# Patient Record
Sex: FEMALE | Race: WHITE | NOT HISPANIC OR LATINO | Employment: FULL TIME | URBAN - METROPOLITAN AREA
[De-identification: names, ages, dates, MRNs, and addresses within clinical notes are randomized per-mention and may not be internally consistent; named-entity substitution may affect disease eponyms.]

---

## 2017-01-08 DIAGNOSIS — F41.9 ANXIETY: ICD-10-CM

## 2017-01-08 RX ORDER — ALPRAZOLAM 1 MG/1
TABLET ORAL
Qty: 30 TABLET | Refills: 3 | Status: SHIPPED | OUTPATIENT
Start: 2017-01-08 | End: 2020-09-16

## 2017-01-31 ENCOUNTER — PATIENT MESSAGE (OUTPATIENT)
Dept: GYNECOLOGIC ONCOLOGY | Facility: CLINIC | Age: 32
End: 2017-01-31

## 2017-02-01 ENCOUNTER — TELEPHONE (OUTPATIENT)
Dept: GYNECOLOGIC ONCOLOGY | Facility: CLINIC | Age: 32
End: 2017-02-01

## 2017-02-01 NOTE — TELEPHONE ENCOUNTER
Spoke with pt. She states she is experiencing constant pain in right outer thigh, numbness, warm to touch and sharp pain in knees. Pt states she took gabapentin and ibuprofen but nothing is working. Pt was advised I will speak with Dr. Toscano. She voiced understanding.    Per Dr. Toscano, pt is aware he do not think pain is related to cancer, but if she is in that much pain she needs to go to the ED further evaluation possibly a ultrasound of her legs. She voiced understanding

## 2018-08-22 ENCOUNTER — TELEPHONE (OUTPATIENT)
Dept: GASTROENTEROLOGY | Facility: CLINIC | Age: 33
End: 2018-08-22

## 2018-08-22 DIAGNOSIS — K74.69 OTHER CIRRHOSIS OF LIVER: Primary | ICD-10-CM

## 2018-08-22 NOTE — TELEPHONE ENCOUNTER
----- Message from Beatris Lozoya sent at 8/22/2018 10:21 AM CDT -----  Contact: Pt  Name of Who is Calling: Marguerite      What is the request in detail: patient is callig requesting to speak with a nurse in regards to finding out if she has to come in for a visit or not.      Can the clinic reply by [facility]: no      What Number to Call Back if not in [facility]: [phone]

## 2019-10-16 ENCOUNTER — OFFICE VISIT (OUTPATIENT)
Dept: FAMILY MEDICINE | Facility: CLINIC | Age: 34
End: 2019-10-16
Payer: COMMERCIAL

## 2019-10-16 VITALS
OXYGEN SATURATION: 96 % | TEMPERATURE: 98 F | HEIGHT: 67 IN | HEART RATE: 81 BPM | SYSTOLIC BLOOD PRESSURE: 120 MMHG | BODY MASS INDEX: 45.99 KG/M2 | WEIGHT: 293 LBS | DIASTOLIC BLOOD PRESSURE: 80 MMHG

## 2019-10-16 DIAGNOSIS — M54.41 CHRONIC RIGHT-SIDED LOW BACK PAIN WITH RIGHT-SIDED SCIATICA: Primary | ICD-10-CM

## 2019-10-16 DIAGNOSIS — G89.29 CHRONIC RIGHT-SIDED LOW BACK PAIN WITH RIGHT-SIDED SCIATICA: Primary | ICD-10-CM

## 2019-10-16 DIAGNOSIS — R20.2 PARESTHESIA OF RIGHT LEG: ICD-10-CM

## 2019-10-16 PROCEDURE — 99203 OFFICE O/P NEW LOW 30 MIN: CPT | Mod: 25,S$GLB,, | Performed by: NURSE PRACTITIONER

## 2019-10-16 PROCEDURE — 96372 THER/PROPH/DIAG INJ SC/IM: CPT | Mod: S$GLB,,, | Performed by: NURSE PRACTITIONER

## 2019-10-16 PROCEDURE — 3008F PR BODY MASS INDEX (BMI) DOCUMENTED: ICD-10-PCS | Mod: S$GLB,,, | Performed by: NURSE PRACTITIONER

## 2019-10-16 PROCEDURE — 96372 PR INJECTION,THERAP/PROPH/DIAG2ST, IM OR SUBCUT: ICD-10-PCS | Mod: S$GLB,,, | Performed by: NURSE PRACTITIONER

## 2019-10-16 PROCEDURE — 3008F BODY MASS INDEX DOCD: CPT | Mod: S$GLB,,, | Performed by: NURSE PRACTITIONER

## 2019-10-16 PROCEDURE — 99203 PR OFFICE/OUTPT VISIT, NEW, LEVL III, 30-44 MIN: ICD-10-PCS | Mod: 25,S$GLB,, | Performed by: NURSE PRACTITIONER

## 2019-10-16 RX ORDER — ZINC GLUCONATE 50 MG
TABLET ORAL
COMMUNITY
End: 2020-02-17

## 2019-10-16 RX ORDER — TRIAMCINOLONE ACETONIDE 1 MG/G
CREAM TOPICAL 2 TIMES DAILY
COMMUNITY
End: 2021-10-13

## 2019-10-16 RX ORDER — SEMAGLUTIDE 1.34 MG/ML
INJECTION, SOLUTION SUBCUTANEOUS
COMMUNITY
Start: 2019-10-03 | End: 2022-11-09

## 2019-10-16 RX ORDER — METHOCARBAMOL 750 MG/1
750 TABLET, FILM COATED ORAL 4 TIMES DAILY PRN
Qty: 28 TABLET | Refills: 0 | Status: SHIPPED | OUTPATIENT
Start: 2019-10-16 | End: 2019-10-23

## 2019-10-16 RX ORDER — KETOROLAC TROMETHAMINE 30 MG/ML
30 INJECTION, SOLUTION INTRAMUSCULAR; INTRAVENOUS ONCE
Status: COMPLETED | OUTPATIENT
Start: 2019-10-16 | End: 2019-10-16

## 2019-10-16 RX ADMIN — KETOROLAC TROMETHAMINE 30 MG: 30 INJECTION, SOLUTION INTRAMUSCULAR; INTRAVENOUS at 03:10

## 2019-10-16 NOTE — PATIENT INSTRUCTIONS
Back Pain (Acute or Chronic)    Back pain is one of the most common problems. The good news is that most people feel better in 1 to 2 weeks, and most of the rest in 1 to 2 months. Most people can remain active.  People experience and describe pain differently; not everyone is the same.  · The pain can be sharp, stabbing, shooting, aching, cramping or burning.  · Movement, standing, bending, lifting, sitting, or walking may worsen pain.  · It can be localized to one spot or area, or it can be more generalized.  · It can spread or radiate upwards, to the front, or go down your arms or legs (sciatica).  · It can cause muscle spasm.  Most of the time, mechanical problems with the muscles or spine cause the pain. Mechanical problems are usually caused by an injury to the muscles or ligaments. While illness can cause back pain, it is usually not caused by a serious illness. Mechanical problems include:   · Physical activity such as sports, exercise, work, or normal activity  · Overexertion, lifting, pushing, pulling incorrectly or too aggressively  · Sudden twisting, bending, or stretching from an accident, or accidental movement  · Poor posture  · Stretching or moving wrong, without noticing pain at the time  · Poor coordination, lack of regular exercise (check with your doctor about this)  · Spinal disc disease or arthritis  · Stress  Pain can also be related to pregnancy, or illness like appendicitis, bladder or kidney infections, pelvic infections, and many other things.  Acute back pain usually gets better in 1 to 2 weeks. Back pain related to disk disease, arthritis in the spinal joints or spinal stenosis (narrowing of the spinal canal) can become chronic and last for months or years.  Unless you had a physical injury (for example, a car accident or fall) X-rays are usually not needed for the initial evaluation of back pain. If pain continues and does not respond to medical treatment, X-rays and other tests may be  needed.  Home care  Try these home care recommendations:  · When in bed, try to find a position of comfort. A firm mattress is best. Try lying flat on your back with pillows under your knees. You can also try lying on your side with your knees bent up towards your chest and a pillow between your knees.  · At first, do not try to stretch out the sore spots. If there is a strain, it is not like the good soreness you get after exercising without an injury. In this case, stretching may make it worse.  · Avoid prolong sitting, long car rides, or travel. This puts more stress on the lower back than standing or walking.  · During the first 24 to 72 hours after an acute injury or flare up of chronic back pain, apply an ice pack to the painful area for 20 minutes and then remove it for 20 minutes. Do this over a period of 60 to 90 minutes or several times a day. This will reduce swelling and pain. Wrap the ice pack in a thin towel or plastic to protect your skin.  · You can start with ice, then switch to heat. Heat (hot shower, hot bath, or heating pad) reduces pain and works well for muscle spasms. Heat can be applied to the painful area for 20 minutes then remove it for 20 minutes. Do this over a period of 60 to 90 minutes or several times a day. Do not sleep on a heating pad. It can lead to skin burns or tissue damage.  · You can alternate ice and heat therapy. Talk with your doctor about the best treatment for your back pain.  · Therapeutic massage can help relax the back muscles without stretching them.  · Be aware of safe lifting methods and do not lift anything without stretching first.  Medicines  Talk to your doctor before using medicine, especially if you have other medical problems or are taking other medicines.  · You may use over-the-counter medicine as directed on the bottle to control pain, unless another pain medicine was prescribed. If you have chronic conditions like diabetes, liver or kidney disease,  "stomach ulcers, or gastrointestinal bleeding, or are taking blood thinners, talk to your doctor before taking any medicine.  · Be careful if you are given a prescription medicines, narcotics, or medicine for muscle spasms. They can cause drowsiness, affect your coordination, reflexes, and judgement. Do not drive or operate heavy machinery.  Follow-up care  Follow up with your healthcare provider, or as advised.   A radiologist will review any X-rays that were taken. Your provide will notify you of any new findings that may affect your care.  Call 911  Call emergency services if any of the following occur:  · Trouble breathing  · Confusion  · Very drowsy or trouble awakening  · Fainting or loss of consciousness  · Rapid or very slow heart rate  · Loss of bowel or bladder control  When to seek medical advice  Call your healthcare provider right away if any of these occur:   · Pain becomes worse or spreads to your legs  · Weakness or numbness in one or both legs  · Numbness in the groin or genital area  Date Last Reviewed: 7/1/2016  © 2252-0333 [employer]. [address] [place] [address]. All rights reserved. This information is not intended as a substitute for professional medical care. Always follow your healthcare professional's instructions.        Paraesthesias  Paraesthesia is a burning or prickling sensation that is sometimes felt in the hands, arms, legs or feet. It can also occur in other parts of the body. It can also feel like tingling or numbness, skin crawling, or itching. The feeling is not comfortable, but it is not painful. (The "pins and needles" feeling that happens when a foot or hand "falls asleep" is a temporary paraesthesia.)  Paraesthesias that last or come and go may be caused by medical issues that need to be treated. These include stroke, a bulging disk pressing on a nerve, a trapped nerve, vitamin deficiencies, or even certain medicines.  Tests are often done. These " tests may include blood tests, X-ray, CT (computerized tomography) scan, or a muscle test (electromyography). Depending on the cause, treatment may include physical therapy.  Home care  · Tell the healthcare provider about all medicines you take. This includes prescription and over-the-counter medicines, vitamins, and herbs. Ask if any of the medicines may be causing your problems. Do not make any changes to prescription medicines without talking to your healthcare provider first.  · You may be prescribed medicines to help relieve the tingling feeling or for pain. Take all medicines as directed.  · A numb hand or foot may be more prone to injury. To help protect it:  ¨ Always use oven mitts.  ¨ Test water with an unaffected hand or foot.  ¨ Use caution when trimming nails. File sharp areas.  ¨ Wear shoes that fit well to avoid pressure points, blisters, and ulcers.  ¨ Inspect your hands and feet carefully (including the soles of your feet and between your toes) at least once a week. If you see red areas, sores, or other problems, tell your healthcare provider.  Follow-up care  Follow up with your doctor or as advised by our staff. You may need further testing or evaluation.  When to seek medical advice  Call your healthcare provider right away if any of the following occur:  · Numbness or weakness of the face, one arm, or one leg  · Slurred speech, confusion, trouble speaking, walking, or seeing  · Severe headache, fainting spell, dizziness, or seizure  · Chest, arm, neck, or upper back pain  · Loss of bladder or bowel control  · Open wound with redness, swelling, or pus  Date Last Reviewed: 9/25/2015 © 2000-2017 [employer]. [address] [place] [address], [place] [address]. All rights reserved. This information is not intended as a substitute for professional medical care. Always follow your healthcare professional's instructions.

## 2019-10-16 NOTE — PROGRESS NOTES
"    SUBJECTIVE:      Patient ID: Marguerite Colon is a 34 y.o. female.    Chief Complaint: Establish Care and Leg Pain    New patient here to establish care- she is transferring from Dr. Huynh. She is under the care of Dr. Ruiz currently for DM Type 2 and thyroid issues. She has been c/o right lower back pain and right leg pain for approximately 6 years. She has recently been experiencing increased pain in her right leg and lower back with new onset numbess in her outer thigh. She has been evaluated previously for "circulation issues" with an US of her LE which was normal. She recently stopped her statin drug per the advise of her endocrine specialist as a possible cause- she has stopped the medication for 3 weeks and no improvement in her symptoms. She denies any recent trauma, injury or falls. She is taking Gabapentin  mg currently but denies any history of peripheral neuropathy. She thinks she was on this medication initially for the pain which she thought was being caused by the chemo she had years ago for ovarian cancer. She does not feel the Gabapentin is helping at all. She is unable to take medication with Tylenol d/t a hx of cirrhosis of her liver caused by fatty liver. She has not tried any NSAID medications.     Leg Pain    Incident onset: over the past 6 years- worsening in the last week  There was no injury mechanism. The pain is present in the left leg and left thigh. The quality of the pain is described as stabbing (sharp ). Associated symptoms include numbness and tingling. Pertinent negatives include no inability to bear weight, loss of motion, loss of sensation or muscle weakness. She reports no foreign bodies present. The symptoms are aggravated by movement and weight bearing. She has tried rest and non-weight bearing for the symptoms. The treatment provided mild relief.   Back Pain   This is a new (right-sided ) problem. The current episode started in the past 7 days. The problem occurs " constantly. The problem has been rapidly worsening since onset. The pain is present in the gluteal and lumbar spine. The quality of the pain is described as aching, burning, shooting and stabbing. The pain radiates to the right knee and right thigh. The pain is at a severity of 9/10. The pain is severe. The pain is the same all the time. The symptoms are aggravated by bending, sitting and standing. Stiffness is present all day. Associated symptoms include leg pain, numbness, paresthesias, tingling and weakness. Pertinent negatives include no abdominal pain, bladder incontinence, bowel incontinence, chest pain, dysuria, fever, headaches, paresis, pelvic pain, perianal numbness or weight loss. Risk factors include menopause, obesity and history of cancer. She has tried bed rest for the symptoms. The treatment provided no relief.       Family History   Problem Relation Age of Onset    Diabetes Mother     Heart disease Mother     Stroke Mother     Hypertension Mother     Diabetes Father     Ovarian cancer Neg Hx     Uterine cancer Neg Hx     Breast cancer Neg Hx     Colon cancer Neg Hx       Social History     Socioeconomic History    Marital status:      Spouse name: Not on file    Number of children: Not on file    Years of education: Not on file    Highest education level: Not on file   Occupational History    Not on file   Social Needs    Financial resource strain: Not on file    Food insecurity:     Worry: Not on file     Inability: Not on file    Transportation needs:     Medical: Not on file     Non-medical: Not on file   Tobacco Use    Smoking status: Former Smoker     Packs/day: 0.25     Years: 10.00     Pack years: 2.50     Types: Cigarettes     Last attempt to quit: 2015     Years since quittin.7    Smokeless tobacco: Never Used   Substance and Sexual Activity    Alcohol use: No    Drug use: No    Sexual activity: Not Currently     Partners: Male   Lifestyle    Physical  "activity:     Days per week: Not on file     Minutes per session: Not on file    Stress: Not at all   Relationships    Social connections:     Talks on phone: Not on file     Gets together: Not on file     Attends Sikh service: Not on file     Active member of club or organization: Not on file     Attends meetings of clubs or organizations: Not on file     Relationship status: Not on file   Other Topics Concern    Not on file   Social History Narrative    Not on file     Current Outpatient Medications   Medication Sig Dispense Refill    alprazolam (XANAX) 1 MG tablet take 1 tablet by mouth at bedtime if needed for insomnia 30 tablet 3    gabapentin (NEURONTIN) 300 MG capsule Take 300 mg by mouth 2 (two) times daily.  0    HUMULIN R U-500 "CONCENTRATED" 500 unit/mL Soln Inject into the skin 3 (three) times daily.   0    insulin needles, disposable, 30 x 1/3 " Ndle Uses 4 daily, on multiple daily insulin injections 150 each 12    magnesium oxide 500 mg Cap Take by mouth.      metformin (GLUCOPHAGE) 500 MG tablet Take 500 mg by mouth 2 (two) times daily.       OZEMPIC 1 mg/dose (2 mg/1.5 mL) PnIj       sertraline (ZOLOFT) 50 MG tablet Take 50 mg by mouth once daily.      triamcinolone acetonide 0.1% (KENALOG) 0.1 % cream Apply topically 2 (two) times daily.      methocarbamol (ROBAXIN) 750 MG Tab Take 1 tablet (750 mg total) by mouth 4 (four) times daily as needed. 28 tablet 0     No current facility-administered medications for this visit.      Review of patient's allergies indicates:   Allergen Reactions    Carboplatin      Had to go to ER.     Tuberculin ppd Rash     localized      Past Medical History:   Diagnosis Date    Anxiety     Bilateral headaches 4/1/2016    Cirrhosis     Diabetes mellitus     type 2    Hepatomegaly Aug 2014    Morbidly obese     Obesity, Class III, BMI 40-49.9 (morbid obesity) 4/1/2016    Ovarian cancer 12/2012    grade 1 endometrioid adenoca of the ovary    " "Pancreatitis     Small bowel obstruction due to adhesions Dec 2015     Thyroid disease     hypothyroid    Thyroid nodule      Past Surgical History:   Procedure Laterality Date    ABDOMINAL ADHESION SURGERY      done at same time with ovarian cystectomy    CHOLECYSTECTOMY  dec 2015    HERNIA REPAIR  12/28/2015    25 x 20 cm Venrtralex ST mesh for repair done at time of [facility] for SBO.     HYSTERECTOMY  2 /25/2013    adeline rso. prior lso. ovarian ca    leep      positive margins    left salpingo oophrectomy  7/10    hemorrhagic cyst    OMENTECTOMY      partial right oophrectomy  12/12    LMP of ovary    [place] REMOVAL OF OVARY/TUBE(S)      x lap  12/28/2015    post open cholecystectomy 2/21/2015.        Review of Systems   Constitutional: Negative for appetite change, chills, diaphoresis, fever, unexpected weight change and weight loss.   HENT: Negative for congestion and sore throat.    Respiratory: Negative for shortness of breath.    Cardiovascular: Negative for chest pain and leg swelling.   Gastrointestinal: Negative for abdominal pain, blood in stool, bowel incontinence, constipation, diarrhea, nausea and vomiting.   Endocrine: Negative for polydipsia and polyuria.   Genitourinary: Negative for bladder incontinence, dysuria, flank pain, frequency, hematuria and pelvic pain.   Musculoskeletal: Positive for back pain and myalgias. Negative for arthralgias, gait problem and neck pain.   Skin: Negative for color change, rash and wound.   Neurological: Positive for tingling, weakness, numbness and paresthesias. Negative for dizziness, syncope and headaches.      OBJECTIVE:      Vitals:    10/16/19 1451   BP: 120/80   Pulse: 81   Temp: 98.3 °F (36.8 °C)   TempSrc: Oral   SpO2: 96%   Weight: (!) 146.3 kg (322 lb 9.6 oz)   Height: 5' 7" (1.702 m)     Physical Exam   Constitutional: She is oriented to person, place, and time. She appears well-developed and well-nourished. No distress.   Morbidly Obese    HENT: "   Head: Normocephalic.   Mouth/Throat: Oropharynx is clear and moist.   Eyes: Pupils are equal, round, and reactive to light.   Neck: Neck supple.   Cardiovascular: Normal rate, regular rhythm, normal heart sounds and intact distal pulses.   Pulmonary/Chest: Effort normal and breath sounds normal. She has no wheezes. She has no rales.   Abdominal: Soft. Bowel sounds are normal.   Musculoskeletal: She exhibits no edema.        Lumbar back: She exhibits normal range of motion, no tenderness, no swelling, no edema, no deformity, no laceration, no pain and no spasm.   Lymphadenopathy:     She has no cervical adenopathy.   Neurological: She is alert and oriented to person, place, and time. She displays normal reflexes. No cranial nerve deficit or sensory deficit. She exhibits normal muscle tone. Coordination normal.   Neg SLR bilaterally    Skin: Skin is warm and dry. No rash noted. No erythema.   Psychiatric: She has a normal mood and affect. Her behavior is normal.   Nursing note and vitals reviewed.     Assessment:       1. Chronic right-sided low back pain with right-sided sciatica    2. Paresthesia of right leg        Plan:       Chronic right-sided low back pain with right-sided sciatica  -     X-Ray Lumbar Spine Ap Lateral w/Flex Ext; Future; Expected date: 10/16/2019  -     Ambulatory Referral to Physical Medicine Rehab  -     ketorolac injection 30 mg  -     methocarbamol (ROBAXIN) 750 MG Tab; Take 1 tablet (750 mg total) by mouth 4 (four) times daily as needed.  Dispense: 28 tablet; Refill: 0    *advised NSAIDS prn with food otc (none today d/t injection); stretching, ice/heat, see PMR for further eval/EMG as discussed     Paresthesia of right leg  -     Ambulatory Referral to Physical Medicine Rehab      *will get labs from endocrine (recently done for DM)     Follow up in about 3 months (around 1/16/2020) for DM .      10/16/2019 ELIZABETH Santos, [place]

## 2019-10-17 ENCOUNTER — PATIENT MESSAGE (OUTPATIENT)
Dept: FAMILY MEDICINE | Facility: CLINIC | Age: 34
End: 2019-10-17

## 2019-10-17 ENCOUNTER — TELEPHONE (OUTPATIENT)
Dept: ORTHOPEDICS | Facility: CLINIC | Age: 34
End: 2019-10-17

## 2019-10-21 ENCOUNTER — INITIAL CONSULT (OUTPATIENT)
Dept: PHYSICAL MEDICINE AND REHAB | Facility: CLINIC | Age: 34
End: 2019-10-21
Payer: COMMERCIAL

## 2019-10-21 VITALS
HEART RATE: 80 BPM | BODY MASS INDEX: 45.99 KG/M2 | HEIGHT: 67 IN | RESPIRATION RATE: 13 BRPM | DIASTOLIC BLOOD PRESSURE: 89 MMHG | SYSTOLIC BLOOD PRESSURE: 139 MMHG | WEIGHT: 293 LBS

## 2019-10-21 DIAGNOSIS — M54.41 ACUTE RIGHT-SIDED LOW BACK PAIN WITH RIGHT-SIDED SCIATICA: Primary | ICD-10-CM

## 2019-10-21 DIAGNOSIS — R20.2 PARESTHESIA OF SKIN: ICD-10-CM

## 2019-10-21 DIAGNOSIS — M25.50 ARTHRALGIA, UNSPECIFIED JOINT: ICD-10-CM

## 2019-10-21 DIAGNOSIS — M79.10 MYALGIA: ICD-10-CM

## 2019-10-21 DIAGNOSIS — E66.01 CLASS 3 SEVERE OBESITY WITH BODY MASS INDEX (BMI) OF 50.0 TO 59.9 IN ADULT, UNSPECIFIED OBESITY TYPE, UNSPECIFIED WHETHER SERIOUS COMORBIDITY PRESENT: ICD-10-CM

## 2019-10-21 PROCEDURE — 3008F BODY MASS INDEX DOCD: CPT | Mod: CPTII,S$GLB,, | Performed by: PHYSICAL MEDICINE & REHABILITATION

## 2019-10-21 PROCEDURE — 99999 PR PBB SHADOW E&M-EST. PATIENT-LVL V: ICD-10-PCS | Mod: PBBFAC,,, | Performed by: PHYSICAL MEDICINE & REHABILITATION

## 2019-10-21 PROCEDURE — 3008F PR BODY MASS INDEX (BMI) DOCUMENTED: ICD-10-PCS | Mod: CPTII,S$GLB,, | Performed by: PHYSICAL MEDICINE & REHABILITATION

## 2019-10-21 PROCEDURE — 99999 PR PBB SHADOW E&M-EST. PATIENT-LVL V: CPT | Mod: PBBFAC,,, | Performed by: PHYSICAL MEDICINE & REHABILITATION

## 2019-10-21 PROCEDURE — 99203 PR OFFICE/OUTPT VISIT, NEW, LEVL III, 30-44 MIN: ICD-10-PCS | Mod: S$GLB,,, | Performed by: PHYSICAL MEDICINE & REHABILITATION

## 2019-10-21 PROCEDURE — 99203 OFFICE O/P NEW LOW 30 MIN: CPT | Mod: S$GLB,,, | Performed by: PHYSICAL MEDICINE & REHABILITATION

## 2019-10-21 NOTE — LETTER
October 21, 2019      Eleni Gooden, FNP  [address] [facility] [address]  [address]  [place] [address]           [place] [facility]  [address] [place] [address] [address]  [place] [address]  Phone: [phone]  Fax: [phone]          Patient: Marguerite Colon   MR Number: [id number]   YOB: 1985   Date of Visit: 10/21/2019       Dear Eleni Gooden:    Thank you for referring Marguerite Colon to me for evaluation. Attached you will find relevant portions of my assessment and plan of care.    If you have questions, please do not hesitate to call me. I look forward to following Marguerite Colon along with you.    Sincerely,    Peter Kauffman MD    Enclosure  CC:  No Recipients    If you would like to receive this communication electronically, please contact [email or web address] or [phone] to request more information on [employer] access.    For providers and/or their staff who would like to refer a patient to [facility], please contact us through our one-stop-shop provider referral line, [facility], at [phone].    If you feel you have received this communication in error or would no longer like to receive these types of communications, please e-mail [email or web address]

## 2019-10-21 NOTE — PROGRESS NOTES
Today's Date: 10/21/2019     Referring Provider: Eleni Gooden     Chief Complaint:   Chief Complaint   Patient presents with    Back Pain     low back pain    Leg Pain     bilateral leg pain, but right hurts more        HPI: Marguerite Colon is a 34 y.o. female with past medical history of ovarian cancer status post chemotherapy who presents today for evaluation and treatment of low back pain and right lower extremity radicular symptoms.  She states the pain started approximately 2 weeks ago without inciting event.  She states that she woke up with the pain.  The pain starts around the lumbosacral junction radiating down the right lateral hip to the right lateral thigh stopping at about the knee.  She describes her back pain as a throbbing/spasming pain in the low back and the leg pain is a numbness/shooting pain into the knee.  Pain is worse with prolonged standing and at the end of the day after work.  She denies any lower extremity weakness, bowel or bladder dysfunction, or saddle anesthesia.  She states her low back pain is on average a 7/10.    She also states that neck pain and right arm pain started around the same time.  Pain radiates from the right lower neck and into the elbow.  She complains of numbness and tingling in the bilateral hands worse with prolonged use and as well as sleeping at night.  She denies any overt weakness.  She denies any weakness of the upper extremities.  She denies any muscle spasticity.    She does have a history of diabetes.  Her sugars generally run at the highest in the 180s.    She has numbness and tingling in the bilateral feet which has been present ever since Platinum based chemotherapy for cancer.    Review of Systems   Constitutional: Negative for fever and malaise/fatigue.   HENT: Negative for congestion and sore throat.    Eyes: Negative for blurred vision and double vision.   Respiratory: Negative for cough and shortness of breath.    Cardiovascular: Negative for  chest pain and palpitations.   Gastrointestinal: Negative for blood in stool, constipation and diarrhea.        No loss of bowel     Genitourinary: Negative for dysuria, frequency, hematuria and urgency.        No loss of bladder     Musculoskeletal: Positive for back pain and neck pain. Negative for falls, joint pain and myalgias.   Neurological: Positive for tingling and sensory change. Negative for focal weakness and weakness.   Endo/Heme/Allergies: Negative for environmental allergies. Does not bruise/bleed easily.   Psychiatric/Behavioral: Negative for depression. The patient is not nervous/anxious and does not have insomnia.         Social History     Socioeconomic History    Marital status:      Spouse name: Not on file    Number of children: Not on file    Years of education: Not on file    Highest education level: Not on file   Occupational History    Not on file   Social Needs    Financial resource strain: Not on file    Food insecurity:     Worry: Not on file     Inability: Not on file    Transportation needs:     Medical: Not on file     Non-medical: Not on file   Tobacco Use    Smoking status: Former Smoker     Packs/day: 0.25     Years: 10.00     Pack years: 2.50     Types: Cigarettes     Last attempt to quit: 2015     Years since quittin.7    Smokeless tobacco: Never Used   Substance and Sexual Activity    Alcohol use: No    Drug use: No    Sexual activity: Not Currently     Partners: Male   Lifestyle    Physical activity:     Days per week: Not on file     Minutes per session: Not on file    Stress: Not at all   Relationships    Social connections:     Talks on phone: Not on file     Gets together: Not on file     Attends Rastafarian service: Not on file     Active member of club or organization: Not on file     Attends meetings of clubs or organizations: Not on file     Relationship status: Not on file   Other Topics Concern    Not on file   Social History Narrative     Not on file       Family History   Problem Relation Age of Onset    Diabetes Mother     Heart disease Mother     Stroke Mother     Hypertension Mother     Diabetes Father     Ovarian cancer Neg Hx     Uterine cancer Neg Hx     Breast cancer Neg Hx     Colon cancer Neg Hx        Review of patient's allergies indicates:   Allergen Reactions    Carboplatin      Had to go to ER.     Tuberculin ppd Rash     localized       Past Surgical History:   Procedure Laterality Date    ABDOMINAL ADHESION SURGERY      done at same time with ovarian cystectomy    CHOLECYSTECTOMY  dec 2015    HERNIA REPAIR  12/28/2015    25 x 20 cm Venrtralex ST mesh for repair done at time of [facility] for SBO.     HYSTERECTOMY  2 /25/2013    adeline rso. prior lso. ovarian ca    leep      positive margins    left salpingo oophrectomy  7/10    hemorrhagic cyst    OMENTECTOMY      partial right oophrectomy  12/12    LMP of ovary    [place] REMOVAL OF OVARY/TUBE(S)      x lap  12/28/2015    post open cholecystectomy 2/21/2015.        Past Medical History:   Diagnosis Date    Anxiety     Bilateral headaches 4/1/2016    Cirrhosis     Diabetes mellitus     type 2    Hepatomegaly Aug 2014    Morbidly obese     Obesity, Class III, BMI 40-49.9 (morbid obesity) 4/1/2016    Ovarian cancer 12/2012    grade 1 endometrioid adenoca of the ovary    Pancreatitis     Small bowel obstruction due to adhesions Dec 2015     Thyroid disease     hypothyroid    Thyroid nodule        Physical Exam   Constitutional: She is oriented to person, place, and time. She appears well-developed and well-nourished. No distress.   HENT:   Head: Normocephalic and atraumatic.   Eyes: Pupils are equal, round, and reactive to light. EOM are normal.   Pulmonary/Chest: Effort normal. No respiratory distress.   Abdominal: Normal appearance. She exhibits no distension.   Musculoskeletal:        Lumbar back: She exhibits decreased range of motion (Flexion of the lumbar spine  preserved.  Decreased with extension.), tenderness (Lumbosacral paraspinals), bony tenderness (Lower lumbosacral facets) and pain. She exhibits no swelling, no edema, no deformity, no laceration, no spasm and normal pulse.        Back:    Neurological: She is alert and oriented to person, place, and time. She has normal strength. A sensory deficit (Altered sensation in the right proximal L5 dermatomal distribution) is present. No cranial nerve deficit. Coordination normal. She displays no Babinski's sign on the right side. She displays no Babinski's sign on the left side.   Reflex Scores:       Tricep reflexes are 2+ on the right side and 2+ on the left side.       Bicep reflexes are 2+ on the right side and 2+ on the left side.       Brachioradialis reflexes are 2+ on the right side and 2+ on the left side.       Patellar reflexes are 2+ on the right side and 2+ on the left side.       Achilles reflexes are 2+ on the right side and 2+ on the left side.  Negative clonus bilaterally.  Negative Leticia's bilaterally   Skin: Skin is warm, dry and intact.   Psychiatric: She has a normal mood and affect. Her speech is normal and behavior is normal. Thought content normal.      Ortho Exam     Marguerite AYALA was seen today for back pain and leg pain.    Diagnoses and all orders for this visit:    Myalgia    Acute right-sided low back pain with right-sided sciatica    Paresthesia of skin    Arthralgia, unspecified joint    Class 3 severe obesity with body mass index (BMI) of 50.0 to 59.9 in adult, unspecified obesity type, unspecified whether serious comorbidity present          PLAN:   Marguerite Colon is a 34-year-old female with a 2 week history of low back pain with right-sided lower extremity radicular symptoms and what sounds like an L5 dermatomal distribution.  She has no red flag symptoms.  As her symptoms are relatively acute, the tenants of the treatment of acute low back pain were discussed with Mrs. Colon today.  She was  advised to take ibuprofen 800 mg q.8h p.r.n. for severe pain to take with food and water.  She was advised to avoid prolonged periods of rest and immobility. She will be referred for physical therapy for core strengthening, extension based Jose exercises as her pain is likely secondary to a disc herniation, muscle stretching, range of motion exercises and modalities as indicated.  She is scheduled for an x-ray of the lumbar spine.  She will return to clinic in 3-4 weeks to discuss the results as well as progress with physical therapy.  If her symptoms continue, she will likely require an EMG/NCS of the upper extremities to assess for carpal tunnel syndrome versus peripheral neuropathy versus cervical radiculopathy of the right upper extremity and of the bilateral lower extremities to assess for peripheral neuropathy and lumbar radiculopathy in the right lower extremity.  EMG at this time would likely be nondiagnostic for radiculopathy in the lower extremity as Wallerian degeneration can take weeks to be identified on electrodiagnostic studies.  She will be reassessed in 3-4 weeks.    Peter Kauffman D.O.  Board-Certified by the American Board of Physical Medicine and Rehabilitation  Board-Certified by the American Board of Electrodiagnostic Medicine         CC: Patients PCP: ABDIRASHID Santos  CC: Referring Provider: Eleni Gooden

## 2019-10-22 ENCOUNTER — HOSPITAL ENCOUNTER (OUTPATIENT)
Dept: RADIOLOGY | Facility: HOSPITAL | Age: 34
Discharge: HOME | End: 2019-10-22
Attending: NURSE PRACTITIONER
Payer: COMMERCIAL

## 2019-10-22 DIAGNOSIS — G89.29 CHRONIC RIGHT-SIDED LOW BACK PAIN WITH RIGHT-SIDED SCIATICA: ICD-10-CM

## 2019-10-22 DIAGNOSIS — M54.41 CHRONIC RIGHT-SIDED LOW BACK PAIN WITH RIGHT-SIDED SCIATICA: ICD-10-CM

## 2019-10-22 PROCEDURE — 72120 X-RAY BEND ONLY L-S SPINE: CPT | Mod: TC,PO

## 2019-10-25 ENCOUNTER — PATIENT MESSAGE (OUTPATIENT)
Dept: PHYSICAL MEDICINE AND REHAB | Facility: CLINIC | Age: 34
End: 2019-10-25

## 2019-10-25 RX ORDER — CYCLOBENZAPRINE HCL 10 MG
10 TABLET ORAL 3 TIMES DAILY PRN
Qty: 45 TABLET | Refills: 0 | Status: SHIPPED | OUTPATIENT
Start: 2019-10-25 | End: 2020-02-17

## 2019-10-25 RX ORDER — METHOCARBAMOL 750 MG/1
750 TABLET, FILM COATED ORAL 4 TIMES DAILY
Qty: 84 TABLET | Refills: 0 | Status: SHIPPED | OUTPATIENT
Start: 2019-10-25 | End: 2019-10-25 | Stop reason: ALTCHOICE

## 2019-10-28 ENCOUNTER — CLINICAL SUPPORT (OUTPATIENT)
Dept: REHABILITATION | Facility: HOSPITAL | Age: 34
End: 2019-10-28
Attending: PHYSICAL MEDICINE & REHABILITATION
Payer: COMMERCIAL

## 2019-10-28 DIAGNOSIS — R29.3 POSTURE ABNORMALITY: ICD-10-CM

## 2019-10-28 DIAGNOSIS — R26.9 GAIT ABNORMALITY: ICD-10-CM

## 2019-10-28 PROCEDURE — 97161 PT EVAL LOW COMPLEX 20 MIN: CPT | Mod: PN

## 2019-10-31 PROBLEM — R29.3 POSTURE ABNORMALITY: Status: ACTIVE | Noted: 2019-10-31

## 2019-10-31 PROBLEM — R26.9 GAIT ABNORMALITY: Status: ACTIVE | Noted: 2019-10-31

## 2019-11-11 ENCOUNTER — OFFICE VISIT (OUTPATIENT)
Dept: FAMILY MEDICINE | Facility: CLINIC | Age: 34
End: 2019-11-11
Payer: COMMERCIAL

## 2019-11-11 VITALS
WEIGHT: 293 LBS | BODY MASS INDEX: 45.99 KG/M2 | TEMPERATURE: 98 F | OXYGEN SATURATION: 97 % | HEART RATE: 84 BPM | HEIGHT: 67 IN | SYSTOLIC BLOOD PRESSURE: 124 MMHG | DIASTOLIC BLOOD PRESSURE: 68 MMHG

## 2019-11-11 DIAGNOSIS — H66.001 NON-RECURRENT ACUTE SUPPURATIVE OTITIS MEDIA OF RIGHT EAR WITHOUT SPONTANEOUS RUPTURE OF TYMPANIC MEMBRANE: Primary | ICD-10-CM

## 2019-11-11 DIAGNOSIS — J30.1 SEASONAL ALLERGIC RHINITIS DUE TO POLLEN: ICD-10-CM

## 2019-11-11 PROBLEM — J45.909 REACTIVE AIRWAY DISEASE: Status: ACTIVE | Noted: 2019-11-11

## 2019-11-11 PROCEDURE — 3008F BODY MASS INDEX DOCD: CPT | Mod: S$GLB,,, | Performed by: NURSE PRACTITIONER

## 2019-11-11 PROCEDURE — 99213 PR OFFICE/OUTPT VISIT, EST, LEVL III, 20-29 MIN: ICD-10-PCS | Mod: S$GLB,,, | Performed by: NURSE PRACTITIONER

## 2019-11-11 PROCEDURE — 99213 OFFICE O/P EST LOW 20 MIN: CPT | Mod: S$GLB,,, | Performed by: NURSE PRACTITIONER

## 2019-11-11 PROCEDURE — 3008F PR BODY MASS INDEX (BMI) DOCUMENTED: ICD-10-PCS | Mod: S$GLB,,, | Performed by: NURSE PRACTITIONER

## 2019-11-11 RX ORDER — FLUTICASONE PROPIONATE 50 MCG
1 SPRAY, SUSPENSION (ML) NASAL 2 TIMES DAILY
Qty: 1 BOTTLE | Refills: 3 | Status: SHIPPED | OUTPATIENT
Start: 2019-11-11 | End: 2020-09-16

## 2019-11-11 RX ORDER — AMOXICILLIN AND CLAVULANATE POTASSIUM 875; 125 MG/1; MG/1
1 TABLET, FILM COATED ORAL 2 TIMES DAILY
Qty: 20 TABLET | Refills: 0 | Status: SHIPPED | OUTPATIENT
Start: 2019-11-11 | End: 2020-02-17

## 2019-11-11 RX ORDER — AZELASTINE 1 MG/ML
1 SPRAY, METERED NASAL 2 TIMES DAILY
Qty: 30 ML | Refills: 3 | Status: SHIPPED | OUTPATIENT
Start: 2019-11-11 | End: 2020-09-16

## 2019-11-11 NOTE — PATIENT INSTRUCTIONS
Middle Ear Infection (Adult)  You have an infection of the middle ear, the space behind the eardrum. This is also called acute otitis media (AOM). Sometimes it is caused by the common cold. This is because congestion can block the internal passage (eustachian tube) that drains fluid from the middle ear. When the middle ear fills with fluid, bacteria can grow there and cause an infection. Oral antibiotics are used to treat this illness, not ear drops. Symptoms usually start to improve within 1 to 2 days of treatment.    Home care  The following are general care guidelines:  · Finish all of the antibiotic medicine given, even though you may feel better after the first few days.  · You may use over-the-counter medicine, such as acetaminophen or ibuprofen, to control pain and fever, unless something else was prescribed. If you have chronic liver or kidney disease or have ever had a stomach ulcer or gastrointestinal bleeding, talk with your healthcare provider before using these medicines. Do not give aspirin to anyone under 18 years of age who has a fever. It may cause severe illness or death.  Follow-up care  Follow up with your healthcare provider, or as advised, in 2 weeks if all symptoms have not gotten better, or if hearing doesn't go back to normal within 1 month.  When to seek medical advice  Call your healthcare provider right away if any of these occur:  · Ear pain gets worse or does not improve after 3 days of treatment  · Unusual drowsiness or confusion  · Neck pain, stiff neck, or headache  · Fluid or blood draining from the ear canal  · Fever of 100.4°F (38°C) or as advised   · Seizure  Date Last Reviewed: 6/1/2016  © 1346-4956 [employer]. [address] [place] [address], [place] [address]. All rights reserved. This information is not intended as a substitute for professional medical care. Always follow your healthcare professional's instructions.

## 2019-11-11 NOTE — PROGRESS NOTES
SUBJECTIVE:      Patient ID: Marguerite Colon is a 34 y.o. female.    Chief Complaint: URI (x 5 days)    Marguerite is here today with c/o cough and congestion since last Wednesday.    URI    This is a new problem. The current episode started in the past 7 days. The problem has been unchanged. Associated symptoms include congestion, coughing, ear pain, a plugged ear sensation, rhinorrhea and sinus pain. Pertinent negatives include no abdominal pain, chest pain, diarrhea, dysuria, headaches, nausea, rash, sore throat, swollen glands, vomiting or wheezing. She has tried antihistamine, decongestant and acetaminophen for the symptoms. The treatment provided no relief.       Past Surgical History:   Procedure Laterality Date    ABDOMINAL ADHESION SURGERY      done at same time with ovarian cystectomy    CHOLECYSTECTOMY  dec 2015    HERNIA REPAIR  12/28/2015    25 x 20 cm Venrtralex ST mesh for repair done at time of [facility] for SBO.     HYSTERECTOMY  2 /25/2013    adeline rso. prior lso. ovarian ca    leep      positive margins    left salpingo oophrectomy  7/10    hemorrhagic cyst    OMENTECTOMY      partial right oophrectomy  12/12    LMP of ovary    [place] REMOVAL OF OVARY/TUBE(S)      x lap  12/28/2015    post open cholecystectomy 2/21/2015.      Family History   Problem Relation Age of Onset    Diabetes Mother     Heart disease Mother     Stroke Mother     Hypertension Mother     Diabetes Father     Ovarian cancer Neg Hx     Uterine cancer Neg Hx     Breast cancer Neg Hx     Colon cancer Neg Hx       Social History     Socioeconomic History    Marital status:      Spouse name: Not on file    Number of children: Not on file    Years of education: Not on file    Highest education level: Not on file   Occupational History    Not on file   Social Needs    Financial resource strain: Not on file    Food insecurity:     Worry: Not on file     Inability: Not on file    Transportation needs:     Medical: Not  "on file     Non-medical: Not on file   Tobacco Use    Smoking status: Former Smoker     Packs/day: 0.25     Years: 10.00     Pack years: 2.50     Types: Cigarettes     Last attempt to quit: 2015     Years since quittin.7    Smokeless tobacco: Never Used   Substance and Sexual Activity    Alcohol use: No    Drug use: No    Sexual activity: Not Currently     Partners: Male   Lifestyle    Physical activity:     Days per week: Not on file     Minutes per session: Not on file    Stress: Not at all   Relationships    Social connections:     Talks on phone: Not on file     Gets together: Not on file     Attends Orthodox service: Not on file     Active member of club or organization: Not on file     Attends meetings of clubs or organizations: Not on file     Relationship status: Not on file   Other Topics Concern    Not on file   Social History Narrative    Not on file     Current Outpatient Medications   Medication Sig Dispense Refill    alprazolam (XANAX) 1 MG tablet take 1 tablet by mouth at bedtime if needed for insomnia 30 tablet 3    cyclobenzaprine (FLEXERIL) 10 MG tablet Take 1 tablet (10 mg total) by mouth 3 (three) times daily as needed for Muscle spasms (pain). 45 tablet 0    gabapentin (NEURONTIN) 300 MG capsule Take 300 mg by mouth 2 (two) times daily.  0    HUMULIN R U-500 "CONCENTRATED" 500 unit/mL Soln Inject into the skin 3 (three) times daily.   0    insulin needles, disposable, 30 x 1/3 " Ndle Uses 4 daily, on multiple daily insulin injections 150 each 12    magnesium oxide 500 mg Cap Take by mouth.      metformin (GLUCOPHAGE) 500 MG tablet Take 500 mg by mouth 2 (two) times daily.       OZEMPIC 1 mg/dose (2 mg/1.5 mL) PnIj       sertraline (ZOLOFT) 50 MG tablet Take 50 mg by mouth once daily.      triamcinolone acetonide 0.1% (KENALOG) 0.1 % cream Apply topically 2 (two) times daily.      amoxicillin-clavulanate 875-125mg (AUGMENTIN) 875-125 mg per tablet Take 1 tablet by " mouth 2 (two) times daily. 20 tablet 0    azelastine (ASTELIN) 137 mcg (0.1 %) nasal spray 1 spray (137 mcg total) by Nasal route 2 (two) times daily. 30 mL 3    fluticasone propionate (FLONASE) 50 mcg/actuation nasal spray 1 spray (50 mcg total) by Each Nostril route 2 (two) times daily. 1 Bottle 3     No current facility-administered medications for this visit.      Review of patient's allergies indicates:   Allergen Reactions    Carboplatin      Had to go to ER.     Tuberculin ppd Rash     localized      Past Medical History:   Diagnosis Date    Anxiety     Bilateral headaches 4/1/2016    Cirrhosis     Diabetes mellitus     type 2    Hepatomegaly Aug 2014    Morbidly obese     Obesity, Class III, BMI 40-49.9 (morbid obesity) 4/1/2016    Ovarian cancer 12/2012    grade 1 endometrioid adenoca of the ovary    Pancreatitis     Small bowel obstruction due to adhesions Dec 2015     Thyroid disease     hypothyroid    Thyroid nodule      Past Surgical History:   Procedure Laterality Date    ABDOMINAL ADHESION SURGERY      done at same time with ovarian cystectomy    CHOLECYSTECTOMY  dec 2015    HERNIA REPAIR  12/28/2015    25 x 20 cm Venrtralex ST mesh for repair done at time of [facility] for SBO.     HYSTERECTOMY  2 /25/2013    adeline rso. prior lso. ovarian ca    leep      positive margins    left salpingo oophrectomy  7/10    hemorrhagic cyst    OMENTECTOMY      partial right oophrectomy  12/12    LMP of ovary    [place] REMOVAL OF OVARY/TUBE(S)      x lap  12/28/2015    post open cholecystectomy 2/21/2015.        Review of Systems   Constitutional: Positive for fever (last week). Negative for chills and fatigue.   HENT: Positive for congestion, ear pain, postnasal drip, rhinorrhea, sinus pressure and sinus pain. Negative for nosebleeds, sore throat and voice change.    Eyes: Negative for photophobia, redness, itching and visual disturbance.   Respiratory: Positive for cough. Negative for choking,  "shortness of breath and wheezing.    Cardiovascular: Negative for chest pain, palpitations and leg swelling.   Gastrointestinal: Negative for abdominal distention, abdominal pain, diarrhea, nausea and vomiting.   Genitourinary: Negative for dysuria.   Skin: Negative for rash.   Neurological: Negative for headaches.      OBJECTIVE:      Vitals:    11/11/19 1605   BP: 124/68   Pulse: 84   Temp: 98.3 °F (36.8 °C)   SpO2: 97%   Weight: (!) 143.3 kg (316 lb)   Height: 5' 7" (1.702 m)     Physical Exam   Constitutional: She is oriented to person, place, and time. She appears well-developed and well-nourished. No distress.   HENT:   Head: Normocephalic and atraumatic.   Right Ear: External ear and ear canal normal. Tympanic membrane is erythematous and retracted. A middle ear effusion is present.   Left Ear: Tympanic membrane, external ear and ear canal normal.   Nose: Mucosal edema and rhinorrhea present. Right sinus exhibits maxillary sinus tenderness. Right sinus exhibits no frontal sinus tenderness. Left sinus exhibits maxillary sinus tenderness. Left sinus exhibits no frontal sinus tenderness.   Mouth/Throat: Posterior oropharyngeal edema and posterior oropharyngeal erythema present. No oropharyngeal exudate.   Eyes: Pupils are equal, round, and reactive to light. Conjunctivae, EOM and lids are normal. Right eye exhibits no discharge. Left eye exhibits no discharge. No scleral icterus.   Neck: Normal range of motion. Neck supple. Carotid bruit is not present. No thyromegaly present.   Cardiovascular: Normal rate, regular rhythm, normal heart sounds and intact distal pulses. Exam reveals no gallop and no friction rub.   No murmur heard.  Pulmonary/Chest: Effort normal and breath sounds normal. No stridor. No respiratory distress. She has no wheezes. She has no rales.   Abdominal: Soft. Bowel sounds are normal. There is no tenderness.   Musculoskeletal: Normal range of motion.   Lymphadenopathy:     She has no cervical " adenopathy.   Neurological: She is alert and oriented to person, place, and time.   Skin: Skin is warm, dry and intact. She is not diaphoretic.   Psychiatric: She has a normal mood and affect. She expresses no suicidal plans.      Assessment:       1. Non-recurrent acute suppurative otitis media of right ear without spontaneous rupture of tympanic membrane    2. Seasonal allergic rhinitis due to pollen        Plan:       Non-recurrent acute suppurative otitis media of right ear without spontaneous rupture of tympanic membrane  -     amoxicillin-clavulanate 875-125mg (AUGMENTIN) 875-125 mg per tablet; Take 1 tablet by mouth 2 (two) times daily.  Dispense: 20 tablet; Refill: 0   OTC Claritin D 12 hour and mucinex DM in am; Nyquil or similar at bedtime; use nasal sprays bid; understanding voiced.    Seasonal allergic rhinitis due to pollen  -     azelastine (ASTELIN) 137 mcg (0.1 %) nasal spray; 1 spray (137 mcg total) by Nasal route 2 (two) times daily.  Dispense: 30 mL; Refill: 3  -     fluticasone propionate (FLONASE) 50 mcg/actuation nasal spray; 1 spray (50 mcg total) by Each Nostril route 2 (two) times daily.  Dispense: 1 Bottle; Refill: 3        Follow up if symptoms worsen or fail to improve.      11/11/2019 ELIZABETH Finn, FNP

## 2019-11-17 ENCOUNTER — PATIENT MESSAGE (OUTPATIENT)
Dept: FAMILY MEDICINE | Facility: CLINIC | Age: 34
End: 2019-11-17

## 2019-11-18 RX ORDER — PROMETHAZINE HYDROCHLORIDE AND DEXTROMETHORPHAN HYDROBROMIDE 6.25; 15 MG/5ML; MG/5ML
10 SYRUP ORAL EVERY 8 HOURS PRN
Qty: 240 ML | Refills: 0 | Status: SHIPPED | OUTPATIENT
Start: 2019-11-18 | End: 2019-11-23

## 2019-12-20 ENCOUNTER — PATIENT MESSAGE (OUTPATIENT)
Dept: FAMILY MEDICINE | Facility: CLINIC | Age: 34
End: 2019-12-20

## 2019-12-20 DIAGNOSIS — F41.8 ANXIETY WITH DEPRESSION: Primary | ICD-10-CM

## 2019-12-23 RX ORDER — SERTRALINE HYDROCHLORIDE 50 MG/1
50 TABLET, FILM COATED ORAL DAILY
Qty: 30 TABLET | Refills: 2 | Status: SHIPPED | OUTPATIENT
Start: 2019-12-23 | End: 2020-04-06

## 2020-02-13 DIAGNOSIS — E05.10 TOXIC THYROID NODULE: Primary | ICD-10-CM

## 2020-02-17 ENCOUNTER — OFFICE VISIT (OUTPATIENT)
Dept: FAMILY MEDICINE | Facility: CLINIC | Age: 35
End: 2020-02-17
Payer: COMMERCIAL

## 2020-02-17 VITALS
HEIGHT: 67 IN | OXYGEN SATURATION: 95 % | BODY MASS INDEX: 45.99 KG/M2 | SYSTOLIC BLOOD PRESSURE: 122 MMHG | WEIGHT: 293 LBS | HEART RATE: 72 BPM | TEMPERATURE: 98 F | DIASTOLIC BLOOD PRESSURE: 76 MMHG

## 2020-02-17 DIAGNOSIS — J20.9 ACUTE BRONCHITIS, UNSPECIFIED ORGANISM: Primary | ICD-10-CM

## 2020-02-17 DIAGNOSIS — R05.9 COUGH: ICD-10-CM

## 2020-02-17 PROCEDURE — 3008F PR BODY MASS INDEX (BMI) DOCUMENTED: ICD-10-PCS | Mod: S$GLB,,, | Performed by: NURSE PRACTITIONER

## 2020-02-17 PROCEDURE — 99213 OFFICE O/P EST LOW 20 MIN: CPT | Mod: S$GLB,,, | Performed by: NURSE PRACTITIONER

## 2020-02-17 PROCEDURE — 99213 PR OFFICE/OUTPT VISIT, EST, LEVL III, 20-29 MIN: ICD-10-PCS | Mod: S$GLB,,, | Performed by: NURSE PRACTITIONER

## 2020-02-17 PROCEDURE — 3008F BODY MASS INDEX DOCD: CPT | Mod: S$GLB,,, | Performed by: NURSE PRACTITIONER

## 2020-02-17 RX ORDER — PROMETHAZINE HYDROCHLORIDE AND DEXTROMETHORPHAN HYDROBROMIDE 6.25; 15 MG/5ML; MG/5ML
5 SYRUP ORAL NIGHTLY PRN
Qty: 118 ML | Refills: 0 | Status: SHIPPED | OUTPATIENT
Start: 2020-02-17 | End: 2020-09-16

## 2020-02-17 RX ORDER — SIMVASTATIN 20 MG/1
TABLET, FILM COATED ORAL
COMMUNITY
Start: 2019-12-11 | End: 2021-09-22 | Stop reason: ALTCHOICE

## 2020-02-17 RX ORDER — AZITHROMYCIN 250 MG/1
TABLET, FILM COATED ORAL
Qty: 6 TABLET | Refills: 0 | Status: SHIPPED | OUTPATIENT
Start: 2020-02-17 | End: 2020-09-16

## 2020-02-17 NOTE — PROGRESS NOTES
SUBJECTIVE:      Patient ID: Marguerite Colon is a 34 y.o. female.    Chief Complaint: Cough (x over 1 week) and Nasal Congestion    Established patient here for a sick visit today.     Cough   This is a recurrent problem. The current episode started 1 to 4 weeks ago. The problem has been unchanged. The problem occurs every few minutes. The cough is non-productive. Associated symptoms include chest pain, nasal congestion, rhinorrhea and wheezing. Pertinent negatives include no chills, ear congestion, ear pain, fever, headaches, heartburn, hemoptysis, myalgias, postnasal drip, rash, sore throat, shortness of breath, sweats or weight loss. The symptoms are aggravated by exercise and lying down. She has tried a beta-agonist inhaler for the symptoms. The treatment provided no relief. Her past medical history is significant for bronchitis. There is no history of asthma, bronchiectasis, COPD, emphysema, environmental allergies or pneumonia.       Family History   Problem Relation Age of Onset    Diabetes Mother     Heart disease Mother     Stroke Mother     Hypertension Mother     Diabetes Father     Ovarian cancer Neg Hx     Uterine cancer Neg Hx     Breast cancer Neg Hx     Colon cancer Neg Hx       Social History     Socioeconomic History    Marital status:      Spouse name: Not on file    Number of children: Not on file    Years of education: Not on file    Highest education level: Not on file   Occupational History    Not on file   Social Needs    Financial resource strain: Not on file    Food insecurity:     Worry: Not on file     Inability: Not on file    Transportation needs:     Medical: Not on file     Non-medical: Not on file   Tobacco Use    Smoking status: Former Smoker     Packs/day: 0.25     Years: 10.00     Pack years: 2.50     Types: Cigarettes     Last attempt to quit: 2015     Years since quittin.0    Smokeless tobacco: Never Used   Substance and Sexual Activity     "Alcohol use: No    Drug use: No    Sexual activity: Not Currently     Partners: Male   Lifestyle    Physical activity:     Days per week: Not on file     Minutes per session: Not on file    Stress: Not at all   Relationships    Social connections:     Talks on phone: Not on file     Gets together: Not on file     Attends Christianity service: Not on file     Active member of club or organization: Not on file     Attends meetings of clubs or organizations: Not on file     Relationship status: Not on file   Other Topics Concern    Not on file   Social History Narrative    Not on file     Current Outpatient Medications   Medication Sig Dispense Refill    alprazolam (XANAX) 1 MG tablet take 1 tablet by mouth at bedtime if needed for insomnia 30 tablet 3    azelastine (ASTELIN) 137 mcg (0.1 %) nasal spray 1 spray (137 mcg total) by Nasal route 2 (two) times daily. 30 mL 3    fluticasone propionate (FLONASE) 50 mcg/actuation nasal spray 1 spray (50 mcg total) by Each Nostril route 2 (two) times daily. 1 Bottle 3    HUMULIN R U-500 "CONCENTRATED" 500 unit/mL Soln Inject into the skin 3 (three) times daily.   0    insulin needles, disposable, 30 x 1/3 " Ndle Uses 4 daily, on multiple daily insulin injections 150 each 12    metformin (GLUCOPHAGE) 500 MG tablet Take 500 mg by mouth 2 (two) times daily.       OZEMPIC 1 mg/dose (2 mg/1.5 mL) PnIj       sertraline (ZOLOFT) 50 MG tablet Take 1 tablet (50 mg total) by mouth once daily. 30 tablet 2    triamcinolone acetonide 0.1% (KENALOG) 0.1 % cream Apply topically 2 (two) times daily.      azithromycin (Z-MARY) 250 MG tablet Take 2 po on day 1 then 1 tab po on days 2-5 6 tablet 0    promethazine-dextromethorphan (PROMETHAZINE-DM) 6.25-15 mg/5 mL Syrp Take 5 mLs by mouth nightly as needed. 118 mL 0    simvastatin (ZOCOR) 20 MG tablet        No current facility-administered medications for this visit.      Review of patient's allergies indicates:   Allergen " Reactions    Carboplatin      Had to go to ER.     Tuberculin ppd Rash     localized      Past Medical History:   Diagnosis Date    Anxiety     Bilateral headaches 4/1/2016    Cirrhosis     Diabetes mellitus     type 2    Hepatomegaly Aug 2014    Morbidly obese     Obesity, Class III, BMI 40-49.9 (morbid obesity) 4/1/2016    Ovarian cancer 12/2012    grade 1 endometrioid adenoca of the ovary    Pancreatitis     Small bowel obstruction due to adhesions Dec 2015     Thyroid disease     hypothyroid    Thyroid nodule      Past Surgical History:   Procedure Laterality Date    ABDOMINAL ADHESION SURGERY      done at same time with ovarian cystectomy    CHOLECYSTECTOMY  dec 2015    HERNIA REPAIR  12/28/2015    25 x 20 cm Venrtralex ST mesh for repair done at time of [facility] for SBO.     HYSTERECTOMY  2 /25/2013    adeline rso. prior lso. ovarian ca    leep      positive margins    left salpingo oophrectomy  7/10    hemorrhagic cyst    OMENTECTOMY      partial right oophrectomy  12/12    LMP of ovary    ND REMOVAL OF OVARY/TUBE(S)      x lap  12/28/2015    post open cholecystectomy 2/21/2015.        Review of Systems   Constitutional: Positive for fatigue. Negative for appetite change, chills, diaphoresis, fever and weight loss.   HENT: Positive for congestion and rhinorrhea. Negative for ear discharge, ear pain, postnasal drip, sinus pressure, sinus pain, sneezing, sore throat and trouble swallowing.    Eyes: Negative for discharge and itching.   Respiratory: Positive for cough and wheezing. Negative for hemoptysis, chest tightness and shortness of breath.    Cardiovascular: Positive for chest pain.   Gastrointestinal: Negative for abdominal pain, diarrhea, heartburn, nausea and vomiting.   Genitourinary: Negative for dysuria and frequency.   Musculoskeletal: Negative for myalgias.   Skin: Negative for rash.   Allergic/Immunologic: Negative for environmental allergies.   Neurological: Negative for dizziness  "and headaches.   Hematological: Negative for adenopathy.      OBJECTIVE:      Vitals:    02/17/20 1515   BP: 122/76   BP Location: Left arm   Patient Position: Sitting   BP Method: X-Large (Manual)   Pulse: 72   Temp: 98 °F (36.7 °C)   TempSrc: Oral   SpO2: 95%   Weight: (!) 144.7 kg (319 lb)   Height: 5' 7" (1.702 m)     Physical Exam   Constitutional: She is oriented to person, place, and time. She appears well-developed and well-nourished. No distress.   HENT:   Head: Normocephalic and atraumatic.   Right Ear: Tympanic membrane, external ear and ear canal normal.   Left Ear: Tympanic membrane, external ear and ear canal normal.   Nose: Rhinorrhea present. No mucosal edema. No epistaxis. Right sinus exhibits no maxillary sinus tenderness and no frontal sinus tenderness. Left sinus exhibits no maxillary sinus tenderness and no frontal sinus tenderness.   Mouth/Throat: Uvula is midline and mucous membranes are normal. Oropharyngeal exudate (clear rhinorrhea ) present. No posterior oropharyngeal edema or posterior oropharyngeal erythema. Tonsils are 1+ on the right. Tonsils are 1+ on the left. No tonsillar exudate.   Eyes: Pupils are equal, round, and reactive to light. Conjunctivae are normal.   Neck: Normal range of motion. Neck supple.   Cardiovascular: Normal rate, regular rhythm and normal heart sounds.   Pulmonary/Chest: Effort normal and breath sounds normal. No respiratory distress. She has no wheezes. She has no rales.   Harsh cough   Abdominal: Soft. Bowel sounds are normal. There is no tenderness.   Lymphadenopathy:     She has no cervical adenopathy.   Neurological: She is alert and oriented to person, place, and time.   Skin: Skin is warm and dry. No rash noted.   Psychiatric: She has a normal mood and affect. Her behavior is normal. Judgment and thought content normal.   Nursing note and vitals reviewed.     Assessment:       1. Acute bronchitis, unspecified organism    2. Cough        Plan:       Acute " bronchitis, unspecified organism  -     azithromycin (Z-MARY) 250 MG tablet; Take 2 po on day 1 then 1 tab po on days 2-5  Dispense: 6 tablet; Refill: 0    Cough  -     promethazine-dextromethorphan (PROMETHAZINE-DM) 6.25-15 mg/5 mL Syrp; Take 5 mLs by mouth nightly as needed.  Dispense: 118 mL; Refill: 0    *discussed Mucinex daily, antibiotics as prescribed, continue ALBA inhaler prn and cough syrup at bedtime     Follow up in about 1 month (around 3/17/2020) for f/u DM .      2/18/2020 ELIZABETH Santos, FNP

## 2020-02-17 NOTE — PATIENT INSTRUCTIONS
Acute Bronchitis  Your healthcare provider has told you that you have acute bronchitis. Bronchitis is infection or inflammation of the bronchial tubes (airways in the lungs). Normally, air moves easily in and out of the airways. Bronchitis narrows the airways, making it harder for air to flow in and out of the lungs. This causes symptoms such as shortness of breath, coughing up yellow or green mucus, and wheezing. Bronchitis can be acute or chronic. Acute means the condition comes on quickly and goes away in a short time, usually within 3 to 10 days. Chronic means a condition lasts a long time and often comes back.    What causes acute bronchitis?  Acute bronchitis almost always starts as a viral respiratory infection, such as a cold or the flu. Certain factors make it more likely for a cold or flu to turn into bronchitis. These include being very young, being elderly, having a heart or lung problem, or having a weak immune system. Cigarette smoking also makes bronchitis more likely.  When bronchitis develops, the airways become swollen. The airways may also become infected with bacteria. This is known as a secondary infection.  Diagnosing acute bronchitis  Your healthcare provider will examine you and ask about your symptoms and health history. You may also have a sputum culture to test the fluid in your lungs. Chest X-rays may be done to look for infection in the lungs.  Treating acute bronchitis  Bronchitis usually clears up as the cold or flu goes away. You can help feel better faster by doing the following:  · Take medicine as directed. You may be told to take ibuprofen or other over-the-counter medicines. These help relieve inflammation in your bronchial tubes. Your healthcare provider may prescribe an inhaler to help open up the bronchial tubes. Most of the time, acute bronchitis is caused by a viral infection. Antibiotics are usually not prescribed for viral infections.  · Drink plenty of fluids, such as  water, juice, or warm soup. Fluids loosen mucus so that you can cough it up. This helps you breathe more easily. Fluids also prevent dehydration.  · Make sure you get plenty of rest.  · Do not smoke. Do not allow anyone else to smoke in your home.  Recovery and follow-up  Follow up with your doctor as you are told. You will likely feel better in a week or two. But a dry cough can linger beyond that time. Let your doctor know if you still have symptoms (other than a dry cough) after 2 weeks, or if youre prone to getting bronchial infections. Take steps to protect yourself from future infections. These steps include stopping smoking and avoiding tobacco smoke, washing your hands often, and getting a yearly flu shot.  When to call your healthcare provider  Call the healthcare provider if you have any of the following:  · Fever of 100.4°F (38.0°C) or higher, or as advised  · Symptoms that get worse, or new symptoms  · Trouble breathing  · Symptoms that dont start to improve within a week, or within 3 days of taking antibiotics   Date Last Reviewed: 12/1/2016  © 1460-7638 [employer]. [address] [place] [address]. All rights reserved. This information is not intended as a substitute for professional medical care. Always follow your healthcare professional's instructions.        Bronchitis, Antibiotic Treatment (Adult)    Bronchitis is an infection of the air passages (bronchial tubes) in your lungs. It often occurs when you have a cold. This illness is contagious during the first few days and is spread through the air by coughing and sneezing, or by direct contact (touching the sick person and then touching your own eyes, nose, or mouth).  Symptoms of bronchitis include cough with mucus (phlegm) and low-grade fever. Bronchitis usually lasts 7 to 14 days. Mild cases can be treated with simple home remedies. More severe infection is treated with an antibiotic.  Home care  Follow these  guidelines when caring for yourself at home:  · If your symptoms are severe, rest at home for the first 2 to 3 days. When you go back to your usual activities, don't let yourself get too tired.  · Do not smoke. Also avoid being exposed to secondhand smoke.  · You may use over-the-counter medicines to control fever or pain, unless another medicine was prescribed. (Note: If you have chronic liver or kidney disease or have ever had a stomach ulcer or gastrointestinal bleeding, talk with your healthcare provider before using these medicines. Also talk to your provider if you are taking medicine to prevent blood clots.) Aspirin should never be given to anyone younger than 18 years of age who is ill with a viral infection or fever. It may cause severe liver or brain damage.  · Your appetite may be poor, so a light diet is fine. Avoid dehydration by drinking 6 to 8 glasses of fluids per day (such as water, soft drinks, sports drinks, juices, tea, or soup). Extra fluids will help loosen secretions in the nose and lungs.  · Over-the-counter cough, cold, and sore-throat medicines will not shorten the length of the illness, but they may be helpful to reduce symptoms. (Note: Do not use decongestants if you have high blood pressure.)  · Finish all antibiotic medicine. Do this even if you are feeling better after only a few days.  Follow-up care  Follow up with your healthcare provider, or as advised. If you had an X-ray or ECG (electrocardiogram), a specialist will review it. You will be notified of any new findings that may affect your care.  Note: If you are age 65 or older, or if you have a chronic lung disease or condition that affects your immune system, or you smoke, talk to your healthcare provider about having pneumococcal vaccinations and a yearly influenza vaccination (flu shot).  When to seek medical advice  Call your healthcare provider right away if any of these occur:  · Fever of 100.4°F (38°C) or  higher  · Coughing up increased amounts of colored sputum  · Weakness, drowsiness, headache, facial pain, ear pain, or a stiff neck  Call 911, or get immediate medical care  Contact emergency services right away if any of these occur.  · Coughing up blood  · Worsening weakness, drowsiness, headache, or stiff neck  · Trouble breathing, wheezing, or pain with breathing  Date Last Reviewed: 9/13/2015 © 2000-2017 [employer]. [address] [place] [address], [place] [address]. All rights reserved. This information is not intended as a substitute for professional medical care. Always follow your healthcare professional's instructions.        What Is Acute Bronchitis?  Acute bronchitis is when the airways in your lungs (bronchial tubes) become red and swollen (inflamed). It is usually caused by a viral infection. But it can also occur because of a bacteria or allergen. Symptoms include a cough that produces yellow or greenish mucus and can last for days or sometimes weeks.  Inside healthy lungs    Air travels in and out of the lungs through the airways. The linings of these airways produce sticky mucus. This mucus traps particles that enter the lungs. Tiny structures called cilia then sweep the particles out of the airways.     Healthy airway: Airways are normally open. Air moves in and out easily.      Healthy cilia: Tiny, hairlike cilia sweep mucus and particles up and out of the airways.   Lungs with bronchitis  Bronchitis often occurs with a cold or the flu virus. The airways become inflamed (red and swollen). There is a deep hacking cough from the extra mucus. Other symptoms may include:  · Wheezing  · Chest discomfort  · Shortness of breath  · Mild fever  A second infection, this time due to bacteria, may then occur. And airways irritated by allergens or smoke are more likely to get infected.        Inflamed airway: Inflammation and extra mucus narrow the airway, causing shortness of breath.      Impaired  cilia: Extra mucus impairs cilia, causing congestion and wheezing. Smoking makes the problem worse.   Making a diagnosis  A physical exam, health history, and certain tests help your healthcare provider make the diagnosis.  Health history  Your healthcare provider will ask you about your symptoms.  The exam  Your provider listens to your chest for signs of congestion. He or she may also check your ears, nose, and throat.  Possible tests  · A sputum test for bacteria. This requires a sample of mucus from your lungs.  · A nasal or throat swab. This tests to see if you have a bacterial infection.  · A chest X-ray. This is done if your healthcare provider thinks you have pneumonia.  · Tests to check for an underlying condition. Other tests may be done to check for things such as allergies, asthma, or COPD (chronic obstructive pulmonary disease). You may need to see a specialist for more lung function testing.  Treating a cough  The main treatment for bronchitis is easing symptoms. Avoiding smoke, allergens, and other things that trigger coughing can often help. If the infection is bacterial, you may be given antibiotics. During the illness, it's important to get plenty of sleep. To ease symptoms:  · Dont smoke. Also avoid secondhand smoke.  · Use a humidifier. Or try breathing in steam from a hot shower. This may help loosen mucus.  · Drink a lot of water and juice. They can soothe the throat and may help thin mucus.  · Sit up or use extra pillows when in bed. This helps to lessen coughing and congestion.  · Ask your provider about using medicine. Ask about using cough medicine, pain and fever medicine, or a decongestant.  Antibiotics  Most cases of bronchitis are caused by cold or flu viruses. They dont need antibiotics to treat them, even if your mucus is thick and green or yellow. Antibiotics dont treat viral illness and antibiotics have not been shown to have any benefit in cases of acute bronchitis. Taking  antibiotics when they are not needed increases your risk of getting an infection later that is antibiotic-resistant. Antibiotics can also cause severe cases of diarrhea that require other antibiotics to treat.  It is important that you accept your healthcare provider's opinion to not use antibiotics. Your provider will prescribe antibiotics if the infection is caused by bacteria. If they are prescribed:  · Take all of the medicine. Take the medicine until it is used up, even if symptoms have improved. If you dont, the bronchitis may come back.  · Take the medicines as directed. For instance, some medicines should be taken with food.  · Ask about side effects. Ask your provider or pharmacist what side effects are common, and what to do about them.  Follow-up care  You should see your provider again in 2 to 3 weeks. By this time, symptoms should have improved. An infection that lasts longer may mean you have a more serious problem.  Prevention  · Avoid tobacco smoke. If you smoke, quit. Stay away from smoky places. Ask friends and family not to smoke around you, or in your home or car.  · Get checked for allergies.  · Ask your provider about getting a yearly flu shot. Also ask about pneumococcal or pneumonia shots.  · Wash your hands often. This helps reduce the chance of picking up viruses that cause colds and flu.  Call your healthcare provider if:  · Symptoms worsen, or you have new symptoms  · Breathing problems worsen or  become severe  · Symptoms dont get better within a week, or within 3 days of taking antibiotics   Date Last Reviewed: 2/1/2017  © 0009-6070 [employer]. [address] [place] [address], [place] [address]. All rights reserved. This information is not intended as a substitute for professional medical care. Always follow your healthcare professional's instructions.

## 2020-02-19 ENCOUNTER — PATIENT MESSAGE (OUTPATIENT)
Dept: FAMILY MEDICINE | Facility: CLINIC | Age: 35
End: 2020-02-19

## 2020-02-19 DIAGNOSIS — J20.9 ACUTE BRONCHITIS, UNSPECIFIED ORGANISM: Primary | ICD-10-CM

## 2020-02-19 DIAGNOSIS — R05.9 COUGH: ICD-10-CM

## 2020-02-19 NOTE — TELEPHONE ENCOUNTER
Patient is requesting a refill on her Albuterol Inhaler. I do not see documentation of you prescribing her this medication   Should she return for a visit?

## 2020-04-04 DIAGNOSIS — F41.8 ANXIETY WITH DEPRESSION: ICD-10-CM

## 2020-04-06 RX ORDER — SERTRALINE HYDROCHLORIDE 50 MG/1
TABLET, FILM COATED ORAL
Qty: 30 TABLET | Refills: 2 | Status: SHIPPED | OUTPATIENT
Start: 2020-04-06 | End: 2020-07-20

## 2020-04-24 ENCOUNTER — PATIENT MESSAGE (OUTPATIENT)
Dept: FAMILY MEDICINE | Facility: CLINIC | Age: 35
End: 2020-04-24

## 2020-05-05 ENCOUNTER — HOSPITAL ENCOUNTER (OUTPATIENT)
Dept: RADIOLOGY | Facility: HOSPITAL | Age: 35
Discharge: HOME | End: 2020-05-05
Attending: INTERNAL MEDICINE
Payer: COMMERCIAL

## 2020-05-05 DIAGNOSIS — E05.10 TOXIC THYROID NODULE: ICD-10-CM

## 2020-05-05 PROCEDURE — 76536 US EXAM OF HEAD AND NECK: CPT | Mod: TC,PO

## 2020-09-16 ENCOUNTER — OFFICE VISIT (OUTPATIENT)
Dept: FAMILY MEDICINE | Facility: CLINIC | Age: 35
End: 2020-09-16
Payer: COMMERCIAL

## 2020-09-16 VITALS
WEIGHT: 293 LBS | DIASTOLIC BLOOD PRESSURE: 60 MMHG | HEART RATE: 73 BPM | SYSTOLIC BLOOD PRESSURE: 100 MMHG | BODY MASS INDEX: 45.99 KG/M2 | OXYGEN SATURATION: 95 % | HEIGHT: 67 IN | TEMPERATURE: 98 F

## 2020-09-16 DIAGNOSIS — G47.33 OBSTRUCTIVE SLEEP APNEA SYNDROME IN ADULT: ICD-10-CM

## 2020-09-16 DIAGNOSIS — F41.8 ANXIETY WITH DEPRESSION: Primary | ICD-10-CM

## 2020-09-16 DIAGNOSIS — Z79.4 TYPE 2 DIABETES MELLITUS WITHOUT COMPLICATION, WITH LONG-TERM CURRENT USE OF INSULIN: ICD-10-CM

## 2020-09-16 DIAGNOSIS — Z28.21 IMMUNIZATION REFUSED: ICD-10-CM

## 2020-09-16 DIAGNOSIS — E11.9 TYPE 2 DIABETES MELLITUS WITHOUT COMPLICATION, WITH LONG-TERM CURRENT USE OF INSULIN: ICD-10-CM

## 2020-09-16 PROCEDURE — 3008F BODY MASS INDEX DOCD: CPT | Mod: S$GLB,,, | Performed by: NURSE PRACTITIONER

## 2020-09-16 PROCEDURE — 1126F AMNT PAIN NOTED NONE PRSNT: CPT | Mod: S$GLB,,, | Performed by: NURSE PRACTITIONER

## 2020-09-16 PROCEDURE — 99213 OFFICE O/P EST LOW 20 MIN: CPT | Mod: S$GLB,,, | Performed by: NURSE PRACTITIONER

## 2020-09-16 PROCEDURE — 99213 PR OFFICE/OUTPT VISIT, EST, LEVL III, 20-29 MIN: ICD-10-PCS | Mod: S$GLB,,, | Performed by: NURSE PRACTITIONER

## 2020-09-16 PROCEDURE — 3008F PR BODY MASS INDEX (BMI) DOCUMENTED: ICD-10-PCS | Mod: S$GLB,,, | Performed by: NURSE PRACTITIONER

## 2020-09-16 PROCEDURE — 1126F PR PAIN SEVERITY QUANTIFIED, NO PAIN PRESENT: ICD-10-PCS | Mod: S$GLB,,, | Performed by: NURSE PRACTITIONER

## 2020-09-16 RX ORDER — SERTRALINE HYDROCHLORIDE 50 MG/1
50 TABLET, FILM COATED ORAL DAILY
Qty: 90 TABLET | Refills: 1 | Status: SHIPPED | OUTPATIENT
Start: 2020-09-16 | End: 2021-04-14

## 2020-09-16 NOTE — PROGRESS NOTES
SUBJECTIVE:      Patient ID: Marguerite Colon is a 35 y.o. female.    Chief Complaint: Follow-up    Established patient here for follow up and medication refills.  She has been taking the Zoloft as prescribed without side effects or complaints.  She feels her anxiety and depression are well controlled on this regimen.  She is continuing her follow-up care with endocrine Dr. Ruiz for diabetes and thyroid nodule. Her labs are UTD but she cannot recall what her last A1C level. I have requested copies today of her labs for review. Her eye exam is also UTD- need copies to review.  She also is using her CPAP nightly for obstructive sleep apnea without any complaints.  She declines the flu vaccine recommended to her today and will contemplate the pneumonia vaccine recommended her today.      Family History   Problem Relation Age of Onset    Diabetes Mother     Heart disease Mother     Stroke Mother     Hypertension Mother     Diabetes Father     Ovarian cancer Neg Hx     Uterine cancer Neg Hx     Breast cancer Neg Hx     Colon cancer Neg Hx       Social History     Socioeconomic History    Marital status:      Spouse name: Not on file    Number of children: Not on file    Years of education: Not on file    Highest education level: Not on file   Occupational History    Not on file   Social Needs    Financial resource strain: Not on file    Food insecurity     Worry: Not on file     Inability: Not on file    Transportation needs     Medical: Not on file     Non-medical: Not on file   Tobacco Use    Smoking status: Former Smoker     Packs/day: 0.25     Years: 10.00     Pack years: 2.50     Types: Cigarettes     Quit date: 2015     Years since quittin.6    Smokeless tobacco: Never Used   Substance and Sexual Activity    Alcohol use: No    Drug use: No    Sexual activity: Not Currently     Partners: Male   Lifestyle    Physical activity     Days per week: Not on file     Minutes per  "session: Not on file    Stress: Not at all   Relationships    Social connections     Talks on phone: Not on file     Gets together: Not on file     Attends Latter day service: Not on file     Active member of club or organization: Not on file     Attends meetings of clubs or organizations: Not on file     Relationship status: Not on file   Other Topics Concern    Not on file   Social History Narrative    Not on file     Current Outpatient Medications   Medication Sig Dispense Refill    HUMULIN R U-500 "CONCENTRATED" 500 unit/mL Soln Inject into the skin 3 (three) times daily.   0    insulin needles, disposable, 30 x 1/3 " Ndle Uses 4 daily, on multiple daily insulin injections 150 each 12    metformin (GLUCOPHAGE) 500 MG tablet Take 500 mg by mouth 2 (two) times daily.       OZEMPIC 1 mg/dose (2 mg/1.5 mL) PnIj       sertraline (ZOLOFT) 50 MG tablet Take 1 tablet (50 mg total) by mouth once daily. 90 tablet 1    simvastatin (ZOCOR) 20 MG tablet       triamcinolone acetonide 0.1% (KENALOG) 0.1 % cream Apply topically 2 (two) times daily.       No current facility-administered medications for this visit.      Review of patient's allergies indicates:   Allergen Reactions    Carboplatin      Had to go to ER.     Tuberculin ppd Rash     localized      Past Medical History:   Diagnosis Date    Anxiety     Bilateral headaches 4/1/2016    Cirrhosis     Diabetes mellitus     type 2    Hepatomegaly Aug 2014    Morbidly obese     Obesity, Class III, BMI 40-49.9 (morbid obesity) 4/1/2016    Ovarian cancer 12/2012    grade 1 endometrioid adenoca of the ovary    Pancreatitis     Small bowel obstruction due to adhesions Dec 2015     Thyroid disease     hypothyroid    Thyroid nodule      Past Surgical History:   Procedure Laterality Date    ABDOMINAL ADHESION SURGERY      done at same time with ovarian cystectomy    CHOLECYSTECTOMY  dec 2015    HERNIA REPAIR  12/28/2015    25 x 20 cm Venrtralex ST mesh " "for repair done at time of [facility] for SBO.     HYSTERECTOMY  2 /25/2013    adeline rso. prior lso. ovarian ca    leep      positive margins    left salpingo oophrectomy  7/10    hemorrhagic cyst    OMENTECTOMY      partial right oophrectomy  12/12    LMP of ovary    OK REMOVAL OF OVARY/TUBE(S)      x lap  12/28/2015    post open cholecystectomy 2/21/2015.        Review of Systems   Constitutional: Negative for activity change, chills, fatigue, fever and unexpected weight change.   HENT: Negative for congestion, hearing loss, rhinorrhea and trouble swallowing.    Eyes: Negative for pain, discharge and visual disturbance.   Respiratory: Positive for apnea (uses CPAP ). Negative for chest tightness, shortness of breath and wheezing.    Cardiovascular: Negative for chest pain and palpitations.   Gastrointestinal: Negative for abdominal pain, blood in stool, constipation, diarrhea, nausea and vomiting.   Endocrine: Negative for cold intolerance, heat intolerance, polydipsia and polyuria.   Genitourinary: Negative for difficulty urinating, dysuria, frequency, hematuria and menstrual problem.   Musculoskeletal: Negative for arthralgias, joint swelling and neck pain.   Skin: Negative for color change, rash and wound.   Neurological: Negative for weakness and headaches.   Psychiatric/Behavioral: Negative for confusion, dysphoric mood, sleep disturbance and suicidal ideas. The patient is nervous/anxious.       OBJECTIVE:      Vitals:    09/16/20 1533   BP: 100/60   BP Location: Left arm   Patient Position: Sitting   BP Method: X-Large (Manual)   Pulse: 73   Temp: 97.6 °F (36.4 °C)   TempSrc: Temporal   SpO2: 95%   Weight: (!) 148.6 kg (327 lb 9.6 oz)   Height: 5' 7" (1.702 m)     Physical Exam  Vitals signs and nursing note reviewed.   Constitutional:       General: She is not in acute distress.     Appearance: Normal appearance. She is well-developed. She is obese. She is not ill-appearing or diaphoretic.      Comments: " Morbidly Obese    HENT:      Head: Normocephalic.      Right Ear: Tympanic membrane, ear canal and external ear normal.      Left Ear: Tympanic membrane, ear canal and external ear normal.   Eyes:      Extraocular Movements: Extraocular movements intact.      Conjunctiva/sclera: Conjunctivae normal.      Pupils: Pupils are equal, round, and reactive to light.   Neck:      Musculoskeletal: Normal range of motion and neck supple.   Cardiovascular:      Rate and Rhythm: Normal rate and regular rhythm.      Pulses:           Dorsalis pedis pulses are 2+ on the right side and 2+ on the left side.        Posterior tibial pulses are 2+ on the right side and 2+ on the left side.      Heart sounds: Normal heart sounds. No murmur.   Pulmonary:      Effort: Pulmonary effort is normal. No respiratory distress.      Breath sounds: Normal breath sounds. No wheezing or rales.   Abdominal:      General: Bowel sounds are normal.      Palpations: Abdomen is soft.      Tenderness: There is no abdominal tenderness.   Musculoskeletal: Normal range of motion.      Right lower leg: No edema.      Left lower leg: No edema.      Right foot: Normal range of motion. No deformity.      Left foot: Normal range of motion. No deformity.   Feet:      Right foot:      Protective Sensation: 6 sites tested. 6 sites sensed.      Skin integrity: No ulcer, blister, skin breakdown, erythema, warmth, callus or dry skin.      Toenail Condition: Right toenails are normal.      Left foot:      Protective Sensation: 6 sites tested. 6 sites sensed.      Skin integrity: No ulcer, blister, skin breakdown, erythema, warmth, callus or dry skin.      Toenail Condition: Left toenails are normal.   Lymphadenopathy:      Cervical: No cervical adenopathy.   Skin:     General: Skin is warm and dry.      Coloration: Skin is not pale.   Neurological:      Mental Status: She is alert and oriented to person, place, and time.      Motor: No abnormal muscle tone.    Psychiatric:         Mood and Affect: Mood normal.         Behavior: Behavior normal.         Thought Content: Thought content normal.         Judgment: Judgment normal.        Assessment:       1. Anxiety with depression    2. Type 2 diabetes mellitus without complication, with long-term current use of insulin    3. Obstructive sleep apnea syndrome in adult    4. Immunization refused        Plan:       Anxiety with depression  -     sertraline (ZOLOFT) 50 MG tablet; Take 1 tablet (50 mg total) by mouth once daily.  Dispense: 90 tablet; Refill: 1    *controlled on regimen     Type 2 diabetes mellitus without complication, with long-term current use of insulin   *will request records from Dr. Ruiz- also request eye exam records    Obstructive sleep apnea syndrome in adult    Immunization refused        Follow up in about 6 months (around 3/16/2021) for f/u DM.      9/16/2020 ELIZABETH Santos, [place]

## 2020-09-23 ENCOUNTER — TELEPHONE (OUTPATIENT)
Dept: FAMILY MEDICINE | Facility: CLINIC | Age: 35
End: 2020-09-23

## 2020-09-23 NOTE — TELEPHONE ENCOUNTER
----- Message from ABDIRASHID Waldron sent at 9/22/2020 10:18 AM CDT -----  Please tell her I reviewed the labs from 2/20- she is overdue and needs new labs including a CBC. She can have the results faxed to me from endocrine but I will need these soon or will been to order my own.   ----- Message -----  From: Lisa Dent LPN  Sent: 9/17/2020   3:31 PM CDT  To: ABDIRASHID Waldron      ----- Message -----  From: Radha Henderson  Sent: 9/17/2020   3:18 PM CDT  To: [facility] Know[facility] Staff    LAB, [facility] MXR, 2/8/20

## 2020-09-23 NOTE — TELEPHONE ENCOUNTER
Spoke with pt about lab work pt going to see endocrine doctor next month, pt will get labs sent over.

## 2020-11-30 ENCOUNTER — PATIENT MESSAGE (OUTPATIENT)
Dept: FAMILY MEDICINE | Facility: CLINIC | Age: 35
End: 2020-11-30

## 2021-02-11 ENCOUNTER — OFFICE VISIT (OUTPATIENT)
Dept: FAMILY MEDICINE | Facility: CLINIC | Age: 36
End: 2021-02-11
Payer: COMMERCIAL

## 2021-02-11 ENCOUNTER — PATIENT MESSAGE (OUTPATIENT)
Dept: FAMILY MEDICINE | Facility: CLINIC | Age: 36
End: 2021-02-11

## 2021-02-11 VITALS
WEIGHT: 293 LBS | TEMPERATURE: 98 F | SYSTOLIC BLOOD PRESSURE: 130 MMHG | DIASTOLIC BLOOD PRESSURE: 80 MMHG | BODY MASS INDEX: 45.99 KG/M2 | HEIGHT: 67 IN | HEART RATE: 77 BPM | OXYGEN SATURATION: 97 %

## 2021-02-11 DIAGNOSIS — R21 ERYTHEMATOUS RASH: Primary | ICD-10-CM

## 2021-02-11 PROCEDURE — 3008F BODY MASS INDEX DOCD: CPT | Mod: S$GLB,,, | Performed by: NURSE PRACTITIONER

## 2021-02-11 PROCEDURE — 99213 PR OFFICE/OUTPT VISIT, EST, LEVL III, 20-29 MIN: ICD-10-PCS | Mod: S$GLB,,, | Performed by: NURSE PRACTITIONER

## 2021-02-11 PROCEDURE — 3008F PR BODY MASS INDEX (BMI) DOCUMENTED: ICD-10-PCS | Mod: S$GLB,,, | Performed by: NURSE PRACTITIONER

## 2021-02-11 PROCEDURE — 99213 OFFICE O/P EST LOW 20 MIN: CPT | Mod: S$GLB,,, | Performed by: NURSE PRACTITIONER

## 2021-02-11 PROCEDURE — 1126F AMNT PAIN NOTED NONE PRSNT: CPT | Mod: S$GLB,,, | Performed by: NURSE PRACTITIONER

## 2021-02-11 PROCEDURE — 1126F PR PAIN SEVERITY QUANTIFIED, NO PAIN PRESENT: ICD-10-PCS | Mod: S$GLB,,, | Performed by: NURSE PRACTITIONER

## 2021-02-11 RX ORDER — NYSTATIN 100000 U/G
OINTMENT TOPICAL 2 TIMES DAILY
Qty: 15 G | Refills: 0 | Status: SHIPPED | OUTPATIENT
Start: 2021-02-11 | End: 2021-09-22

## 2021-02-18 ENCOUNTER — OFFICE VISIT (OUTPATIENT)
Dept: FAMILY MEDICINE | Facility: CLINIC | Age: 36
End: 2021-02-18
Payer: COMMERCIAL

## 2021-02-18 ENCOUNTER — TELEPHONE (OUTPATIENT)
Dept: FAMILY MEDICINE | Facility: CLINIC | Age: 36
End: 2021-02-18

## 2021-02-18 VITALS
OXYGEN SATURATION: 98 % | BODY MASS INDEX: 45.99 KG/M2 | HEIGHT: 67 IN | HEART RATE: 72 BPM | DIASTOLIC BLOOD PRESSURE: 70 MMHG | WEIGHT: 293 LBS | SYSTOLIC BLOOD PRESSURE: 120 MMHG | TEMPERATURE: 98 F

## 2021-02-18 DIAGNOSIS — M62.830 MUSCLE SPASM OF BACK: ICD-10-CM

## 2021-02-18 DIAGNOSIS — M54.50 ACUTE BILATERAL LOW BACK PAIN WITHOUT SCIATICA: Primary | ICD-10-CM

## 2021-02-18 PROCEDURE — 3008F PR BODY MASS INDEX (BMI) DOCUMENTED: ICD-10-PCS | Mod: S$GLB,,, | Performed by: NURSE PRACTITIONER

## 2021-02-18 PROCEDURE — 96372 THER/PROPH/DIAG INJ SC/IM: CPT | Mod: S$GLB,,, | Performed by: NURSE PRACTITIONER

## 2021-02-18 PROCEDURE — 99213 OFFICE O/P EST LOW 20 MIN: CPT | Mod: 25,S$GLB,, | Performed by: NURSE PRACTITIONER

## 2021-02-18 PROCEDURE — 96372 PR INJECTION,THERAP/PROPH/DIAG2ST, IM OR SUBCUT: ICD-10-PCS | Mod: S$GLB,,, | Performed by: NURSE PRACTITIONER

## 2021-02-18 PROCEDURE — 1125F PR PAIN SEVERITY QUANTIFIED, PAIN PRESENT: ICD-10-PCS | Mod: S$GLB,,, | Performed by: NURSE PRACTITIONER

## 2021-02-18 PROCEDURE — 1125F AMNT PAIN NOTED PAIN PRSNT: CPT | Mod: S$GLB,,, | Performed by: NURSE PRACTITIONER

## 2021-02-18 PROCEDURE — 99213 PR OFFICE/OUTPT VISIT, EST, LEVL III, 20-29 MIN: ICD-10-PCS | Mod: 25,S$GLB,, | Performed by: NURSE PRACTITIONER

## 2021-02-18 PROCEDURE — 3008F BODY MASS INDEX DOCD: CPT | Mod: S$GLB,,, | Performed by: NURSE PRACTITIONER

## 2021-02-18 RX ORDER — KETOROLAC TROMETHAMINE 30 MG/ML
30 INJECTION, SOLUTION INTRAMUSCULAR; INTRAVENOUS ONCE
Status: COMPLETED | OUTPATIENT
Start: 2021-02-18 | End: 2021-02-18

## 2021-02-18 RX ORDER — CYCLOBENZAPRINE HCL 10 MG
10 TABLET ORAL NIGHTLY PRN
Qty: 30 TABLET | Refills: 0 | Status: SHIPPED | OUTPATIENT
Start: 2021-02-18 | End: 2021-02-28

## 2021-02-18 RX ADMIN — KETOROLAC TROMETHAMINE 30 MG: 30 INJECTION, SOLUTION INTRAMUSCULAR; INTRAVENOUS at 02:02

## 2021-02-22 ENCOUNTER — PATIENT MESSAGE (OUTPATIENT)
Dept: FAMILY MEDICINE | Facility: CLINIC | Age: 36
End: 2021-02-22

## 2021-02-25 ENCOUNTER — PATIENT MESSAGE (OUTPATIENT)
Dept: FAMILY MEDICINE | Facility: CLINIC | Age: 36
End: 2021-02-25

## 2021-03-01 ENCOUNTER — PATIENT MESSAGE (OUTPATIENT)
Dept: FAMILY MEDICINE | Facility: CLINIC | Age: 36
End: 2021-03-01

## 2021-03-13 LAB
BASOPHILS # BLD AUTO: 87 CELLS/UL (ref 0–200)
BASOPHILS NFR BLD AUTO: 1 %
EOSINOPHIL # BLD AUTO: 296 CELLS/UL (ref 15–500)
EOSINOPHIL NFR BLD AUTO: 3.4 %
ERYTHROCYTE [DISTWIDTH] IN BLOOD BY AUTOMATED COUNT: 13.7 % (ref 11–15)
HCT VFR BLD AUTO: 40.7 % (ref 35–45)
HGB BLD-MCNC: 13.2 G/DL (ref 11.7–15.5)
LYMPHOCYTES # BLD AUTO: 3454 CELLS/UL (ref 850–3900)
LYMPHOCYTES NFR BLD AUTO: 39.7 %
MCH RBC QN AUTO: 28.4 PG (ref 27–33)
MCHC RBC AUTO-ENTMCNC: 32.4 G/DL (ref 32–36)
MCV RBC AUTO: 87.5 FL (ref 80–100)
MONOCYTES # BLD AUTO: 548 CELLS/UL (ref 200–950)
MONOCYTES NFR BLD AUTO: 6.3 %
NEUTROPHILS # BLD AUTO: 4315 CELLS/UL (ref 1500–7800)
NEUTROPHILS NFR BLD AUTO: 49.6 %
PLATELET # BLD AUTO: 201 THOUSAND/UL (ref 140–400)
PMV BLD REES-ECKER: 11.1 FL (ref 7.5–12.5)
RBC # BLD AUTO: 4.65 MILLION/UL (ref 3.8–5.1)
WBC # BLD AUTO: 8.7 THOUSAND/UL (ref 3.8–10.8)

## 2021-03-17 ENCOUNTER — OFFICE VISIT (OUTPATIENT)
Dept: FAMILY MEDICINE | Facility: CLINIC | Age: 36
End: 2021-03-17
Payer: COMMERCIAL

## 2021-03-17 VITALS
BODY MASS INDEX: 45.99 KG/M2 | SYSTOLIC BLOOD PRESSURE: 132 MMHG | TEMPERATURE: 98 F | OXYGEN SATURATION: 97 % | WEIGHT: 293 LBS | DIASTOLIC BLOOD PRESSURE: 82 MMHG | HEIGHT: 67 IN | HEART RATE: 78 BPM

## 2021-03-17 DIAGNOSIS — E04.1 THYROID NODULE: ICD-10-CM

## 2021-03-17 DIAGNOSIS — Z79.4 TYPE 2 DIABETES MELLITUS WITHOUT COMPLICATION, WITH LONG-TERM CURRENT USE OF INSULIN: Primary | ICD-10-CM

## 2021-03-17 DIAGNOSIS — Z23 IMMUNIZATION DUE: ICD-10-CM

## 2021-03-17 DIAGNOSIS — E11.9 TYPE 2 DIABETES MELLITUS WITHOUT COMPLICATION, WITH LONG-TERM CURRENT USE OF INSULIN: Primary | ICD-10-CM

## 2021-03-17 PROCEDURE — 99213 PR OFFICE/OUTPT VISIT, EST, LEVL III, 20-29 MIN: ICD-10-PCS | Mod: S$GLB,,, | Performed by: NURSE PRACTITIONER

## 2021-03-17 PROCEDURE — 3008F PR BODY MASS INDEX (BMI) DOCUMENTED: ICD-10-PCS | Mod: S$GLB,,, | Performed by: NURSE PRACTITIONER

## 2021-03-17 PROCEDURE — 1126F AMNT PAIN NOTED NONE PRSNT: CPT | Mod: S$GLB,,, | Performed by: NURSE PRACTITIONER

## 2021-03-17 PROCEDURE — 1126F PR PAIN SEVERITY QUANTIFIED, NO PAIN PRESENT: ICD-10-PCS | Mod: S$GLB,,, | Performed by: NURSE PRACTITIONER

## 2021-03-17 PROCEDURE — 3008F BODY MASS INDEX DOCD: CPT | Mod: S$GLB,,, | Performed by: NURSE PRACTITIONER

## 2021-03-17 PROCEDURE — 99213 OFFICE O/P EST LOW 20 MIN: CPT | Mod: S$GLB,,, | Performed by: NURSE PRACTITIONER

## 2021-03-29 ENCOUNTER — PATIENT MESSAGE (OUTPATIENT)
Dept: FAMILY MEDICINE | Facility: CLINIC | Age: 36
End: 2021-03-29

## 2021-03-30 ENCOUNTER — TELEPHONE (OUTPATIENT)
Dept: FAMILY MEDICINE | Facility: CLINIC | Age: 36
End: 2021-03-30

## 2021-03-31 ENCOUNTER — OFFICE VISIT (OUTPATIENT)
Dept: FAMILY MEDICINE | Facility: CLINIC | Age: 36
End: 2021-03-31
Payer: COMMERCIAL

## 2021-03-31 VITALS
HEART RATE: 85 BPM | HEIGHT: 67 IN | OXYGEN SATURATION: 96 % | SYSTOLIC BLOOD PRESSURE: 120 MMHG | BODY MASS INDEX: 45.99 KG/M2 | TEMPERATURE: 98 F | WEIGHT: 293 LBS | DIASTOLIC BLOOD PRESSURE: 90 MMHG

## 2021-03-31 DIAGNOSIS — J00 NASOPHARYNGITIS: Primary | ICD-10-CM

## 2021-03-31 DIAGNOSIS — J02.0 STREP PHARYNGITIS: ICD-10-CM

## 2021-03-31 LAB
CTP QC/QA: YES
S PYO RRNA THROAT QL PROBE: POSITIVE

## 2021-03-31 PROCEDURE — 99213 PR OFFICE/OUTPT VISIT, EST, LEVL III, 20-29 MIN: ICD-10-PCS | Mod: 25,S$GLB,, | Performed by: NURSE PRACTITIONER

## 2021-03-31 PROCEDURE — 87880 STREP A ASSAY W/OPTIC: CPT | Mod: QW,S$GLB,, | Performed by: NURSE PRACTITIONER

## 2021-03-31 PROCEDURE — 3008F BODY MASS INDEX DOCD: CPT | Mod: S$GLB,,, | Performed by: NURSE PRACTITIONER

## 2021-03-31 PROCEDURE — 1126F AMNT PAIN NOTED NONE PRSNT: CPT | Mod: S$GLB,,, | Performed by: NURSE PRACTITIONER

## 2021-03-31 PROCEDURE — 99213 OFFICE O/P EST LOW 20 MIN: CPT | Mod: 25,S$GLB,, | Performed by: NURSE PRACTITIONER

## 2021-03-31 PROCEDURE — 1126F PR PAIN SEVERITY QUANTIFIED, NO PAIN PRESENT: ICD-10-PCS | Mod: S$GLB,,, | Performed by: NURSE PRACTITIONER

## 2021-03-31 PROCEDURE — 96372 PR INJECTION,THERAP/PROPH/DIAG2ST, IM OR SUBCUT: ICD-10-PCS | Mod: JG,S$GLB,, | Performed by: NURSE PRACTITIONER

## 2021-03-31 PROCEDURE — 87880 POCT RAPID STREP A: ICD-10-PCS | Mod: QW,S$GLB,, | Performed by: NURSE PRACTITIONER

## 2021-03-31 PROCEDURE — 3008F PR BODY MASS INDEX (BMI) DOCUMENTED: ICD-10-PCS | Mod: S$GLB,,, | Performed by: NURSE PRACTITIONER

## 2021-03-31 PROCEDURE — 96372 THER/PROPH/DIAG INJ SC/IM: CPT | Mod: JG,S$GLB,, | Performed by: NURSE PRACTITIONER

## 2021-03-31 RX ORDER — INSULIN HUMAN 500 [IU]/ML
INJECTION, SOLUTION SUBCUTANEOUS
COMMUNITY
Start: 2021-03-17 | End: 2023-10-25 | Stop reason: SDUPTHER

## 2021-04-23 ENCOUNTER — TELEPHONE (OUTPATIENT)
Dept: FAMILY MEDICINE | Facility: CLINIC | Age: 36
End: 2021-04-23

## 2021-04-29 ENCOUNTER — PATIENT MESSAGE (OUTPATIENT)
Dept: RESEARCH | Facility: HOSPITAL | Age: 36
End: 2021-04-29

## 2021-09-21 ENCOUNTER — PATIENT MESSAGE (OUTPATIENT)
Dept: FAMILY MEDICINE | Facility: CLINIC | Age: 36
End: 2021-09-21

## 2021-09-22 ENCOUNTER — OFFICE VISIT (OUTPATIENT)
Dept: FAMILY MEDICINE | Facility: CLINIC | Age: 36
End: 2021-09-22
Payer: COMMERCIAL

## 2021-09-22 VITALS
HEART RATE: 65 BPM | WEIGHT: 293 LBS | DIASTOLIC BLOOD PRESSURE: 78 MMHG | BODY MASS INDEX: 45.99 KG/M2 | HEIGHT: 67 IN | SYSTOLIC BLOOD PRESSURE: 110 MMHG | OXYGEN SATURATION: 97 % | TEMPERATURE: 98 F

## 2021-09-22 DIAGNOSIS — R50.9 FEVER, UNSPECIFIED FEVER CAUSE: Primary | ICD-10-CM

## 2021-09-22 LAB
CTP QC/QA: YES
SARS-COV-2 RDRP RESP QL NAA+PROBE: NEGATIVE

## 2021-09-22 PROCEDURE — 3008F PR BODY MASS INDEX (BMI) DOCUMENTED: ICD-10-PCS | Mod: S$GLB,,, | Performed by: NURSE PRACTITIONER

## 2021-09-22 PROCEDURE — 99213 OFFICE O/P EST LOW 20 MIN: CPT | Mod: S$GLB,,, | Performed by: NURSE PRACTITIONER

## 2021-09-22 PROCEDURE — U0002: ICD-10-PCS | Mod: QW,S$GLB,, | Performed by: NURSE PRACTITIONER

## 2021-09-22 PROCEDURE — U0002 COVID-19 LAB TEST NON-CDC: HCPCS | Mod: QW,S$GLB,, | Performed by: NURSE PRACTITIONER

## 2021-09-22 PROCEDURE — 1160F PR REVIEW ALL MEDS BY PRESCRIBER/CLIN PHARMACIST DOCUMENTED: ICD-10-PCS | Mod: S$GLB,,, | Performed by: NURSE PRACTITIONER

## 2021-09-22 PROCEDURE — 99213 PR OFFICE/OUTPT VISIT, EST, LEVL III, 20-29 MIN: ICD-10-PCS | Mod: S$GLB,,, | Performed by: NURSE PRACTITIONER

## 2021-09-22 PROCEDURE — 1160F RVW MEDS BY RX/DR IN RCRD: CPT | Mod: S$GLB,,, | Performed by: NURSE PRACTITIONER

## 2021-09-22 PROCEDURE — 3008F BODY MASS INDEX DOCD: CPT | Mod: S$GLB,,, | Performed by: NURSE PRACTITIONER

## 2021-09-22 RX ORDER — ATORVASTATIN CALCIUM 40 MG/1
20 TABLET, FILM COATED ORAL NIGHTLY
COMMUNITY
Start: 2021-06-20 | End: 2023-10-25

## 2021-10-06 ENCOUNTER — PATIENT MESSAGE (OUTPATIENT)
Dept: FAMILY MEDICINE | Facility: CLINIC | Age: 36
End: 2021-10-06

## 2021-10-20 ENCOUNTER — OFFICE VISIT (OUTPATIENT)
Dept: FAMILY MEDICINE | Facility: CLINIC | Age: 36
End: 2021-10-20
Payer: COMMERCIAL

## 2021-10-20 VITALS
HEART RATE: 67 BPM | TEMPERATURE: 99 F | WEIGHT: 293 LBS | SYSTOLIC BLOOD PRESSURE: 130 MMHG | OXYGEN SATURATION: 97 % | DIASTOLIC BLOOD PRESSURE: 86 MMHG | BODY MASS INDEX: 45.99 KG/M2 | HEIGHT: 67 IN

## 2021-10-20 DIAGNOSIS — F41.8 ANXIETY WITH DEPRESSION: Primary | ICD-10-CM

## 2021-10-20 DIAGNOSIS — E11.9 TYPE 2 DIABETES MELLITUS WITHOUT COMPLICATION, WITH LONG-TERM CURRENT USE OF INSULIN: ICD-10-CM

## 2021-10-20 DIAGNOSIS — Z79.4 TYPE 2 DIABETES MELLITUS WITHOUT COMPLICATION, WITH LONG-TERM CURRENT USE OF INSULIN: ICD-10-CM

## 2021-10-20 DIAGNOSIS — M72.2 PLANTAR FASCIITIS OF LEFT FOOT: ICD-10-CM

## 2021-10-20 PROCEDURE — 3008F PR BODY MASS INDEX (BMI) DOCUMENTED: ICD-10-PCS | Mod: S$GLB,,, | Performed by: NURSE PRACTITIONER

## 2021-10-20 PROCEDURE — 3075F SYST BP GE 130 - 139MM HG: CPT | Mod: S$GLB,,, | Performed by: NURSE PRACTITIONER

## 2021-10-20 PROCEDURE — 1160F PR REVIEW ALL MEDS BY PRESCRIBER/CLIN PHARMACIST DOCUMENTED: ICD-10-PCS | Mod: S$GLB,,, | Performed by: NURSE PRACTITIONER

## 2021-10-20 PROCEDURE — 3075F PR MOST RECENT SYSTOLIC BLOOD PRESS GE 130-139MM HG: ICD-10-PCS | Mod: S$GLB,,, | Performed by: NURSE PRACTITIONER

## 2021-10-20 PROCEDURE — 99213 OFFICE O/P EST LOW 20 MIN: CPT | Mod: S$GLB,,, | Performed by: NURSE PRACTITIONER

## 2021-10-20 PROCEDURE — 3079F PR MOST RECENT DIASTOLIC BLOOD PRESSURE 80-89 MM HG: ICD-10-PCS | Mod: S$GLB,,, | Performed by: NURSE PRACTITIONER

## 2021-10-20 PROCEDURE — 1160F RVW MEDS BY RX/DR IN RCRD: CPT | Mod: S$GLB,,, | Performed by: NURSE PRACTITIONER

## 2021-10-20 PROCEDURE — 3008F BODY MASS INDEX DOCD: CPT | Mod: S$GLB,,, | Performed by: NURSE PRACTITIONER

## 2021-10-20 PROCEDURE — 3079F DIAST BP 80-89 MM HG: CPT | Mod: S$GLB,,, | Performed by: NURSE PRACTITIONER

## 2021-10-20 PROCEDURE — 99213 PR OFFICE/OUTPT VISIT, EST, LEVL III, 20-29 MIN: ICD-10-PCS | Mod: S$GLB,,, | Performed by: NURSE PRACTITIONER

## 2021-10-20 RX ORDER — BUPROPION HYDROCHLORIDE 150 MG/1
150 TABLET ORAL DAILY
Qty: 30 TABLET | Refills: 5 | Status: SHIPPED | OUTPATIENT
Start: 2021-10-20 | End: 2022-06-30

## 2021-10-26 DIAGNOSIS — E04.2 NONTOXIC MULTINODULAR GOITER: Primary | ICD-10-CM

## 2021-11-01 ENCOUNTER — TELEPHONE (OUTPATIENT)
Dept: INFECTIOUS DISEASES | Facility: CLINIC | Age: 36
End: 2021-11-01
Payer: COMMERCIAL

## 2021-11-02 ENCOUNTER — HOSPITAL ENCOUNTER (OUTPATIENT)
Dept: RADIOLOGY | Facility: HOSPITAL | Age: 36
Discharge: HOME | End: 2021-11-02
Attending: DERMATOLOGY
Payer: COMMERCIAL

## 2021-11-02 ENCOUNTER — HOSPITAL ENCOUNTER (OUTPATIENT)
Dept: RADIOLOGY | Facility: HOSPITAL | Age: 36
Discharge: HOME | End: 2021-11-02
Attending: INTERNAL MEDICINE
Payer: COMMERCIAL

## 2021-11-02 DIAGNOSIS — E04.2 NONTOXIC MULTINODULAR GOITER: ICD-10-CM

## 2021-11-02 PROCEDURE — 71046 X-RAY EXAM CHEST 2 VIEWS: CPT | Mod: TC,PO

## 2021-11-02 PROCEDURE — 76536 US EXAM OF HEAD AND NECK: CPT | Mod: TC,PO

## 2021-12-03 ENCOUNTER — TELEPHONE (OUTPATIENT)
Dept: FAMILY MEDICINE | Facility: CLINIC | Age: 36
End: 2021-12-03
Payer: COMMERCIAL

## 2021-12-03 ENCOUNTER — OFFICE VISIT (OUTPATIENT)
Dept: FAMILY MEDICINE | Facility: CLINIC | Age: 36
End: 2021-12-03
Payer: COMMERCIAL

## 2021-12-03 ENCOUNTER — CLINICAL SUPPORT (OUTPATIENT)
Dept: FAMILY MEDICINE | Facility: CLINIC | Age: 36
End: 2021-12-03
Payer: COMMERCIAL

## 2021-12-03 ENCOUNTER — TELEPHONE (OUTPATIENT)
Dept: FAMILY MEDICINE | Facility: CLINIC | Age: 36
End: 2021-12-03

## 2021-12-03 DIAGNOSIS — R05.9 COUGH: Primary | ICD-10-CM

## 2021-12-03 DIAGNOSIS — J40 BRONCHITIS: Primary | ICD-10-CM

## 2021-12-03 DIAGNOSIS — R05.9 COUGH: ICD-10-CM

## 2021-12-03 LAB
CTP QC/QA: YES
CTP QC/QA: YES
POC MOLECULAR INFLUENZA A AGN: NEGATIVE
POC MOLECULAR INFLUENZA B AGN: NEGATIVE
SARS-COV-2 RDRP RESP QL NAA+PROBE: NEGATIVE

## 2021-12-03 PROCEDURE — 87502 INFLUENZA DNA AMP PROBE: CPT | Mod: QW,S$GLB,, | Performed by: FAMILY MEDICINE

## 2021-12-03 PROCEDURE — 99213 PR OFFICE/OUTPT VISIT, EST, LEVL III, 20-29 MIN: ICD-10-PCS | Mod: 95,,, | Performed by: FAMILY MEDICINE

## 2021-12-03 PROCEDURE — U0005 INFEC AGEN DETEC AMPLI PROBE: HCPCS | Performed by: FAMILY MEDICINE

## 2021-12-03 PROCEDURE — U0002: ICD-10-PCS | Mod: QW,S$GLB,, | Performed by: FAMILY MEDICINE

## 2021-12-03 PROCEDURE — U0003 INFECTIOUS AGENT DETECTION BY NUCLEIC ACID (DNA OR RNA); SEVERE ACUTE RESPIRATORY SYNDROME CORONAVIRUS 2 (SARS-COV-2) (CORONAVIRUS DISEASE [COVID-19]), AMPLIFIED PROBE TECHNIQUE, MAKING USE OF HIGH THROUGHPUT TECHNOLOGIES AS DESCRIBED BY CMS-2020-01-R: HCPCS | Performed by: FAMILY MEDICINE

## 2021-12-03 PROCEDURE — U0002 COVID-19 LAB TEST NON-CDC: HCPCS | Mod: QW,S$GLB,, | Performed by: FAMILY MEDICINE

## 2021-12-03 PROCEDURE — 99213 OFFICE O/P EST LOW 20 MIN: CPT | Mod: 95,,, | Performed by: FAMILY MEDICINE

## 2021-12-03 PROCEDURE — 87502 POCT INFLUENZA A/B MOLECULAR: ICD-10-PCS | Mod: QW,S$GLB,, | Performed by: FAMILY MEDICINE

## 2021-12-03 RX ORDER — ALBUTEROL SULFATE 90 UG/1
2 AEROSOL, METERED RESPIRATORY (INHALATION) EVERY 6 HOURS PRN
Qty: 18 G | Refills: 0 | Status: SHIPPED | OUTPATIENT
Start: 2021-12-03 | End: 2022-11-09 | Stop reason: SDUPTHER

## 2021-12-03 RX ORDER — PROMETHAZINE HYDROCHLORIDE AND DEXTROMETHORPHAN HYDROBROMIDE 6.25; 15 MG/5ML; MG/5ML
5 SYRUP ORAL EVERY 4 HOURS PRN
Qty: 240 ML | Refills: 0 | Status: SHIPPED | OUTPATIENT
Start: 2021-12-03 | End: 2021-12-13

## 2021-12-03 RX ORDER — AZITHROMYCIN 250 MG/1
TABLET, FILM COATED ORAL
Qty: 6 TABLET | Refills: 0 | Status: SHIPPED | OUTPATIENT
Start: 2021-12-03 | End: 2021-12-08

## 2021-12-04 LAB
SARS-COV-2 RNA RESP QL NAA+PROBE: NOT DETECTED
SARS-COV-2- CYCLE NUMBER: NORMAL

## 2021-12-12 ENCOUNTER — PATIENT MESSAGE (OUTPATIENT)
Dept: FAMILY MEDICINE | Facility: CLINIC | Age: 36
End: 2021-12-12
Payer: COMMERCIAL

## 2021-12-12 DIAGNOSIS — R05.9 COUGH: Primary | ICD-10-CM

## 2021-12-13 RX ORDER — BENZONATATE 100 MG/1
100 CAPSULE ORAL 3 TIMES DAILY PRN
Qty: 30 CAPSULE | Refills: 0 | OUTPATIENT
Start: 2021-12-13 | End: 2021-12-23

## 2022-01-07 ENCOUNTER — PATIENT MESSAGE (OUTPATIENT)
Dept: FAMILY MEDICINE | Facility: CLINIC | Age: 37
End: 2022-01-07

## 2022-01-08 ENCOUNTER — LAB VISIT (OUTPATIENT)
Dept: FAMILY MEDICINE | Facility: CLINIC | Age: 37
End: 2022-01-08
Payer: COMMERCIAL

## 2022-01-08 DIAGNOSIS — Z11.52 ENCOUNTER FOR SCREENING FOR COVID-19: Primary | ICD-10-CM

## 2022-01-08 PROCEDURE — U0005 INFEC AGEN DETEC AMPLI PROBE: HCPCS | Performed by: FAMILY MEDICINE

## 2022-01-08 PROCEDURE — U0003 INFECTIOUS AGENT DETECTION BY NUCLEIC ACID (DNA OR RNA); SEVERE ACUTE RESPIRATORY SYNDROME CORONAVIRUS 2 (SARS-COV-2) (CORONAVIRUS DISEASE [COVID-19]), AMPLIFIED PROBE TECHNIQUE, MAKING USE OF HIGH THROUGHPUT TECHNOLOGIES AS DESCRIBED BY CMS-2020-01-R: HCPCS | Performed by: FAMILY MEDICINE

## 2022-01-08 NOTE — PROGRESS NOTES
Marguerite Colon presented to clinic for COVID-19 swab.   Marguerite Colon verified x2, name and .   Marguerite Colon instructed on what will be completed, asked if ever had COVID-19 swab.   Explained procedure to Marguerite Colon.   Specimen obtained.   No questions or concerns voiced further at this time.   Marguerite Colon left in satisfactory condition.

## 2022-01-10 ENCOUNTER — OFFICE VISIT (OUTPATIENT)
Dept: FAMILY MEDICINE | Facility: CLINIC | Age: 37
End: 2022-01-10
Payer: COMMERCIAL

## 2022-01-10 DIAGNOSIS — R05.9 COUGH: ICD-10-CM

## 2022-01-10 DIAGNOSIS — J01.10 ACUTE FRONTAL SINUSITIS, RECURRENCE NOT SPECIFIED: ICD-10-CM

## 2022-01-10 DIAGNOSIS — J34.89 RHINORRHEA: ICD-10-CM

## 2022-01-10 DIAGNOSIS — R50.9 FEVER, UNSPECIFIED FEVER CAUSE: Primary | ICD-10-CM

## 2022-01-10 LAB
SARS-COV-2 RNA RESP QL NAA+PROBE: NOT DETECTED
SARS-COV-2- CYCLE NUMBER: NORMAL

## 2022-01-10 PROCEDURE — 99213 OFFICE O/P EST LOW 20 MIN: CPT | Mod: 95,,, | Performed by: FAMILY MEDICINE

## 2022-01-10 PROCEDURE — 99213 PR OFFICE/OUTPT VISIT, EST, LEVL III, 20-29 MIN: ICD-10-PCS | Mod: 95,,, | Performed by: FAMILY MEDICINE

## 2022-01-10 RX ORDER — AZITHROMYCIN 250 MG/1
TABLET, FILM COATED ORAL
Qty: 6 TABLET | Refills: 0 | Status: SHIPPED | OUTPATIENT
Start: 2022-01-10 | End: 2022-01-15

## 2022-01-10 RX ORDER — PROMETHAZINE HYDROCHLORIDE AND DEXTROMETHORPHAN HYDROBROMIDE 6.25; 15 MG/5ML; MG/5ML
5 SYRUP ORAL EVERY 4 HOURS PRN
Qty: 180 ML | Refills: 0 | Status: SHIPPED | OUTPATIENT
Start: 2022-01-10 | End: 2022-01-20

## 2022-01-10 NOTE — LETTER
[address] [place] [address] ? [place], [address] ? Phone [phone] ? Fax             Return to Work/School    Patient: Marguerite Colon  YOB: 1985   Date: 01/10/2022      To Whom It May Concern:     Marguerite Colon was in contact with/seen in my office on 01/10/2022. COVID-19 is present in our communities across the state. Not all patients are eligible or appropriate to be tested. In this situation, your employee meets the following criteria:     Marguerite Colon has met the criteria for COVID-19 testing based upon symptoms, travel, and/or potential exposure. The test has been completed and is pending results at this time. During this time the employee is not able to work and should be quarantined per the Centers for Disease Control timelines.      If you have any questions or concerns, or if I can be of further assistance, please do not hesitate to contact me.     Sincerely,      Gisele Christianson NP

## 2022-01-10 NOTE — PROGRESS NOTES
Subjective:       Patient ID: Marguerite Colon is a 36 y.o. female.  The patient location is: [place]  The chief complaint leading to consultation is: COVID concerns, sinus congestion, cough, fever, runny nose    Visit type: audiovisual    Face to Face time with patient: 10  15 minutes of total time spent on the encounter, which includes face to face time and non-face to face time preparing to see the patient (eg, review of tests), Obtaining and/or reviewing separately obtained history, Documenting clinical information in the electronic or other health record, Independently interpreting results (not separately reported) and communicating results to the patient/family/caregiver, or Care coordination (not separately reported).         Each patient to whom he or she provides medical services by telemedicine is:  (1) informed of the relationship between the physician and patient and the respective role of any other health care provider with respect to management of the patient; and (2) notified that he or she may decline to receive medical services by telemedicine and may withdraw from such care at any time.      Chief Complaint: COVID-19 Concerns, Nasal Congestion, Fever, and Sinusitis    This patient is new to me.  Marguerite Colon presents on virtual visit today with complaints of runny nose, post nasal drip, productive cough of yellowish green sputum, and fever. Patient states she had fever on January 1 and it has not returned. Patient reports she went to [facility] and got COVID swabbed on 1/8/22 but does not have a result as of yet. Patient reports she had a sinus infection last month and is requesting another z-miranda and some more cough medication. Patient is taking over the counter therapies for other symptoms.   Patient educated on plan of care, verbalized understanding.     Cough  This is a new problem. The current episode started in the past 7 days. The problem has been gradually worsening. The problem occurs every few  minutes. The cough is productive of purulent sputum. Associated symptoms include chest pain, nasal congestion, postnasal drip, rhinorrhea and wheezing. Pertinent negatives include no chills, ear congestion, ear pain, fever, headaches, heartburn, hemoptysis, myalgias, rash, sore throat, shortness of breath, sweats or weight loss. The symptoms are aggravated by lying down. Risk factors for lung disease include animal exposure. She has tried OTC cough suppressant, a beta-agonist inhaler and prescription cough suppressant for the symptoms. The treatment provided no relief. There is no history of asthma, bronchiectasis, bronchitis, COPD, emphysema, environmental allergies or pneumonia.     Review of Systems   Constitutional: Negative for chills, fever and weight loss.   HENT: Positive for postnasal drip and rhinorrhea. Negative for ear pain and sore throat.    Respiratory: Positive for cough and wheezing. Negative for hemoptysis and shortness of breath.    Cardiovascular: Positive for chest pain.   Gastrointestinal: Negative for heartburn.   Musculoskeletal: Negative for myalgias.   Skin: Negative for rash.   Allergic/Immunologic: Negative for environmental allergies.   Neurological: Negative for headaches.       Patient Active Problem List   Diagnosis    Ovarian cancer    Type II or unspecified type diabetes mellitus without mention of complication, uncontrolled    Morbid obesity    Family history of diabetes mellitus    Myalgia    Knee pain, bilateral    Hepatomegaly    Cigarette smoker    Small bowel obstruction due to adhesions    Obesity, Class III, BMI 40-49.9 (morbid obesity)    Bilateral headaches    Gait abnormality    Posture abnormality    Hx of ovarian cancer    Hypoactive thyroid    Thyroid nodule    Reactive airway disease    Obstructive sleep apnea syndrome in adult    Mixed hyperlipidemia       Objective:      Physical Exam  Constitutional:       General: She is not in acute distress.      Appearance: Normal appearance. She is obese. She is ill-appearing.   HENT:      Head: Normocephalic.   Eyes:      Conjunctiva/sclera: Conjunctivae normal.      Pupils: Pupils are equal, round, and reactive to light.      Comments: As seen on virtual visit   Pulmonary:      Effort: Pulmonary effort is normal. No respiratory distress.   Musculoskeletal:      Cervical back: Normal range of motion and neck supple.   Skin:     General: Skin is warm and dry.   Neurological:      General: No focal deficit present.      Mental Status: She is alert and oriented to person, place, and time.   Psychiatric:         Mood and Affect: Mood normal.         Behavior: Behavior normal.         Lab Results   Component Value Date    WBC 8.7 03/12/2021    HGB 13.2 03/12/2021    HCT 40.7 03/12/2021     03/12/2021    ALT 17 10/22/2021    AST 17 10/22/2021     10/22/2021    K 4.0 10/22/2021     10/22/2021    CREATININE 0.68 10/22/2021    BUN 18 10/22/2021    CO2 27 10/22/2021    TSH 0.366 (L) 04/09/2016    INR 1.0 07/06/2016    HGBA1C 9.2 (H) 02/26/2013     The ASCVD Risk score ([place] MATTHEW Jr., et al., 2013) failed to calculate for the following reasons:    The 2013 ASCVD risk score is only valid for ages 40 to 79    Assessment:       1. Fever, unspecified fever cause    2. Cough    3. Rhinorrhea    4. Acute frontal sinusitis, recurrence not specified        Plan:       Marguerite AYALA was seen today for covid-19 concerns, nasal congestion, fever and sinusitis.    Diagnoses and all orders for this visit:    Fever, unspecified fever cause / Rhinorrhea / Acute frontal sinusitis, recurrence not specified / Cough  -     azithromycin (Z-MARY) 250 MG tablet; Take 2 tablets by mouth on day 1; Take 1 tablet by mouth on days 2-5  -     promethazine-dextromethorphan (PROMETHAZINE-DM) 6.25-15 mg/5 mL Syrp; Take 5 mLs by mouth every 4 (four) hours as needed (cough).      Follow up if symptoms worsen or fail to improve.

## 2022-01-10 NOTE — LETTER
[address] [place] [address]  [place] [address]  Phone: [phone]          Return to Work/School    Patient: Marguerite Colon  YOB: 1985   Date: 01/12/2022     To Whom It May Concern:     Marguerite Colon was in contact with/seen in my office on 01/10/2022. COVID-19 is present in our communities across the state. There is limited testing for COVID at this time, so not all patients can be tested. In this situation, your employee meets the following criteria:     Marguerite Colon has met the criteria for COVID-19 testing and has a NEGATIVE result. The employee can return to work once they are asymptomatic for 24 hours without the use of fever reducing medications (Tylenol, Motrin, etc).     If you have any questions or concerns, or if I can be of further assistance, please do not hesitate to contact me.     Sincerely,     Gisele Christianson NP

## 2022-01-11 ENCOUNTER — PATIENT MESSAGE (OUTPATIENT)
Dept: FAMILY MEDICINE | Facility: CLINIC | Age: 37
End: 2022-01-11

## 2022-01-11 NOTE — LETTER
[address] [place] [address]  [place] [address]  Phone: [phone]          Return to Work/School    Patient: Marguerite Colon  YOB: 1985   Date: 01/12/2022     To Whom It May Concern:     Marguerite Colon was in contact with/seen in my office on 01/12/2022. COVID-19 is present in our communities across the state. There is limited testing for COVID at this time, so not all patients can be tested. In this situation, your employee meets the following criteria:     Marguerite Colon has met the criteria for COVID-19 testing and has a NEGATIVE result. The employee can return to work once they are afebrile for 24 hours without the use of fever reducing medications (Tylenol, Motrin, etc).  The employee can return to work with cough, treated with cough medication.      If you have any questions or concerns, or if I can be of further assistance, please do not hesitate to contact me.     Sincerely,     Gisele Christianson NP

## 2022-01-12 ENCOUNTER — TELEPHONE (OUTPATIENT)
Dept: FAMILY MEDICINE | Facility: CLINIC | Age: 37
End: 2022-01-12

## 2022-01-12 NOTE — TELEPHONE ENCOUNTER
Spoke w/ patient to notify her that her return to work note has been updated and sent through the portal by FEDERICA Christianson, patient confirmed getting the updated return to work note.

## 2022-01-12 NOTE — TELEPHONE ENCOUNTER
"Per the patients HR department the return to work note "I have to be asymptomatic and I still have a cough the note will need to say that I may return to work with a cough and it is being treated with medication     "

## 2022-01-12 NOTE — TELEPHONE ENCOUNTER
----- Message from Dante Mendoza sent at 1/12/2022  2:26 PM CST -----  Contact: pt  Type: Needs Medical Advice    Who Called:pt  Best Call Back Number:[phone]    Additional Information: Requesting callback regarding needing a callback for a note to return to work please call back pt     Please Advise-Thank you

## 2022-04-06 DIAGNOSIS — E04.1 CYST OF THYROID: Primary | ICD-10-CM

## 2022-05-30 ENCOUNTER — OFFICE VISIT (OUTPATIENT)
Dept: DERMATOLOGY | Facility: CLINIC | Age: 37
End: 2022-05-30
Payer: COMMERCIAL

## 2022-05-30 DIAGNOSIS — L40.9 PSORIASIS: ICD-10-CM

## 2022-05-30 DIAGNOSIS — R76.12 POSITIVE QUANTIFERON-TB GOLD TEST: Primary | ICD-10-CM

## 2022-05-30 PROCEDURE — 11900 PR INJECTION INTO SKIN LESIONS, UP TO 7: ICD-10-PCS | Mod: S$GLB,,, | Performed by: STUDENT IN AN ORGANIZED HEALTH CARE EDUCATION/TRAINING PROGRAM

## 2022-05-30 PROCEDURE — 11900 INJECT SKIN LESIONS </W 7: CPT | Mod: S$GLB,,, | Performed by: STUDENT IN AN ORGANIZED HEALTH CARE EDUCATION/TRAINING PROGRAM

## 2022-05-30 PROCEDURE — 1159F MED LIST DOCD IN RCRD: CPT | Mod: CPTII,S$GLB,, | Performed by: STUDENT IN AN ORGANIZED HEALTH CARE EDUCATION/TRAINING PROGRAM

## 2022-05-30 PROCEDURE — 1160F PR REVIEW ALL MEDS BY PRESCRIBER/CLIN PHARMACIST DOCUMENTED: ICD-10-PCS | Mod: CPTII,S$GLB,, | Performed by: STUDENT IN AN ORGANIZED HEALTH CARE EDUCATION/TRAINING PROGRAM

## 2022-05-30 PROCEDURE — 99204 OFFICE O/P NEW MOD 45 MIN: CPT | Mod: 25,S$GLB,, | Performed by: STUDENT IN AN ORGANIZED HEALTH CARE EDUCATION/TRAINING PROGRAM

## 2022-05-30 PROCEDURE — 1160F RVW MEDS BY RX/DR IN RCRD: CPT | Mod: CPTII,S$GLB,, | Performed by: STUDENT IN AN ORGANIZED HEALTH CARE EDUCATION/TRAINING PROGRAM

## 2022-05-30 PROCEDURE — 99999 PR PBB SHADOW E&M-EST. PATIENT-LVL III: ICD-10-PCS | Mod: PBBFAC,,, | Performed by: STUDENT IN AN ORGANIZED HEALTH CARE EDUCATION/TRAINING PROGRAM

## 2022-05-30 PROCEDURE — 99999 PR PBB SHADOW E&M-EST. PATIENT-LVL III: CPT | Mod: PBBFAC,,, | Performed by: STUDENT IN AN ORGANIZED HEALTH CARE EDUCATION/TRAINING PROGRAM

## 2022-05-30 PROCEDURE — 99204 PR OFFICE/OUTPT VISIT, NEW, LEVL IV, 45-59 MIN: ICD-10-PCS | Mod: 25,S$GLB,, | Performed by: STUDENT IN AN ORGANIZED HEALTH CARE EDUCATION/TRAINING PROGRAM

## 2022-05-30 PROCEDURE — 1159F PR MEDICATION LIST DOCUMENTED IN MEDICAL RECORD: ICD-10-PCS | Mod: CPTII,S$GLB,, | Performed by: STUDENT IN AN ORGANIZED HEALTH CARE EDUCATION/TRAINING PROGRAM

## 2022-05-30 RX ORDER — CALCIPOTRIENE, BETAMETHASONE DIPROPIONATE 50; .643 UG/G; MG/G
OINTMENT TOPICAL DAILY
Qty: 60 G | Refills: 1 | Status: SHIPPED | OUTPATIENT
Start: 2022-05-30 | End: 2023-10-25

## 2022-05-30 NOTE — PROGRESS NOTES
Subjective:       Patient ID:  Marguerite Colon is a 37 y.o. female who presents for   Chief Complaint   Patient presents with    Rash     elbows     LOV 10/29/21 [place]    Patient presents today for second opinion on treatment of psoriasis. Present on elbows, forehead, right knee.   It has been injected with ILK in the past.  Previously tried lidex cream with minimal improvement.   Most recently has been using diprolene cream BID with good improvement.   Has hx of lower extremity pain which she attributes to a type of chemotherapy she had in the past. No lower back or hand joint pain.   States she was going to take otzela however she had a positive quantiferon gold test. Has never seen ID for this. Denies fever, night sweats, cough, weight loss.   Hx of depression/anxiety which has improved. Related to going through a divorce. She is now only on zoloft.       Hx of ovarian cancer in 2013- had chemo and complete hysterectomy         Review of Systems   Constitutional: Negative for fever, chills and fatigue.   Respiratory: Negative for cough and shortness of breath.    Gastrointestinal: Negative for nausea and vomiting.   Skin: Positive for itching and rash.        Objective:    Physical Exam   Constitutional: She appears well-developed and well-nourished.   Neurological: She is alert and oriented to person, place, and time.   Psychiatric: She has a normal mood and affect.   Skin:   Areas Examined (abnormalities noted in diagram):   RUE Inspected  LUE Inspection Performed  RLE Inspected  LLE Inspection Performed              Diagram Legend     Erythematous scaling macule/papule c/w actinic keratosis       Vascular papule c/w angioma      Pigmented verrucoid papule/plaque c/w seborrheic keratosis      Yellow umbilicated papule c/w sebaceous hyperplasia      Irregularly shaped tan macule c/w lentigo     1-2 mm smooth white papules consistent with Milia      Movable subcutaneous cyst with punctum c/w epidermal inclusion  cyst      Subcutaneous movable cyst c/w pilar cyst      Firm pink to brown papule c/w dermatofibroma      Pedunculated fleshy papule(s) c/w skin tag(s)      Evenly pigmented macule c/w junctional nevus     Mildly variegated pigmented, slightly irregular-bordered macule c/w mildly atypical nevus      Flesh colored to evenly pigmented papule c/w intradermal nevus       Pink pearly papule/plaque c/w basal cell carcinoma      Erythematous hyperkeratotic cursted plaque c/w SCC      Surgical scar with no sign of skin cancer recurrence      Open and closed comedones      Inflammatory papules and pustules      Verrucoid papule consistent consistent with wart     Erythematous eczematous patches and plaques     Dystrophic onycholytic nail with subungual debris c/w onychomycosis     Umbilicated papule    Erythematous-base heme-crusted tan verrucoid plaque consistent with inflamed seborrheic keratosis     Erythematous Silvery Scaling Plaque c/w Psoriasis     See annotation      Assessment / Plan:        Positive QuantiFERON-TB Gold test  -     Ambulatory referral/consult to Infectious Disease; Future; Expected date: 06/06/2022  Discussed importance of seeing ID for + quant gold    Psoriasis  -     calcipotriene-betamethasone (TACLONEX) ointment; Apply topically once daily.  Dispense: 60 g; Refill: 1  Intralesional Kenalog 5mg/cc (1.0 cc total) injected into 2  lesions on the bilateral elbows today after obtaining verbal consent including risk of surrounding hypopigmentation. Patient tolerated procedure well.    Units: 1  NDC for Kenalog 10mg/cc:  1270-8919-12  Change to taclonex ointment nightly            No follow-ups on file.

## 2022-06-02 ENCOUNTER — OFFICE VISIT (OUTPATIENT)
Dept: INFECTIOUS DISEASES | Facility: CLINIC | Age: 37
End: 2022-06-02
Payer: COMMERCIAL

## 2022-06-02 VITALS
TEMPERATURE: 98 F | BODY MASS INDEX: 45.99 KG/M2 | DIASTOLIC BLOOD PRESSURE: 72 MMHG | HEART RATE: 65 BPM | SYSTOLIC BLOOD PRESSURE: 128 MMHG | OXYGEN SATURATION: 95 % | HEIGHT: 67 IN | WEIGHT: 293 LBS

## 2022-06-02 DIAGNOSIS — R76.12 POSITIVE QUANTIFERON-TB GOLD TEST: ICD-10-CM

## 2022-06-02 PROCEDURE — 99205 OFFICE O/P NEW HI 60 MIN: CPT | Mod: S$GLB,,, | Performed by: INTERNAL MEDICINE

## 2022-06-02 PROCEDURE — 3078F PR MOST RECENT DIASTOLIC BLOOD PRESSURE < 80 MM HG: ICD-10-PCS | Mod: CPTII,S$GLB,, | Performed by: INTERNAL MEDICINE

## 2022-06-02 PROCEDURE — 3074F PR MOST RECENT SYSTOLIC BLOOD PRESSURE < 130 MM HG: ICD-10-PCS | Mod: CPTII,S$GLB,, | Performed by: INTERNAL MEDICINE

## 2022-06-02 PROCEDURE — 3078F DIAST BP <80 MM HG: CPT | Mod: CPTII,S$GLB,, | Performed by: INTERNAL MEDICINE

## 2022-06-02 PROCEDURE — 3074F SYST BP LT 130 MM HG: CPT | Mod: CPTII,S$GLB,, | Performed by: INTERNAL MEDICINE

## 2022-06-02 PROCEDURE — 3008F BODY MASS INDEX DOCD: CPT | Mod: CPTII,S$GLB,, | Performed by: INTERNAL MEDICINE

## 2022-06-02 PROCEDURE — 3008F PR BODY MASS INDEX (BMI) DOCUMENTED: ICD-10-PCS | Mod: CPTII,S$GLB,, | Performed by: INTERNAL MEDICINE

## 2022-06-02 PROCEDURE — 1159F PR MEDICATION LIST DOCUMENTED IN MEDICAL RECORD: ICD-10-PCS | Mod: CPTII,S$GLB,, | Performed by: INTERNAL MEDICINE

## 2022-06-02 PROCEDURE — 99205 PR OFFICE/OUTPT VISIT, NEW, LEVL V, 60-74 MIN: ICD-10-PCS | Mod: S$GLB,,, | Performed by: INTERNAL MEDICINE

## 2022-06-02 PROCEDURE — 1159F MED LIST DOCD IN RCRD: CPT | Mod: CPTII,S$GLB,, | Performed by: INTERNAL MEDICINE

## 2022-06-02 RX ORDER — RIFAMPIN 300 MG/1
600 CAPSULE ORAL DAILY
Qty: 240 CAPSULE | Refills: 0 | Status: SHIPPED | OUTPATIENT
Start: 2022-06-02 | End: 2022-09-30

## 2022-06-02 RX ORDER — RIFAMPIN 300 MG/1
600 CAPSULE ORAL DAILY
Qty: 60 CAPSULE | Refills: 0 | Status: SHIPPED | OUTPATIENT
Start: 2022-06-02 | End: 2022-07-02

## 2022-06-02 NOTE — PROGRESS NOTES
Reason for consult:   Subjective:      Patient ID: Marguerite Colon is a 37 y.o. female.    Chief Complaint:: New Patient for Positive Quantiferon Gold TB test     HPI\, This is a 37-year-old female with past medical history of Obesity , BMI 51, ORI on BiPAP, DLP on statin, DM, Hyperthyroidism, thyroid nodule follows wit Dr Ruiz psoriasis, psoriatic arthritis, tubal ligation, back pain, radiculopathy, anxiety, depression,  is referred to me for positive TB gold    Patient has no signs and symptoms of acute active tuberculosis.  She was never in her lifetime exposed or have any risk factors to have tuberculosis.  She understands risks and benefits of different options.  She decided to try rifampin four 4 months.  She was checked for HIV and chest x-ray about 6 7 months ago and it was negative.  Review of patient's allergies indicates:   Allergen Reactions    Carboplatin      Had to go to ER.     Tuberculin ppd Rash     localized     Past Medical History:   Diagnosis Date    Anxiety     Bilateral headaches 4/1/2016    Cirrhosis     Diabetes mellitus     type 2    Hepatomegaly Aug 2014    Morbidly obese     Obesity, Class III, BMI 40-49.9 (morbid obesity) 4/1/2016    Ovarian cancer 12/2012    grade 1 endometrioid adenoca of the ovary    Pancreatitis     Small bowel obstruction due to adhesions Dec 2015     Thyroid disease     hypothyroid    Thyroid nodule      Past Surgical History:   Procedure Laterality Date    ABDOMINAL ADHESION SURGERY      done at same time with ovarian cystectomy    CHOLECYSTECTOMY  dec 2015    HERNIA REPAIR  12/28/2015    25 x 20 cm Venrtralex ST mesh for repair done at time of [facility] for SBO.     HYSTERECTOMY  2 /25/2013    adeline rso. prior lso. ovarian ca    leep      positive margins    left salpingo oophrectomy  7/10    hemorrhagic cyst    OMENTECTOMY      partial right oophrectomy  12/12    LMP of ovary    [place] REMOVAL OF OVARY/TUBE(S)      x lap  12/28/2015    post open  cholecystectomy 2/21/2015.      Social History     Tobacco Use    Smoking status: Current Every Day Smoker     Packs/day: 0.25     Years: 20.00     Pack years: 5.00     Types: Cigarettes    Smokeless tobacco: Never Used   Substance Use Topics    Alcohol use: No        Hunting:  Fishing:  Pets:---- dog   Exposure to sick contacts:  Exposure to TB:  Travel: --- [place] cruise x 1   Live with mother and sister    Lived with  2010--2021    Family History   Problem Relation Age of Onset    Diabetes Mother     Heart disease Mother     Stroke Mother     Hypertension Mother     Diabetes Father     Ovarian cancer Neg Hx     Uterine cancer Neg Hx     Breast cancer Neg Hx     Colon cancer Neg Hx        Review of Systems   Constitutional: Negative for appetite change, chills, diaphoresis, fatigue, fever and unexpected weight change.   HENT: Negative for congestion, dental problem, ear pain, hearing loss, mouth sores, nosebleeds, postnasal drip, rhinorrhea, sinus pain, sore throat and trouble swallowing.    Eyes: Negative for photophobia, pain and visual disturbance.   Respiratory: Negative for cough, choking, chest tightness and shortness of breath.    Cardiovascular: Negative for chest pain, palpitations and leg swelling.   Gastrointestinal: Negative for abdominal pain, anal bleeding, blood in stool, constipation, diarrhea, nausea, rectal pain and vomiting.   Endocrine: Negative for polydipsia and polyuria.   Genitourinary: Negative for difficulty urinating, dysuria, flank pain, frequency, genital sores, hematuria, urgency and vaginal discharge.   Musculoskeletal: Negative for arthralgias, back pain, joint swelling and myalgias.   Skin: Negative for rash.   Allergic/Immunologic: Negative for environmental allergies, food allergies and immunocompromised state.   Neurological: Negative for dizziness, syncope, speech difficulty, weakness, numbness and headaches.   Hematological: Negative for adenopathy. Does not  "bruise/bleed easily.   Psychiatric/Behavioral: Negative for dysphoric mood and sleep disturbance. The patient is not nervous/anxious.         Pertinent medications noted:     Objective:      Blood pressure 128/72, pulse 65, temperature 98 °F (36.7 °C), height 5' 7" (1.702 m), weight (!) 149.2 kg (329 lb), last menstrual period 02/11/2013, SpO2 95 %.  Body mass index is 51.53 kg/m².  Physical Exam  Constitutional:       General: She is not in acute distress.     Appearance: She is not diaphoretic.   HENT:      Head: Atraumatic.      Right Ear: External ear normal.      Left Ear: External ear normal.      Nose: Nose normal.   Eyes:      General: No scleral icterus.     Conjunctiva/sclera: Conjunctivae normal.      Pupils: Pupils are equal, round, and reactive to light.   Neck:      Vascular: No JVD.   Cardiovascular:      Rate and Rhythm: Normal rate and regular rhythm.      Heart sounds: Normal heart sounds.   Pulmonary:      Effort: Pulmonary effort is normal.      Breath sounds: Normal breath sounds. No wheezing or rales.   Chest:      Chest wall: No tenderness.   Abdominal:      General: Bowel sounds are normal. There is no distension.      Palpations: Abdomen is soft. There is no mass.      Tenderness: There is no abdominal tenderness. There is no guarding or rebound.   Musculoskeletal:         General: Normal range of motion.      Cervical back: Normal range of motion and neck supple.   Lymphadenopathy:      Cervical: No cervical adenopathy.   Skin:     General: Skin is warm and dry.      Findings: No erythema or rash.   Neurological:      Mental Status: She is alert and oriented to person, place, and time.      Cranial Nerves: No cranial nerve deficit.   Psychiatric:         Behavior: Behavior normal.         Thought Content: Thought content normal.         VAD:     General Labs reviewed:  Lab Results   Component Value Date    WBC 8.7 03/12/2021    RBC 4.65 03/12/2021    HGB 13.2 03/12/2021    HCT 40.7 " 03/12/2021    MCV 87.5 03/12/2021    MCH 28.4 03/12/2021    MCHC 32.4 03/12/2021    RDW 13.7 03/12/2021     03/12/2021    MPV 11.1 03/12/2021    GRAN 6.4 07/06/2016    GRAN 62.3 07/06/2016    LYMPH 3,454 03/12/2021    LYMPH 39.7 03/12/2021    MONO 548 03/12/2021    MONO 6.3 03/12/2021     03/12/2021    BASO 87 03/12/2021    EOSINOPHIL 3.4 03/12/2021    BASOPHIL 1.0 03/12/2021     CMP  Sodium   Date Value Ref Range Status   10/22/2021 140 135 - 146 mmol/L Final     Potassium   Date Value Ref Range Status   10/22/2021 4.0 3.5 - 5.3 mmol/L Final     Chloride   Date Value Ref Range Status   10/22/2021 102 98 - 110 mmol/L Final     CO2   Date Value Ref Range Status   10/22/2021 27 20 - 32 mmol/L Final     Glucose   Date Value Ref Range Status   10/22/2021 96 65 - 99 mg/dL Final     Comment:                   Fasting reference interval          BUN   Date Value Ref Range Status   10/22/2021 18 7 - 25 mg/dL Final     Creatinine   Date Value Ref Range Status   10/22/2021 0.68 0.50 - 1.10 mg/dL Final   04/24/2013 0.8 0.5 - 1.4 mg/dL Final     Calcium   Date Value Ref Range Status   10/22/2021 9.2 8.6 - 10.2 mg/dL Final   04/24/2013 10.1 8.7 - 10.5 mg/dL Final     Total Protein   Date Value Ref Range Status   10/22/2021 7.0 6.1 - 8.1 g/dL Final     Albumin   Date Value Ref Range Status   10/22/2021 3.8 3.6 - 5.1 g/dL Final     Total Bilirubin   Date Value Ref Range Status   10/22/2021 0.6 0.2 - 1.2 mg/dL Final     Alkaline Phosphatase   Date Value Ref Range Status   04/09/2016 105 55 - 135 U/L Final     AST   Date Value Ref Range Status   10/22/2021 17 10 - 30 U/L Final     ALT   Date Value Ref Range Status   10/22/2021 17 6 - 29 U/L Final     Anion Gap   Date Value Ref Range Status   04/09/2016 11 8 - 16 mmol/L Final   04/24/2013 13 5 - 15 meq/L Final     eGFR if    Date Value Ref Range Status   10/22/2021 129 > OR = 60 mL/min/1.73m2 Final     eGFR if non    Date Value Ref Range  Status   10/22/2021 111 > OR = 60 mL/min/1.73m2 Final       Micro:  Microbiology Results (last 7 days)     ** No results found for the last 168 hours. **        Imaging Reviewed:    Assessment:       1. Positive QuantiFERON-TB Gold test           Plan:       Positive QuantiFERON-TB Gold test  -     Ambulatory referral/consult to Infectious Disease  -     HIV 1/2 Ag/Ab (4th Gen) w/Refl; Future; Expected date: 06/02/2022  -     Hepatitis B Core Antibody, Total; Future; Expected date: 06/02/2022  -     Hepatitis B Surface Ab, Qualitative; Future; Expected date: 06/02/2022  -     Hepatitis B Surface Antigen; Future; Expected date: 06/02/2022  -     Comprehensive Metabolic Panel; Standing  -     CBC auto differential; Standing  -     rifAMpin (RIFADIN) 300 MG capsule; Take 2 capsules (600 mg total) by mouth once daily.  Dispense: 240 capsule; Refill: 0  -     X-Ray Chest PA And Lateral; Future; Expected date: 06/02/2022  -     rifAMpin (RIFADIN) 300 MG capsule; Take 2 capsules (600 mg total) by mouth once daily.  Dispense: 60 capsule; Refill: 0      Follow up in about 2 weeks (around 6/16/2022).            This note was created using Dragon voice recognition software that occasionally misinterpreted phrases or words.

## 2022-06-06 NOTE — PROGRESS NOTES
I spoke with patient at 3:28 pm today to remind her to have CXR and Labs done by tomorrow ;  Per Dr Carey's orders     06/06/22

## 2022-06-07 ENCOUNTER — HOSPITAL ENCOUNTER (OUTPATIENT)
Dept: RADIOLOGY | Facility: HOSPITAL | Age: 37
Discharge: HOME | End: 2022-06-07
Attending: INTERNAL MEDICINE
Payer: COMMERCIAL

## 2022-06-07 DIAGNOSIS — R76.12 POSITIVE QUANTIFERON-TB GOLD TEST: ICD-10-CM

## 2022-06-07 PROCEDURE — 71046 X-RAY EXAM CHEST 2 VIEWS: CPT | Mod: TC,PO

## 2022-06-14 ENCOUNTER — PATIENT MESSAGE (OUTPATIENT)
Dept: INFECTIOUS DISEASES | Facility: CLINIC | Age: 37
End: 2022-06-14

## 2022-06-30 ENCOUNTER — OFFICE VISIT (OUTPATIENT)
Dept: INFECTIOUS DISEASES | Facility: CLINIC | Age: 37
End: 2022-06-30
Payer: COMMERCIAL

## 2022-06-30 VITALS
TEMPERATURE: 98 F | BODY MASS INDEX: 45.99 KG/M2 | SYSTOLIC BLOOD PRESSURE: 118 MMHG | HEART RATE: 72 BPM | OXYGEN SATURATION: 97 % | DIASTOLIC BLOOD PRESSURE: 64 MMHG | HEIGHT: 67 IN | WEIGHT: 293 LBS

## 2022-06-30 DIAGNOSIS — R76.12 POSITIVE QUANTIFERON-TB GOLD TEST: Primary | ICD-10-CM

## 2022-06-30 DIAGNOSIS — E66.01 MORBID OBESITY: ICD-10-CM

## 2022-06-30 DIAGNOSIS — Z85.43 HX OF OVARIAN CANCER: ICD-10-CM

## 2022-06-30 DIAGNOSIS — K74.00 FIBROSIS OF LIVER: ICD-10-CM

## 2022-06-30 DIAGNOSIS — E88.01 HETEROZYGOUS ALPHA 1-ANTITRYPSIN DEFICIENCY: ICD-10-CM

## 2022-06-30 PROCEDURE — 3008F PR BODY MASS INDEX (BMI) DOCUMENTED: ICD-10-PCS | Mod: CPTII,S$GLB,, | Performed by: INTERNAL MEDICINE

## 2022-06-30 PROCEDURE — 3078F PR MOST RECENT DIASTOLIC BLOOD PRESSURE < 80 MM HG: ICD-10-PCS | Mod: CPTII,S$GLB,, | Performed by: INTERNAL MEDICINE

## 2022-06-30 PROCEDURE — 3078F DIAST BP <80 MM HG: CPT | Mod: CPTII,S$GLB,, | Performed by: INTERNAL MEDICINE

## 2022-06-30 PROCEDURE — 1159F PR MEDICATION LIST DOCUMENTED IN MEDICAL RECORD: ICD-10-PCS | Mod: CPTII,S$GLB,, | Performed by: INTERNAL MEDICINE

## 2022-06-30 PROCEDURE — 3074F SYST BP LT 130 MM HG: CPT | Mod: CPTII,S$GLB,, | Performed by: INTERNAL MEDICINE

## 2022-06-30 PROCEDURE — 1160F RVW MEDS BY RX/DR IN RCRD: CPT | Mod: CPTII,S$GLB,, | Performed by: INTERNAL MEDICINE

## 2022-06-30 PROCEDURE — 99215 PR OFFICE/OUTPT VISIT, EST, LEVL V, 40-54 MIN: ICD-10-PCS | Mod: S$GLB,,, | Performed by: INTERNAL MEDICINE

## 2022-06-30 PROCEDURE — 99215 OFFICE O/P EST HI 40 MIN: CPT | Mod: S$GLB,,, | Performed by: INTERNAL MEDICINE

## 2022-06-30 PROCEDURE — 3008F BODY MASS INDEX DOCD: CPT | Mod: CPTII,S$GLB,, | Performed by: INTERNAL MEDICINE

## 2022-06-30 PROCEDURE — 3074F PR MOST RECENT SYSTOLIC BLOOD PRESSURE < 130 MM HG: ICD-10-PCS | Mod: CPTII,S$GLB,, | Performed by: INTERNAL MEDICINE

## 2022-06-30 PROCEDURE — 1160F PR REVIEW ALL MEDS BY PRESCRIBER/CLIN PHARMACIST DOCUMENTED: ICD-10-PCS | Mod: CPTII,S$GLB,, | Performed by: INTERNAL MEDICINE

## 2022-06-30 PROCEDURE — 1159F MED LIST DOCD IN RCRD: CPT | Mod: CPTII,S$GLB,, | Performed by: INTERNAL MEDICINE

## 2022-06-30 NOTE — PROGRESS NOTES
Reason for consult:   Subjective:      Patient ID: Marguerite Colon is a 37 y.o. female.    Chief Complaint:: Follow-up    HPI\, This is a 37-year-old female with past medical history of Obesity , BMI 51, ORI on BiPAP, DLP on statin, DM, Hyperthyroidism, thyroid nodule follows wit Dr Ruiz psoriasis, psoriatic arthritis, tubal ligation, back pain, radiculopathy, anxiety, depression,  is referred to me for positive TB gold    Patient has no signs and symptoms of acute active tuberculosis.  She was never in her lifetime exposed or have any risk factors to have tuberculosis.  She understands risks and benefits of different options.  She decided to try rifampin four 4 months.  She was checked for HIV and chest x-ray about 6 7 months ago and it was negative.        06/30/2022  She feels some abdominal cramps when taking rifampin.  No nausea no vomiting.  She started taking rifampin on 06/13.  We discussed that for as long as she takes the daily dose, she may take 300 twice a day or 600 daily.  We discussed her history of liver fibrosis, need for vaccination for hepatitis a and B. alpha 1 anti trypsin deficiency/S heterozygous.    Review of patient's allergies indicates:   Allergen Reactions    Carboplatin      Had to go to ER.     Tuberculin ppd Rash     localized     Past Medical History:   Diagnosis Date    Anxiety     Bilateral headaches 4/1/2016    Cirrhosis     Diabetes mellitus     type 2    Hepatomegaly Aug 2014    Morbidly obese     Obesity, Class III, BMI 40-49.9 (morbid obesity) 4/1/2016    Ovarian cancer 12/2012    grade 1 endometrioid adenoca of the ovary    Pancreatitis     Small bowel obstruction due to adhesions Dec 2015     Thyroid disease     hypothyroid    Thyroid nodule      Past Surgical History:   Procedure Laterality Date    ABDOMINAL ADHESION SURGERY      done at same time with ovarian cystectomy    CHOLECYSTECTOMY  dec 2015    HERNIA REPAIR  12/28/2015    25 x 20 cm Venrtralex  ST mesh for repair done at time of [facility] for SBO.     HYSTERECTOMY  2 /25/2013    adeline rso. prior lso. ovarian ca    leep      positive margins    left salpingo oophrectomy  7/10    hemorrhagic cyst    OMENTECTOMY      partial right oophrectomy  12/12    LMP of ovary    [place] REMOVAL OF OVARY/TUBE(S)      x lap  12/28/2015    post open cholecystectomy 2/21/2015.      Social History     Tobacco Use    Smoking status: Current Every Day Smoker     Packs/day: 0.25     Years: 20.00     Pack years: 5.00     Types: Cigarettes    Smokeless tobacco: Never Used   Substance Use Topics    Alcohol use: No        Hunting:  Fishing:  Pets:---- dog   Exposure to sick contacts:  Exposure to TB:  Travel: --- [employer] x 1   Live with mother and sister    Lived with  2010--2021    Family History   Problem Relation Age of Onset    Diabetes Mother     Heart disease Mother     Stroke Mother     Hypertension Mother     Diabetes Father     Ovarian cancer Neg Hx     Uterine cancer Neg Hx     Breast cancer Neg Hx     Colon cancer Neg Hx        Review of Systems   Constitutional: Negative for appetite change, chills, diaphoresis, fatigue, fever and unexpected weight change.   HENT: Negative for congestion, dental problem, ear pain, hearing loss, mouth sores, nosebleeds, postnasal drip, rhinorrhea, sinus pain, sore throat and trouble swallowing.    Eyes: Negative for photophobia, pain and visual disturbance.   Respiratory: Negative for cough, choking, chest tightness and shortness of breath.    Cardiovascular: Negative for chest pain, palpitations and leg swelling.   Gastrointestinal: Negative for abdominal pain, anal bleeding, blood in stool, constipation, diarrhea, nausea, rectal pain and vomiting.   Endocrine: Negative for polydipsia and polyuria.   Genitourinary: Negative for difficulty urinating, dysuria, flank pain, frequency, genital sores, hematuria, urgency and vaginal discharge.   Musculoskeletal: Negative for  "arthralgias, back pain, joint swelling and myalgias.   Skin: Negative for rash.   Allergic/Immunologic: Negative for environmental allergies, food allergies and immunocompromised state.   Neurological: Negative for dizziness, syncope, speech difficulty, weakness, numbness and headaches.   Hematological: Negative for adenopathy. Does not bruise/bleed easily.   Psychiatric/Behavioral: Negative for dysphoric mood and sleep disturbance. The patient is not nervous/anxious.         Pertinent medications noted:     Objective:      Blood pressure 118/64, pulse 72, temperature 98.3 °F (36.8 °C), height 5' 7" (1.702 m), weight (!) 149.6 kg (329 lb 12.8 oz), last menstrual period 02/11/2013, SpO2 97 %.  Body mass index is 51.65 kg/m².  Physical Exam  Constitutional:       General: She is not in acute distress.     Appearance: She is not diaphoretic.   HENT:      Head: Atraumatic.      Right Ear: External ear normal.      Left Ear: External ear normal.      Nose: Nose normal.   Eyes:      General: No scleral icterus.     Conjunctiva/sclera: Conjunctivae normal.      Pupils: Pupils are equal, round, and reactive to light.   Neck:      Vascular: No JVD.   Cardiovascular:      Rate and Rhythm: Normal rate and regular rhythm.      Heart sounds: Normal heart sounds.   Pulmonary:      Effort: Pulmonary effort is normal.      Breath sounds: Normal breath sounds. No wheezing or rales.   Chest:      Chest wall: No tenderness.   Abdominal:      General: Bowel sounds are normal. There is no distension.      Palpations: Abdomen is soft. There is no mass.      Tenderness: There is no abdominal tenderness. There is no guarding or rebound.   Musculoskeletal:         General: Normal range of motion.      Cervical back: Normal range of motion and neck supple.   Lymphadenopathy:      Cervical: No cervical adenopathy.   Skin:     General: Skin is warm and dry.      Findings: No erythema or rash.   Neurological:      Mental Status: She is alert " and oriented to person, place, and time.      Cranial Nerves: No cranial nerve deficit.   Psychiatric:         Behavior: Behavior normal.         Thought Content: Thought content normal.         VAD:     General Labs reviewed:  Lab Results   Component Value Date    WBC 8.60 06/07/2022    RBC 4.94 06/07/2022    HGB 13.8 06/07/2022    HCT 44.4 06/07/2022    MCV 90 06/07/2022    MCH 27.9 06/07/2022    MCHC 31.1 (L) 06/07/2022    RDW 13.8 06/07/2022     06/07/2022    MPV 10.5 06/07/2022    GRAN 4.9 06/07/2022    GRAN 57.3 06/07/2022    LYMPH 2.8 06/07/2022    LYMPH 32.1 06/07/2022    MONO 0.5 06/07/2022    MONO 5.2 06/07/2022    EOS 0.4 06/07/2022    BASO 0.06 06/07/2022    EOSINOPHIL 4.2 06/07/2022    BASOPHIL 0.7 06/07/2022     CMP  Sodium   Date Value Ref Range Status   06/07/2022 133 (L) 136 - 145 mmol/L Final     Potassium   Date Value Ref Range Status   06/07/2022 4.0 3.5 - 5.1 mmol/L Final     Chloride   Date Value Ref Range Status   06/07/2022 97 95 - 110 mmol/L Final     CO2   Date Value Ref Range Status   06/07/2022 25 23 - 29 mmol/L Final     Glucose   Date Value Ref Range Status   06/07/2022 225 (H) 70 - 110 mg/dL Final     BUN   Date Value Ref Range Status   06/07/2022 13 6 - 20 mg/dL Final     Creatinine   Date Value Ref Range Status   06/07/2022 0.6 0.5 - 1.4 mg/dL Final   04/24/2013 0.8 0.5 - 1.4 mg/dL Final     Calcium   Date Value Ref Range Status   06/07/2022 9.1 8.7 - 10.5 mg/dL Final   04/24/2013 10.1 8.7 - 10.5 mg/dL Final     Total Protein   Date Value Ref Range Status   06/07/2022 7.9 6.0 - 8.4 g/dL Final     Albumin   Date Value Ref Range Status   06/07/2022 3.7 3.5 - 5.2 g/dL Final     Total Bilirubin   Date Value Ref Range Status   06/07/2022 0.5 0.1 - 1.0 mg/dL Final     Comment:     For infants and newborns, interpretation of results should be based  on gestational age, weight and in agreement with clinical  observations.    Premature Infant recommended reference ranges:  Up to 24  hours.............<8.0 mg/dL  Up to 48 hours............<12.0 mg/dL  3-5 days..................<15.0 mg/dL  6-29 days.................<15.0 mg/dL       Alkaline Phosphatase   Date Value Ref Range Status   06/07/2022 91 55 - 135 U/L Final     AST   Date Value Ref Range Status   06/07/2022 15 10 - 40 U/L Final     ALT   Date Value Ref Range Status   06/07/2022 20 10 - 44 U/L Final     Anion Gap   Date Value Ref Range Status   06/07/2022 11 8 - 16 mmol/L Final   04/24/2013 13 5 - 15 meq/L Final     eGFR if    Date Value Ref Range Status   06/07/2022 >60.0 >60 mL/min/1.73 m^2 Final     eGFR if non    Date Value Ref Range Status   06/07/2022 >60.0 >60 mL/min/1.73 m^2 Final     Comment:     Calculation used to obtain the estimated glomerular filtration  rate (eGFR) is the CKD-EPI equation.          Micro:  Microbiology Results (last 7 days)     ** No results found for the last 168 hours. **            Alpha 1 Antitrypsin Defiiciency Profile  Order: [id number]   Status: Final result     Visible to patient: Yes (seen)     Next appt: 07/18/2022 at 02:30 PM in Dermatology (Annalisa Calvillo MD)     Dx: Other cirrhosis of liver     0 Result Notes    Component Ref Range & Units 5 yr ago 6 yr ago   A1AT Proteotype S/Z, LC-MS/MS  SEE BELOW  SEE BELOW CM    Comment: S Mutation: Heterozygous *   Z Mutation: Negative   Results most consistent with MS phenotype.   * This is an abnormal result.   -------------------ADDITIONAL INFORMATION-------------------   This test was developed and its performance characteristics   determined by [facility] in a manner consistent with CLIA   requirements. This test has not been cleared or approved by   the U.[place]. Food and Drug Administration.    Alpha-1 Anti-Trypsin 100 - 190 mg/dL 168  164 CM    Comment: Test Performed by:   [place] [id number]   : Daryl Frias II, M.D.,  Ph.D.    Resulting Agency  [facility]                  Imaging Reviewed:    Assessment:       1. Positive QuantiFERON-TB Gold test    2. Fibrosis of liver    3. Heterozygous alpha 1-antitrypsin deficiency    4. Morbid obesity    5. Hx of ovarian cancer           Plan:       Positive QuantiFERON-TB Gold test  Comments:  Rifampin started 06/13/2022    Fibrosis of liver  -     hepatitis A and B vaccine, PF, (TWINRIX) 720 GUSTAVO unit- 20 mcg/mL Syrg suspension; Inject 1 mL (720 Units total) into the muscle every 28 days. for 2 doses  Dispense: 2 mL; Refill: 0    Heterozygous alpha 1-antitrypsin deficiency    Morbid obesity  Comments:  Unfortunately her medical insurance does not pay for her bariatric surgery.  She is working on losing weight    Hx of ovarian cancer    Obesity , BMI 51, ORI on BiPAP, DLP on statin, DM, Hyperthyroidism, thyroid nodule follows wit Dr Ruiz psoriasis, psoriatic arthritis, tubal ligation, back pain, radiculopathy, anxiety, depression,  Liver fibrosis    Follow up in about 2 months (around 8/30/2022).        LTBI  CXR NAPD  HIV screen neg  She would like to take rifampin x 4 months---06/13/2022    Reminded blood work monthly.  If she has any  issues with rifampin, I would like for her to stop it right away and call me.        This note was created using  voice recognition software that occasionally misinterpreted phrases or words.

## 2022-07-06 ENCOUNTER — PATIENT MESSAGE (OUTPATIENT)
Dept: INFECTIOUS DISEASES | Facility: CLINIC | Age: 37
End: 2022-07-06

## 2022-07-08 ENCOUNTER — LAB VISIT (OUTPATIENT)
Dept: LAB | Facility: HOSPITAL | Age: 37
End: 2022-07-08
Attending: INTERNAL MEDICINE
Payer: COMMERCIAL

## 2022-07-08 DIAGNOSIS — R76.12 POSITIVE QUANTIFERON-TB GOLD TEST: ICD-10-CM

## 2022-07-08 LAB
ALBUMIN SERPL BCP-MCNC: 3.7 G/DL (ref 3.5–5.2)
ALP SERPL-CCNC: 72 U/L (ref 55–135)
ALT SERPL W/O P-5'-P-CCNC: 21 U/L (ref 10–44)
ANION GAP SERPL CALC-SCNC: 9 MMOL/L (ref 8–16)
AST SERPL-CCNC: 14 U/L (ref 10–40)
BASOPHILS # BLD AUTO: 0.04 K/UL (ref 0–0.2)
BASOPHILS NFR BLD: 0.5 % (ref 0–1.9)
BILIRUB SERPL-MCNC: 0.4 MG/DL (ref 0.1–1)
BUN SERPL-MCNC: 18 MG/DL (ref 6–20)
CALCIUM SERPL-MCNC: 9.1 MG/DL (ref 8.7–10.5)
CHLORIDE SERPL-SCNC: 103 MMOL/L (ref 95–110)
CO2 SERPL-SCNC: 25 MMOL/L (ref 23–29)
CREAT SERPL-MCNC: 0.7 MG/DL (ref 0.5–1.4)
DIFFERENTIAL METHOD: ABNORMAL
EOSINOPHIL # BLD AUTO: 0.5 K/UL (ref 0–0.5)
EOSINOPHIL NFR BLD: 5.6 % (ref 0–8)
ERYTHROCYTE [DISTWIDTH] IN BLOOD BY AUTOMATED COUNT: 13.8 % (ref 11.5–14.5)
EST. GFR  (AFRICAN AMERICAN): >60 ML/MIN/1.73 M^2
EST. GFR  (NON AFRICAN AMERICAN): >60 ML/MIN/1.73 M^2
GLUCOSE SERPL-MCNC: 189 MG/DL (ref 70–110)
HCT VFR BLD AUTO: 43.2 % (ref 37–48.5)
HGB BLD-MCNC: 13.6 G/DL (ref 12–16)
IMM GRANULOCYTES # BLD AUTO: 0.04 K/UL (ref 0–0.04)
IMM GRANULOCYTES NFR BLD AUTO: 0.5 % (ref 0–0.5)
LYMPHOCYTES # BLD AUTO: 2.5 K/UL (ref 1–4.8)
LYMPHOCYTES NFR BLD: 31.1 % (ref 18–48)
MCH RBC QN AUTO: 28.3 PG (ref 27–31)
MCHC RBC AUTO-ENTMCNC: 31.5 G/DL (ref 32–36)
MCV RBC AUTO: 90 FL (ref 82–98)
MONOCYTES # BLD AUTO: 0.5 K/UL (ref 0.3–1)
MONOCYTES NFR BLD: 6.4 % (ref 4–15)
NEUTROPHILS # BLD AUTO: 4.5 K/UL (ref 1.8–7.7)
NEUTROPHILS NFR BLD: 55.9 % (ref 38–73)
NRBC BLD-RTO: 0 /100 WBC
PLATELET # BLD AUTO: 168 K/UL (ref 150–450)
PMV BLD AUTO: 10.8 FL (ref 9.2–12.9)
POTASSIUM SERPL-SCNC: 4.5 MMOL/L (ref 3.5–5.1)
PROT SERPL-MCNC: 7.9 G/DL (ref 6–8.4)
RBC # BLD AUTO: 4.8 M/UL (ref 4–5.4)
SODIUM SERPL-SCNC: 137 MMOL/L (ref 136–145)
WBC # BLD AUTO: 8 K/UL (ref 3.9–12.7)

## 2022-07-08 PROCEDURE — 36415 COLL VENOUS BLD VENIPUNCTURE: CPT | Performed by: INTERNAL MEDICINE

## 2022-07-08 PROCEDURE — 80053 COMPREHEN METABOLIC PANEL: CPT | Performed by: INTERNAL MEDICINE

## 2022-07-08 PROCEDURE — 85025 COMPLETE CBC W/AUTO DIFF WBC: CPT | Performed by: INTERNAL MEDICINE

## 2022-07-18 ENCOUNTER — OFFICE VISIT (OUTPATIENT)
Dept: DERMATOLOGY | Facility: CLINIC | Age: 37
End: 2022-07-18
Payer: COMMERCIAL

## 2022-07-18 DIAGNOSIS — L91.8 SKIN TAG: ICD-10-CM

## 2022-07-18 DIAGNOSIS — D48.5 NEOPLASM OF UNCERTAIN BEHAVIOR OF ADNEXA OF SKIN: ICD-10-CM

## 2022-07-18 DIAGNOSIS — L40.9 PSORIASIS: Primary | ICD-10-CM

## 2022-07-18 PROCEDURE — 11102 TANGNTL BX SKIN SINGLE LES: CPT | Mod: S$GLB,,, | Performed by: STUDENT IN AN ORGANIZED HEALTH CARE EDUCATION/TRAINING PROGRAM

## 2022-07-18 PROCEDURE — 11102 PR TANGENTIAL BIOPSY, SKIN, SINGLE LESION: ICD-10-PCS | Mod: S$GLB,,, | Performed by: STUDENT IN AN ORGANIZED HEALTH CARE EDUCATION/TRAINING PROGRAM

## 2022-07-18 PROCEDURE — 1159F PR MEDICATION LIST DOCUMENTED IN MEDICAL RECORD: ICD-10-PCS | Mod: CPTII,S$GLB,, | Performed by: STUDENT IN AN ORGANIZED HEALTH CARE EDUCATION/TRAINING PROGRAM

## 2022-07-18 PROCEDURE — 88305 TISSUE EXAM BY PATHOLOGIST: CPT | Mod: 26,,, | Performed by: PATHOLOGY

## 2022-07-18 PROCEDURE — 99999 PR PBB SHADOW E&M-EST. PATIENT-LVL III: CPT | Mod: PBBFAC,,, | Performed by: STUDENT IN AN ORGANIZED HEALTH CARE EDUCATION/TRAINING PROGRAM

## 2022-07-18 PROCEDURE — 1160F RVW MEDS BY RX/DR IN RCRD: CPT | Mod: CPTII,S$GLB,, | Performed by: STUDENT IN AN ORGANIZED HEALTH CARE EDUCATION/TRAINING PROGRAM

## 2022-07-18 PROCEDURE — 11900 PR INJECTION INTO SKIN LESIONS, UP TO 7: ICD-10-PCS | Mod: S$GLB,,, | Performed by: STUDENT IN AN ORGANIZED HEALTH CARE EDUCATION/TRAINING PROGRAM

## 2022-07-18 PROCEDURE — 88305 TISSUE EXAM BY PATHOLOGIST: CPT | Performed by: PATHOLOGY

## 2022-07-18 PROCEDURE — 99212 PR OFFICE/OUTPT VISIT, EST, LEVL II, 10-19 MIN: ICD-10-PCS | Mod: 25,S$GLB,, | Performed by: STUDENT IN AN ORGANIZED HEALTH CARE EDUCATION/TRAINING PROGRAM

## 2022-07-18 PROCEDURE — 1159F MED LIST DOCD IN RCRD: CPT | Mod: CPTII,S$GLB,, | Performed by: STUDENT IN AN ORGANIZED HEALTH CARE EDUCATION/TRAINING PROGRAM

## 2022-07-18 PROCEDURE — 99999 PR PBB SHADOW E&M-EST. PATIENT-LVL III: ICD-10-PCS | Mod: PBBFAC,,, | Performed by: STUDENT IN AN ORGANIZED HEALTH CARE EDUCATION/TRAINING PROGRAM

## 2022-07-18 PROCEDURE — 1160F PR REVIEW ALL MEDS BY PRESCRIBER/CLIN PHARMACIST DOCUMENTED: ICD-10-PCS | Mod: CPTII,S$GLB,, | Performed by: STUDENT IN AN ORGANIZED HEALTH CARE EDUCATION/TRAINING PROGRAM

## 2022-07-18 PROCEDURE — 11900 INJECT SKIN LESIONS </W 7: CPT | Mod: S$GLB,,, | Performed by: STUDENT IN AN ORGANIZED HEALTH CARE EDUCATION/TRAINING PROGRAM

## 2022-07-18 PROCEDURE — 99212 OFFICE O/P EST SF 10 MIN: CPT | Mod: 25,S$GLB,, | Performed by: STUDENT IN AN ORGANIZED HEALTH CARE EDUCATION/TRAINING PROGRAM

## 2022-07-18 PROCEDURE — 88305 TISSUE EXAM BY PATHOLOGIST: ICD-10-PCS | Mod: 26,,, | Performed by: PATHOLOGY

## 2022-07-18 NOTE — PROGRESS NOTES
Subjective:       Patient ID:  Marguerite Colon is a 37 y.o. female who presents for   Chief Complaint   Patient presents with    Follow-up     Psoriasis      LOV 5/30/22    Patient here today for f/u on psoriasis   Patient states she feels it has improved, no itching.  Using augmented betamethasone BID.   ILK done at last visit. Did not get taclonex from pharmacy.     Since last appointment saw Dr. Carey who started her on rifampin x 4 months which she started in mid June 2022. She is having upset stomach with the medication but is still taking it.   Was told she has hepatic fibrosis and get her hep B and A vaccines.     Patient complains of lesion(s)  Location: forehead  Duration: months  Symptoms: scaly, raised  Relieving factors/Previous treatments: none       Hx of depression/anxiety which has improved. Related to going through a divorce. She is now only on zoloft.         Hx of ovarian cancer in 2013- had chemo and complete hysterectomy         Review of Systems   Constitutional: Negative for fever, chills and fatigue.   Respiratory: Negative for cough and shortness of breath.    Gastrointestinal: Negative for nausea and vomiting.   Skin: Positive for rash.        Objective:    Physical Exam   Constitutional: She appears well-developed and well-nourished.   Neurological: She is alert and oriented to person, place, and time.   Psychiatric: She has a normal mood and affect.   Skin:   Areas Examined (abnormalities noted in diagram):   Head / Face Inspection Performed  Neck Inspection Performed  RUE Inspected  LUE Inspection Performed                   Diagram Legend     Erythematous scaling macule/papule c/w actinic keratosis       Vascular papule c/w angioma      Pigmented verrucoid papule/plaque c/w seborrheic keratosis      Yellow umbilicated papule c/w sebaceous hyperplasia      Irregularly shaped tan macule c/w lentigo     1-2 mm smooth white papules consistent with Milia      Movable subcutaneous cyst with  punctum c/w epidermal inclusion cyst      Subcutaneous movable cyst c/w pilar cyst      Firm pink to brown papule c/w dermatofibroma      Pedunculated fleshy papule(s) c/w skin tag(s)      Evenly pigmented macule c/w junctional nevus     Mildly variegated pigmented, slightly irregular-bordered macule c/w mildly atypical nevus      Flesh colored to evenly pigmented papule c/w intradermal nevus       Pink pearly papule/plaque c/w basal cell carcinoma      Erythematous hyperkeratotic cursted plaque c/w SCC      Surgical scar with no sign of skin cancer recurrence      Open and closed comedones      Inflammatory papules and pustules      Verrucoid papule consistent consistent with wart     Erythematous eczematous patches and plaques     Dystrophic onycholytic nail with subungual debris c/w onychomycosis     Umbilicated papule    Erythematous-base heme-crusted tan verrucoid plaque consistent with inflamed seborrheic keratosis     Erythematous Silvery Scaling Plaque c/w Psoriasis     See annotation          Assessment / Plan:      Pathology Orders:     Normal Orders This Visit    Specimen to Pathology, Dermatology     Questions:    Procedure Type: Dermatology and skin neoplasms    Number of Specimens: 1    ------------------------: -------------------------    Spec 1 Procedure: Biopsy    Spec 1 Clinical Impression: ISK vs. HAK vs other    Spec 1 Source: central forehead    Release to patient:         Psoriasis  Intralesional Kenalog 5mg/cc (0.8cc total) injected into 7 lesions on the right and left elbows today after obtaining verbal consent including risk of surrounding hypopigmentation. Patient tolerated procedure well.    Units: 1  NDC for Kenalog 10mg/cc:  3290-0150-88  She will get taclonex ointment from pharmacy    Neoplasm of uncertain behavior of adnexa of skin  -     Specimen to Pathology, Dermatology  Shave biopsy procedure note:    Shave biopsy performed after verbal consent including risk of infection, scar,  recurrence, need for additional treatment of site. Area prepped with alcohol, anesthetized with approximately 1.0cc of 1% lidocaine with epinephrine. Lesional tissue shaved with razor blade. Hemostasis achieved with application of aluminum chloride followed by hyfrecation. No complications. Dressing applied. Wound care explained.    Skin tag  - discussed that lesions are benign, non-cancerous. Removal is cosmetic. Price sheet provided to patient. Discussed risks of scarring and dyspigmentation after removal.              No follow-ups on file.

## 2022-07-18 NOTE — PATIENT INSTRUCTIONS
Shave Biopsy Wound Care    Your doctor has performed a shave biopsy today.  A band aid and vaseline ointment has been placed over the site.  This should remain in place for 24 hours.  It is recommended that you keep the area dry for the first 24 hours.  After 24 hours, you may remove the band aid and wash the area with warm soap and water and apply Vaseline jelly.  Many patients prefer to use Neosporin or Bacitracin ointment.  This is acceptable; however, know that you can develop an allergy to this medication even if you have used it safely for years.  It is important to keep the area moist.  Letting it dry out and get air slows healing time, and will worsen the scar.  Band aid is optional after first 24 hours.      If you notice increasing redness, tenderness, pain, or yellow drainage at the biopsy site, please notify your doctor.  These are signs of an infection.    If your biopsy site is bleeding, apply firm pressure for 15 minutes straight.  Repeat for another 15 minutes, if it is still bleeding.   If the surgical site continues to bleed, then please contact your doctor.      [facility] [place] [address]/ [phone] FAX/ [email or web address]

## 2022-07-22 ENCOUNTER — TELEPHONE (OUTPATIENT)
Dept: INFECTIOUS DISEASES | Facility: CLINIC | Age: 37
End: 2022-07-22

## 2022-07-22 ENCOUNTER — PATIENT MESSAGE (OUTPATIENT)
Dept: INFECTIOUS DISEASES | Facility: CLINIC | Age: 37
End: 2022-07-22

## 2022-07-22 DIAGNOSIS — R76.12 POSITIVE QUANTIFERON-TB GOLD TEST: Primary | ICD-10-CM

## 2022-07-22 RX ORDER — ONDANSETRON 4 MG/1
4 TABLET, FILM COATED ORAL EVERY 8 HOURS PRN
Qty: 42 TABLET | Refills: 1 | Status: SHIPPED | OUTPATIENT
Start: 2022-07-22 | End: 2022-08-21

## 2022-07-22 NOTE — TELEPHONE ENCOUNTER
See phone message in chart for 7/22/22  Dr Weathers addressed   -> Covering Dr Carey   Discussed with patient, advised to take Rifampin after meals to avoid GI symptoms   [facility]

## 2022-07-22 NOTE — TELEPHONE ENCOUNTER
PATIENT SENT MESSAGE :    # [phone]    I'm still experiencing stomach pain and nausea from Rifadin. Is there something you can prescribe for me to better help get through these side effects?  Thank you,  Marguerite      PLEASE ADVISE     --> Covering Dr Carey   Discussed with patient, advised to take Rifampin after meals to avoid GI symptoms   Zofran PRN for nausea sent to the [facility]

## 2022-07-27 ENCOUNTER — TELEPHONE (OUTPATIENT)
Dept: DERMATOLOGY | Facility: CLINIC | Age: 37
End: 2022-07-27
Payer: COMMERCIAL

## 2022-07-27 LAB
FINAL PATHOLOGIC DIAGNOSIS: NORMAL
GROSS: NORMAL
Lab: NORMAL
MICROSCOPIC EXAM: NORMAL

## 2022-08-29 ENCOUNTER — HOSPITAL ENCOUNTER (OUTPATIENT)
Dept: RADIOLOGY | Facility: HOSPITAL | Age: 37
Discharge: HOME | End: 2022-08-29
Attending: INTERNAL MEDICINE
Payer: COMMERCIAL

## 2022-08-29 DIAGNOSIS — E04.1 CYST OF THYROID: ICD-10-CM

## 2022-08-29 PROCEDURE — 76536 US EXAM OF HEAD AND NECK: CPT | Mod: TC,PO

## 2022-09-23 DIAGNOSIS — E04.1 THYROID NODULE: Primary | ICD-10-CM

## 2022-09-28 ENCOUNTER — TELEPHONE (OUTPATIENT)
Dept: RADIOLOGY | Facility: HOSPITAL | Age: 37
End: 2022-09-28

## 2022-09-28 NOTE — NURSING
Pt to arrive at [facility] at 12:30 pm on 10/18/22 for thyroid FNA. No need to fast, do not need a ride home. Pt verbalized understanding.

## 2022-10-04 ENCOUNTER — OFFICE VISIT (OUTPATIENT)
Dept: FAMILY MEDICINE | Facility: CLINIC | Age: 37
End: 2022-10-04
Payer: COMMERCIAL

## 2022-10-04 VITALS
TEMPERATURE: 98 F | DIASTOLIC BLOOD PRESSURE: 82 MMHG | WEIGHT: 293 LBS | HEIGHT: 67 IN | BODY MASS INDEX: 45.99 KG/M2 | SYSTOLIC BLOOD PRESSURE: 122 MMHG | RESPIRATION RATE: 20 BRPM | HEART RATE: 67 BPM | OXYGEN SATURATION: 94 %

## 2022-10-04 DIAGNOSIS — J06.9 ACUTE URI: ICD-10-CM

## 2022-10-04 DIAGNOSIS — R09.81 SINUS CONGESTION: Primary | ICD-10-CM

## 2022-10-04 PROCEDURE — 3008F BODY MASS INDEX DOCD: CPT | Mod: CPTII,S$GLB,, | Performed by: NURSE PRACTITIONER

## 2022-10-04 PROCEDURE — 87502 POCT INFLUENZA A/B MOLECULAR: ICD-10-PCS | Mod: QW,S$GLB,, | Performed by: NURSE PRACTITIONER

## 2022-10-04 PROCEDURE — 3008F PR BODY MASS INDEX (BMI) DOCUMENTED: ICD-10-PCS | Mod: CPTII,S$GLB,, | Performed by: NURSE PRACTITIONER

## 2022-10-04 PROCEDURE — 3074F PR MOST RECENT SYSTOLIC BLOOD PRESSURE < 130 MM HG: ICD-10-PCS | Mod: CPTII,S$GLB,, | Performed by: NURSE PRACTITIONER

## 2022-10-04 PROCEDURE — 1159F PR MEDICATION LIST DOCUMENTED IN MEDICAL RECORD: ICD-10-PCS | Mod: CPTII,S$GLB,, | Performed by: NURSE PRACTITIONER

## 2022-10-04 PROCEDURE — 99213 OFFICE O/P EST LOW 20 MIN: CPT | Mod: S$GLB,,, | Performed by: NURSE PRACTITIONER

## 2022-10-04 PROCEDURE — 1160F RVW MEDS BY RX/DR IN RCRD: CPT | Mod: CPTII,S$GLB,, | Performed by: NURSE PRACTITIONER

## 2022-10-04 PROCEDURE — 1160F PR REVIEW ALL MEDS BY PRESCRIBER/CLIN PHARMACIST DOCUMENTED: ICD-10-PCS | Mod: CPTII,S$GLB,, | Performed by: NURSE PRACTITIONER

## 2022-10-04 PROCEDURE — 87635: ICD-10-PCS | Mod: QW,S$GLB,, | Performed by: NURSE PRACTITIONER

## 2022-10-04 PROCEDURE — 1159F MED LIST DOCD IN RCRD: CPT | Mod: CPTII,S$GLB,, | Performed by: NURSE PRACTITIONER

## 2022-10-04 PROCEDURE — 87635 SARS-COV-2 COVID-19 AMP PRB: CPT | Mod: QW,S$GLB,, | Performed by: NURSE PRACTITIONER

## 2022-10-04 PROCEDURE — 3079F DIAST BP 80-89 MM HG: CPT | Mod: CPTII,S$GLB,, | Performed by: NURSE PRACTITIONER

## 2022-10-04 PROCEDURE — 3079F PR MOST RECENT DIASTOLIC BLOOD PRESSURE 80-89 MM HG: ICD-10-PCS | Mod: CPTII,S$GLB,, | Performed by: NURSE PRACTITIONER

## 2022-10-04 PROCEDURE — 99213 PR OFFICE/OUTPT VISIT, EST, LEVL III, 20-29 MIN: ICD-10-PCS | Mod: S$GLB,,, | Performed by: NURSE PRACTITIONER

## 2022-10-04 PROCEDURE — 87502 INFLUENZA DNA AMP PROBE: CPT | Mod: QW,S$GLB,, | Performed by: NURSE PRACTITIONER

## 2022-10-04 PROCEDURE — 3074F SYST BP LT 130 MM HG: CPT | Mod: CPTII,S$GLB,, | Performed by: NURSE PRACTITIONER

## 2022-10-04 RX ORDER — RIFAMPIN 300 MG/1
10 CAPSULE ORAL EVERY 12 HOURS
COMMUNITY
End: 2022-11-09

## 2022-10-04 NOTE — PROGRESS NOTES
SUBJECTIVE:      Patient ID: Marguerite Colon is a 37 y.o. female.    Chief Complaint: Nasal Congestion and Rhinitis    Marguerite is present for sinus problems. C/o sinus pressure (taran maxillary) x 3 days, denies pain to the area. She works at a  and exposed to sick children. Denies cough, fever, SOB, or sore throat. She started taking tylenol sinus when symptoms began 3 days ago. Reports mostly clear nasal drainage besides when waking up in the morning it will have a greenish appearance.      Sinus Problem  This is a new problem. Episode onset: 3 days ago. The problem has been gradually worsening since onset. There has been no fever. She is experiencing no pain. Associated symptoms include congestion and sinus pressure (Taran maxillary). Pertinent negatives include no chills, coughing, diaphoresis, ear pain, headaches, hoarse voice, neck pain, shortness of breath, sore throat or swollen glands. Past treatments include acetaminophen and oral decongestants. The treatment provided mild relief.     Family History   Problem Relation Age of Onset    Diabetes Mother     Heart disease Mother     Stroke Mother     Hypertension Mother     Diabetes Father     Ovarian cancer Neg Hx     Uterine cancer Neg Hx     Breast cancer Neg Hx     Colon cancer Neg Hx       Social History     Socioeconomic History    Marital status: Other   Tobacco Use    Smoking status: Every Day     Packs/day: 0.25     Years: 20.00     Pack years: 5.00     Types: Cigarettes    Smokeless tobacco: Never   Substance and Sexual Activity    Alcohol use: No    Drug use: No    Sexual activity: Not Currently     Partners: Male     Current Outpatient Medications   Medication Sig Dispense Refill    albuterol (VENTOLIN HFA) 90 mcg/actuation inhaler Inhale 2 puffs into the lungs every 6 (six) hours as needed for Wheezing. Rescue 18 g 0    calcipotriene-betamethasone (TACLONEX) ointment Apply topically once daily. 60 g 1    HUMULIN R U-500, CONC, KWIKPEN 500 unit/mL  "(3 mL) InPn INJECT 55 UNITS SUBCUTANEOUS BEFORE BREAKFAST, 65 UNITS BEFORE LUNCH & 78 UNITS BEFORE SUPPER      ketoconazole (NIZORAL) 2 % shampoo Apply topically 3 (three) times a week. Lather into affected areas. Rinse off in 5-10 min. Repeat every other day. 120 mL 11    metformin (GLUCOPHAGE) 500 MG tablet Take 500 mg by mouth 2 (two) times daily.       OZEMPIC 1 mg/dose (2 mg/1.5 mL) PnIj       rifAMpin (RIFADIN) 300 MG capsule Take 10 mg/kg by mouth every 12 (twelve) hours.      sertraline (ZOLOFT) 50 MG tablet TAKE 1 TABLET BY MOUTH EVERY DAY 90 tablet 1    atorvastatin (LIPITOR) 40 MG tablet Take 20 mg by mouth every evening.      insulin needles, disposable, 30 x 1/3 " Ndle Uses 4 daily, on multiple daily insulin injections 150 each 12     No current facility-administered medications for this visit.     Review of patient's allergies indicates:   Allergen Reactions    Carboplatin      Had to go to ER.     Tuberculin ppd Rash     localized      Past Medical History:   Diagnosis Date    Anxiety     Bilateral headaches 4/1/2016    Cirrhosis     Diabetes mellitus     type 2    Hepatomegaly Aug 2014    Morbidly obese     Obesity, Class III, BMI 40-49.9 (morbid obesity) 4/1/2016    Ovarian cancer 12/2012    grade 1 endometrioid adenoca of the ovary    Pancreatitis     Small bowel obstruction due to adhesions Dec 2015     Thyroid disease     hypothyroid    Thyroid nodule      Past Surgical History:   Procedure Laterality Date    ABDOMINAL ADHESION SURGERY      done at same time with ovarian cystectomy    CHOLECYSTECTOMY  dec 2015    HERNIA REPAIR  12/28/2015    25 x 20 cm Venrtralex ST mesh for repair done at time of [facility] for SBO.     HYSTERECTOMY  2 /25/2013    adeline rso. prior lso. ovarian ca    leep      positive margins    left salpingo oophrectomy  7/10    hemorrhagic cyst    OMENTECTOMY      partial right oophrectomy  12/12    LMP of ovary    NE REMOVAL OF OVARY/TUBE(S)      x lap  12/28/2015    post open " "cholecystectomy 2/21/2015.        Review of Systems   Constitutional:  Negative for activity change, appetite change, chills, diaphoresis, fatigue, fever and unexpected weight change.   HENT:  Positive for congestion, rhinorrhea and sinus pressure (Taran maxillary). Negative for ear pain, hearing loss, hoarse voice, postnasal drip, sinus pain, sore throat and trouble swallowing.    Eyes:  Negative for pain, discharge and visual disturbance.   Respiratory:  Negative for cough, chest tightness, shortness of breath and wheezing.    Cardiovascular:  Negative for chest pain and palpitations.   Gastrointestinal:  Negative for abdominal pain, diarrhea, nausea and vomiting.   Genitourinary:  Negative for difficulty urinating and dysuria.   Musculoskeletal:  Negative for arthralgias, myalgias and neck pain.   Skin:  Negative for rash.   Allergic/Immunologic: Negative for environmental allergies.   Neurological:  Negative for weakness and headaches.   Hematological:  Negative for adenopathy.   Psychiatric/Behavioral:  Negative for confusion, dysphoric mood and sleep disturbance.     OBJECTIVE:      Vitals:    10/04/22 1530   BP: 122/82   BP Location: Right arm   Patient Position: Sitting   BP Method: Large (Manual)   Pulse: 67   Resp: 20   Temp: 97.8 °F (36.6 °C)   TempSrc: Oral   SpO2: (!) 94%   Weight: (!) 144.4 kg (318 lb 6.4 oz)   Height: 5' 7" (1.702 m)     Physical Exam  Constitutional:       General: She is not in acute distress.     Appearance: Normal appearance. She is well-developed. She is obese. She is not ill-appearing.   HENT:      Head: Normocephalic and atraumatic.      Right Ear: Tympanic membrane, ear canal and external ear normal.      Left Ear: Tympanic membrane, ear canal and external ear normal.      Nose: Mucosal edema and congestion present. No rhinorrhea.      Right Turbinates: Not pale.      Left Turbinates: Not pale.      Right Sinus: No maxillary sinus tenderness or frontal sinus tenderness.      " Left Sinus: No maxillary sinus tenderness or frontal sinus tenderness.      Mouth/Throat:      Mouth: Mucous membranes are moist.      Pharynx: Oropharynx is clear. Uvula midline. No oropharyngeal exudate or posterior oropharyngeal erythema.   Eyes:      General:         Right eye: No discharge.         Left eye: No discharge.      Conjunctiva/sclera: Conjunctivae normal.      Pupils: Pupils are equal, round, and reactive to light.   Neck:      Thyroid: No thyromegaly.   Cardiovascular:      Rate and Rhythm: Normal rate and regular rhythm.      Heart sounds: Normal heart sounds.   Pulmonary:      Effort: Pulmonary effort is normal. No respiratory distress.      Breath sounds: Normal breath sounds. No wheezing, rhonchi or rales.   Musculoskeletal:      Cervical back: Normal range of motion and neck supple. No tenderness.   Lymphadenopathy:      Cervical: No cervical adenopathy.   Skin:     General: Skin is warm and dry.      Coloration: Skin is not jaundiced or pale.   Neurological:      Mental Status: She is alert and oriented to person, place, and time.   Psychiatric:         Mood and Affect: Mood normal.         Behavior: Behavior normal.         Thought Content: Thought content normal.         Judgment: Judgment normal.      Assessment:       1. Sinus congestion    2. Acute URI        Plan:       Sinus congestion  -     POCT Influenza A/B Molecular (neg)   -     POCT COVID-19 Rapid Screening (neg)     Acute URI   -Patient's URI is likely secondary to an underlying viral infection. Antibiotics will likely be ineffective and will increase risk of microbial resistance and potential medication side effects. The patient was advised to remain hydrated and take the following medications for symptomatic relief: 1) Alternate with Ibuprofen/Tylenol for myalgias/fever >100.4, 2) Flonase for rhinitis, and 3) Mucinex for chest congestion. Stop oral decongestants after 3 days of use and do nasal saline rinses. Patient will  return to clinic if symptoms worsen or persist in the next 3-5 days. .      Follow up if symptoms worsen or fail to improve.      10/4/2022 ELIZABETH Santos, FNP    This note was created using [employer] voice recognition software that occasionally misinterprets phrases or words.

## 2022-10-06 ENCOUNTER — PATIENT MESSAGE (OUTPATIENT)
Dept: FAMILY MEDICINE | Facility: CLINIC | Age: 37
End: 2022-10-06

## 2022-10-06 DIAGNOSIS — J06.9 ACUTE URI: Primary | ICD-10-CM

## 2022-10-06 RX ORDER — AMOXICILLIN AND CLAVULANATE POTASSIUM 875; 125 MG/1; MG/1
1 TABLET, FILM COATED ORAL 2 TIMES DAILY
Qty: 14 TABLET | Refills: 0 | Status: SHIPPED | OUTPATIENT
Start: 2022-10-06 | End: 2022-10-06 | Stop reason: SDUPTHER

## 2022-10-06 RX ORDER — AMOXICILLIN AND CLAVULANATE POTASSIUM 875; 125 MG/1; MG/1
1 TABLET, FILM COATED ORAL 2 TIMES DAILY
Qty: 14 TABLET | Refills: 0 | Status: SHIPPED | OUTPATIENT
Start: 2022-10-06 | End: 2022-10-13

## 2022-10-18 ENCOUNTER — HOSPITAL ENCOUNTER (OUTPATIENT)
Dept: RADIOLOGY | Facility: HOSPITAL | Age: 37
Discharge: HOME | End: 2022-10-18
Attending: INTERNAL MEDICINE
Payer: COMMERCIAL

## 2022-10-18 DIAGNOSIS — E04.1 THYROID NODULE: Primary | ICD-10-CM

## 2022-10-18 PROCEDURE — 27000342 US FINE NEEDLE ASPIRATION THYROID, FIRST LESION

## 2022-10-18 PROCEDURE — 88173 CYTOPATH EVAL FNA REPORT: CPT | Performed by: INTERNAL MEDICINE

## 2022-10-18 PROCEDURE — 25000003 PHARM REV CODE 250: Performed by: RADIOLOGY

## 2022-10-18 RX ORDER — LIDOCAINE HYDROCHLORIDE 10 MG/ML
INJECTION INFILTRATION; PERINEURAL
Status: COMPLETED | OUTPATIENT
Start: 2022-10-18 | End: 2022-10-18

## 2022-10-18 RX ADMIN — LIDOCAINE HYDROCHLORIDE 5 ML: 10 INJECTION, SOLUTION INFILTRATION; PERINEURAL at 01:10

## 2022-10-18 NOTE — PLAN OF CARE
Pt amb to us room.  Pt awake, alert, cooperative. Speech clear.  ID x 2 identifiers.  Pt gowned for exam. Pre procedure imaging initiated.

## 2022-10-18 NOTE — PLAN OF CARE
Fna completed.  Pt elías well. Band aid to left neck.  No bleeding noted.  Pt given d/c instructions and verbalized understanding. Pt amb out of dept with steady gait.

## 2022-10-19 ENCOUNTER — PATIENT MESSAGE (OUTPATIENT)
Dept: FAMILY MEDICINE | Facility: CLINIC | Age: 37
End: 2022-10-19

## 2022-10-27 LAB
CYTOLOGY TISS FNA DOC CYTO: ABNORMAL
DX ICD CODE: ABNORMAL
FNA PERFORMED BY: ABNORMAL
PATH REPORT.SITE OF ORIGIN SPEC: ABNORMAL
PATH REPORT.SITE OF ORIGIN SPEC: ABNORMAL
PATHOLOGIST NAME: ABNORMAL

## 2022-11-09 ENCOUNTER — OFFICE VISIT (OUTPATIENT)
Dept: FAMILY MEDICINE | Facility: CLINIC | Age: 37
End: 2022-11-09
Payer: COMMERCIAL

## 2022-11-09 VITALS
HEART RATE: 78 BPM | RESPIRATION RATE: 18 BRPM | HEIGHT: 67 IN | TEMPERATURE: 98 F | DIASTOLIC BLOOD PRESSURE: 72 MMHG | SYSTOLIC BLOOD PRESSURE: 134 MMHG | BODY MASS INDEX: 45.99 KG/M2 | WEIGHT: 293 LBS | OXYGEN SATURATION: 98 %

## 2022-11-09 DIAGNOSIS — R50.9 FEVER, UNSPECIFIED FEVER CAUSE: ICD-10-CM

## 2022-11-09 DIAGNOSIS — J10.1 INFLUENZA A: Primary | ICD-10-CM

## 2022-11-09 DIAGNOSIS — R05.1 ACUTE COUGH: ICD-10-CM

## 2022-11-09 LAB
CTP QC/QA: YES
CTP QC/QA: YES
POC MOLECULAR INFLUENZA A AGN: POSITIVE
POC MOLECULAR INFLUENZA B AGN: NEGATIVE
SARS-COV-2 RDRP RESP QL NAA+PROBE: NEGATIVE

## 2022-11-09 PROCEDURE — 3078F PR MOST RECENT DIASTOLIC BLOOD PRESSURE < 80 MM HG: ICD-10-PCS | Mod: CPTII,S$GLB,, | Performed by: NURSE PRACTITIONER

## 2022-11-09 PROCEDURE — 3078F DIAST BP <80 MM HG: CPT | Mod: CPTII,S$GLB,, | Performed by: NURSE PRACTITIONER

## 2022-11-09 PROCEDURE — 99213 OFFICE O/P EST LOW 20 MIN: CPT | Mod: S$GLB,,, | Performed by: NURSE PRACTITIONER

## 2022-11-09 PROCEDURE — 1159F PR MEDICATION LIST DOCUMENTED IN MEDICAL RECORD: ICD-10-PCS | Mod: CPTII,S$GLB,, | Performed by: NURSE PRACTITIONER

## 2022-11-09 PROCEDURE — 3075F PR MOST RECENT SYSTOLIC BLOOD PRESS GE 130-139MM HG: ICD-10-PCS | Mod: CPTII,S$GLB,, | Performed by: NURSE PRACTITIONER

## 2022-11-09 PROCEDURE — 3075F SYST BP GE 130 - 139MM HG: CPT | Mod: CPTII,S$GLB,, | Performed by: NURSE PRACTITIONER

## 2022-11-09 PROCEDURE — 3008F PR BODY MASS INDEX (BMI) DOCUMENTED: ICD-10-PCS | Mod: CPTII,S$GLB,, | Performed by: NURSE PRACTITIONER

## 2022-11-09 PROCEDURE — 87635: ICD-10-PCS | Mod: QW,S$GLB,, | Performed by: NURSE PRACTITIONER

## 2022-11-09 PROCEDURE — 99213 PR OFFICE/OUTPT VISIT, EST, LEVL III, 20-29 MIN: ICD-10-PCS | Mod: S$GLB,,, | Performed by: NURSE PRACTITIONER

## 2022-11-09 PROCEDURE — 1160F PR REVIEW ALL MEDS BY PRESCRIBER/CLIN PHARMACIST DOCUMENTED: ICD-10-PCS | Mod: CPTII,S$GLB,, | Performed by: NURSE PRACTITIONER

## 2022-11-09 PROCEDURE — 87502 INFLUENZA DNA AMP PROBE: CPT | Mod: QW,S$GLB,, | Performed by: NURSE PRACTITIONER

## 2022-11-09 PROCEDURE — 87635 SARS-COV-2 COVID-19 AMP PRB: CPT | Mod: QW,S$GLB,, | Performed by: NURSE PRACTITIONER

## 2022-11-09 PROCEDURE — 1160F RVW MEDS BY RX/DR IN RCRD: CPT | Mod: CPTII,S$GLB,, | Performed by: NURSE PRACTITIONER

## 2022-11-09 PROCEDURE — 3008F BODY MASS INDEX DOCD: CPT | Mod: CPTII,S$GLB,, | Performed by: NURSE PRACTITIONER

## 2022-11-09 PROCEDURE — 87502 POCT INFLUENZA A/B MOLECULAR: ICD-10-PCS | Mod: QW,S$GLB,, | Performed by: NURSE PRACTITIONER

## 2022-11-09 PROCEDURE — 1159F MED LIST DOCD IN RCRD: CPT | Mod: CPTII,S$GLB,, | Performed by: NURSE PRACTITIONER

## 2022-11-09 RX ORDER — SEMAGLUTIDE 2.68 MG/ML
2 INJECTION, SOLUTION SUBCUTANEOUS
COMMUNITY
Start: 2022-06-01 | End: 2023-10-25 | Stop reason: SDUPTHER

## 2022-11-09 RX ORDER — PROMETHAZINE HYDROCHLORIDE AND DEXTROMETHORPHAN HYDROBROMIDE 6.25; 15 MG/5ML; MG/5ML
5 SYRUP ORAL NIGHTLY PRN
Qty: 118 ML | Refills: 0 | Status: ON HOLD | OUTPATIENT
Start: 2022-11-09 | End: 2022-11-16 | Stop reason: ALTCHOICE

## 2022-11-09 RX ORDER — BROMPHENIRAMINE MALEATE, PSEUDOEPHEDRINE HYDROCHLORIDE, AND DEXTROMETHORPHAN HYDROBROMIDE 2; 30; 10 MG/5ML; MG/5ML; MG/5ML
10 SYRUP ORAL EVERY 4 HOURS PRN
Qty: 473 ML | Refills: 0 | Status: SHIPPED | OUTPATIENT
Start: 2022-11-09 | End: 2022-11-19

## 2022-11-09 RX ORDER — ALBUTEROL SULFATE 90 UG/1
2 AEROSOL, METERED RESPIRATORY (INHALATION) EVERY 6 HOURS PRN
Qty: 18 G | Refills: 0 | Status: SHIPPED | OUTPATIENT
Start: 2022-11-09 | End: 2023-10-25

## 2022-11-09 NOTE — PROGRESS NOTES
SUBJECTIVE:      Patient ID: Marguerite Colon is a 37 y.o. female.    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 HPI as below. She has been using inhalers and OTC medications without relief. 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 She has not been vaccinated for the flu yet. Denies CP, SOB, wheezing, fevers, nausea, vomiting, diarrhea, constipation, numbness, weakness, dizziness, palpitations, or any other concerns at this time.       Cough  This is a new problem. The current episode started in the past 7 days. The problem has been rapidly worsening. The problem occurs every few minutes. The cough is Non-productive. Associated symptoms include a fever, headaches, nasal congestion, postnasal drip and rhinorrhea. Pertinent negatives include no chest pain, chills, ear congestion, ear pain, heartburn, hemoptysis, myalgias, rash, sore throat, shortness of breath, sweats, weight loss or wheezing. Nothing aggravates the symptoms. She has tried OTC cough suppressant, a beta-agonist inhaler and rest for the symptoms. The treatment provided no relief. There is no history of asthma, bronchiectasis, bronchitis, COPD, emphysema, environmental allergies or pneumonia.     Past Surgical History:   Procedure Laterality Date    ABDOMINAL ADHESION SURGERY      done at same time with ovarian cystectomy    CHOLECYSTECTOMY  dec 2015    HERNIA REPAIR  12/28/2015    25 x 20 cm Venrtralex ST mesh for repair done at time of [facility] for SBO.     HYSTERECTOMY  2 /25/2013    adeline rso. prior lso. ovarian ca    leep      positive margins    left salpingo oophrectomy  7/10    hemorrhagic cyst    OMENTECTOMY      partial right oophrectomy  12/12    LMP of ovary    CA REMOVAL OF OVARY/TUBE(S)      x lap  12/28/2015    post open cholecystectomy 2/21/2015.      Family History   Problem Relation Age of Onset    Diabetes Mother     Heart disease  "Mother     Stroke Mother     Hypertension Mother     Diabetes Father     Ovarian cancer Neg Hx     Uterine cancer Neg Hx     Breast cancer Neg Hx     Colon cancer Neg Hx       Social History     Socioeconomic History    Marital status: Other   Tobacco Use    Smoking status: Every Day     Packs/day: 0.25     Years: 20.00     Pack years: 5.00     Types: Cigarettes    Smokeless tobacco: Never   Substance and Sexual Activity    Alcohol use: No    Drug use: No    Sexual activity: Not Currently     Partners: Male     Current Outpatient Medications   Medication Sig Dispense Refill    atorvastatin (LIPITOR) 40 MG tablet Take 20 mg by mouth every evening.      calcipotriene-betamethasone (TACLONEX) ointment Apply topically once daily. 60 g 1    HUMULIN R U-500, CONC, KWIKPEN 500 unit/mL (3 mL) InPn INJECT 55 UNITS SUBCUTANEOUS BEFORE BREAKFAST, 65 UNITS BEFORE LUNCH & 78 UNITS BEFORE SUPPER      insulin needles, disposable, 30 x 1/3 " Ndle Uses 4 daily, on multiple daily insulin injections 150 each 12    metformin (GLUCOPHAGE) 500 MG tablet Take 500 mg by mouth 2 (two) times daily.       OZEMPIC 2 mg/dose (8 mg/3 mL) PnIj Inject 2 mg into the skin every 7 days.      sertraline (ZOLOFT) 50 MG tablet TAKE 1 TABLET BY MOUTH EVERY DAY 90 tablet 0    albuterol (VENTOLIN HFA) 90 mcg/actuation inhaler Inhale 2 puffs into the lungs every 6 (six) hours as needed for Wheezing. Rescue 18 g 0    brompheniramine-pseudoeph-DM (BROMFED DM) 2-30-10 mg/5 mL Syrp Take 10 mLs by mouth every 4 (four) hours as needed (cough, congestion). 473 mL 0    ketoconazole (NIZORAL) 2 % shampoo Apply topically 3 (three) times a week. Lather into affected areas. Rinse off in 5-10 min. Repeat every other day. 120 mL 11    promethazine-dextromethorphan (PROMETHAZINE-DM) 6.25-15 mg/5 mL Syrp Take 5 mLs by mouth nightly as needed (nighttime cough). 118 mL 0     No current facility-administered medications for this visit.     Review of patient's allergies " indicates:   Allergen Reactions    Carboplatin      Had to go to ER.     Tuberculin ppd Rash     localized      Past Medical History:   Diagnosis Date    Anxiety     Bilateral headaches 4/1/2016    Cirrhosis     Diabetes mellitus     type 2    Hepatomegaly Aug 2014    Morbidly obese     Obesity, Class III, BMI 40-49.9 (morbid obesity) 4/1/2016    Ovarian cancer 12/2012    grade 1 endometrioid adenoca of the ovary    Pancreatitis     Small bowel obstruction due to adhesions Dec 2015     Thyroid disease     hypothyroid    Thyroid nodule      Past Surgical History:   Procedure Laterality Date    ABDOMINAL ADHESION SURGERY      done at same time with ovarian cystectomy    CHOLECYSTECTOMY  dec 2015    HERNIA REPAIR  12/28/2015    25 x 20 cm Venrtralex ST mesh for repair done at time of [facility] for SBO.     HYSTERECTOMY  2 /25/2013    adeline rso. prior lso. ovarian ca    leep      positive margins    left salpingo oophrectomy  7/10    hemorrhagic cyst    OMENTECTOMY      partial right oophrectomy  12/12    LMP of ovary    [place] REMOVAL OF OVARY/TUBE(S)      x lap  12/28/2015    post open cholecystectomy 2/21/2015.        Review of Systems   Constitutional:  Positive for fever. Negative for activity change, appetite change, chills, fatigue, unexpected weight change and weight loss.   HENT:  Positive for congestion, postnasal drip and rhinorrhea. Negative for ear pain, sinus pressure, sinus pain, sneezing and sore throat.    Eyes:  Negative for pain, discharge and visual disturbance.   Respiratory:  Positive for cough. Negative for hemoptysis, chest tightness, shortness of breath and wheezing.    Cardiovascular:  Negative for chest pain, palpitations and leg swelling.   Gastrointestinal:  Negative for abdominal distention, abdominal pain, blood in stool, constipation, diarrhea, heartburn, nausea and vomiting.   Genitourinary:  Negative for difficulty urinating, flank pain, frequency, hematuria, pelvic pain, urgency and vaginal  "discharge.   Musculoskeletal:  Negative for back pain, joint swelling and myalgias.   Skin:  Negative for color change, rash and wound.   Allergic/Immunologic: Negative for environmental allergies.   Neurological:  Positive for headaches. Negative for dizziness, seizures, syncope, weakness, light-headedness and numbness.   Hematological:  Negative for adenopathy.   Psychiatric/Behavioral:  Negative for agitation, confusion, dysphoric mood, hallucinations, sleep disturbance and suicidal ideas. The patient is not nervous/anxious.     OBJECTIVE:      Vitals:    11/09/22 1624   BP: 134/72   Pulse: 78   Resp: 18   Temp: 97.6 °F (36.4 °C)   SpO2: 98%   Weight: (!) 141.3 kg (311 lb 9.6 oz)   Height: 5' 7" (1.702 m)     Physical Exam  Vitals reviewed.   Constitutional:       General: She is not in acute distress.     Appearance: Normal appearance. She is well-developed. She is morbidly obese. She is not diaphoretic.   HENT:      Head: Normocephalic and atraumatic.      Right Ear: Hearing, tympanic membrane, ear canal and external ear normal.      Left Ear: Hearing, tympanic membrane, ear canal and external ear normal.      Nose: Congestion present. No mucosal edema or rhinorrhea.      Mouth/Throat:      Lips: Pink.      Mouth: Mucous membranes are moist.      Pharynx: Oropharynx is clear. Uvula midline. No pharyngeal swelling, oropharyngeal exudate or posterior oropharyngeal erythema.   Eyes:      General: Lids are normal. No scleral icterus.        Right eye: No discharge.         Left eye: No discharge.      Extraocular Movements: Extraocular movements intact.      Conjunctiva/sclera: Conjunctivae normal.      Pupils: Pupils are equal, round, and reactive to light.   Neck:      Thyroid: No thyroid mass or thyromegaly.      Vascular: No carotid bruit.      Trachea: Trachea and phonation normal. No tracheal deviation.   Cardiovascular:      Rate and Rhythm: Normal rate and regular rhythm.      Pulses: Normal pulses.      " Heart sounds: Normal heart sounds. No murmur heard.    No friction rub. No gallop.   Pulmonary:      Effort: Pulmonary effort is normal. No respiratory distress.      Breath sounds: Normal breath sounds. No stridor. No decreased breath sounds, wheezing, rhonchi or rales.   Abdominal:      General: Bowel sounds are normal.      Palpations: Abdomen is soft.      Tenderness: There is no abdominal tenderness.   Musculoskeletal:         General: Normal range of motion.      Cervical back: Full passive range of motion without pain, normal range of motion and neck supple.      Right lower leg: No edema.      Left lower leg: No edema.   Lymphadenopathy:      Cervical: No cervical adenopathy.      Upper Body:      Right upper body: No supraclavicular adenopathy.      Left upper body: No supraclavicular adenopathy.   Skin:     General: Skin is warm and dry.      Capillary Refill: Capillary refill takes less than 2 seconds.      Findings: No rash.   Neurological:      General: No focal deficit present.      Mental Status: She is alert and oriented to person, place, and time.      GCS: GCS eye subscore is 4. GCS verbal subscore is 5. GCS motor subscore is 6.      Coordination: Coordination is intact.      Gait: Gait is intact.   Psychiatric:         Attention and Perception: Attention and perception normal.         Mood and Affect: Mood and affect normal.         Speech: Speech normal.         Behavior: Behavior normal. Behavior is cooperative.         Thought Content: Thought content normal. Thought content does not include suicidal plan.         Cognition and Memory: Cognition and memory normal.         Judgment: Judgment normal.      Assessment:       1. Influenza A    2. Fever, unspecified fever cause    3. Acute cough          Plan:       Influenza A  Flu A positive, COVID negative. Treatment as below. Advised not to take Phenergan DM with any other medications causing drowsiness. Recommended she not take the daytime syrup  too close to bedtime. Verbalized understanding. She will call clinic if Xofluza is too expensive for her to fill.  -     baloxavir marboxiL (XOFLUZA) 40 mg tablet; Take 2 tablets (80 mg total) by mouth once. Please run as Cash with coupon for 1 dose  Dispense: 2 tablet; Refill: 0  -     albuterol (VENTOLIN HFA) 90 mcg/actuation inhaler; Inhale 2 puffs into the lungs every 6 (six) hours as needed for Wheezing. Rescue  Dispense: 18 g; Refill: 0  -     promethazine-dextromethorphan (PROMETHAZINE-DM) 6.25-15 mg/5 mL Syrp; Take 5 mLs by mouth nightly as needed (nighttime cough).  Dispense: 118 mL; Refill: 0  -     brompheniramine-pseudoeph-DM (BROMFED DM) 2-30-10 mg/5 mL Syrp; Take 10 mLs by mouth every 4 (four) hours as needed (cough, congestion).  Dispense: 473 mL; Refill: 0    Fever, unspecified fever cause  -     POCT COVID-19 Rapid Screening  -     POCT Influenza A/B Molecular    Acute cough  -     POCT COVID-19 Rapid Screening  -     POCT Influenza A/B Molecular      Follow up if symptoms worsen or fail to improve.      11/10/2022 ELIZABETH Torres, [place]

## 2022-11-10 ENCOUNTER — OFFICE VISIT (OUTPATIENT)
Dept: SURGERY | Facility: CLINIC | Age: 37
End: 2022-11-10
Payer: COMMERCIAL

## 2022-11-10 VITALS
BODY MASS INDEX: 45.99 KG/M2 | DIASTOLIC BLOOD PRESSURE: 79 MMHG | SYSTOLIC BLOOD PRESSURE: 110 MMHG | WEIGHT: 293 LBS | TEMPERATURE: 98 F | HEART RATE: 67 BPM | HEIGHT: 67 IN

## 2022-11-10 DIAGNOSIS — D49.7 THYROID NEOPLASM: Primary | ICD-10-CM

## 2022-11-10 PROCEDURE — 3078F DIAST BP <80 MM HG: CPT | Mod: CPTII,S$GLB,, | Performed by: SURGERY

## 2022-11-10 PROCEDURE — 3074F SYST BP LT 130 MM HG: CPT | Mod: CPTII,S$GLB,, | Performed by: SURGERY

## 2022-11-10 PROCEDURE — 1159F PR MEDICATION LIST DOCUMENTED IN MEDICAL RECORD: ICD-10-PCS | Mod: CPTII,S$GLB,, | Performed by: SURGERY

## 2022-11-10 PROCEDURE — 3008F PR BODY MASS INDEX (BMI) DOCUMENTED: ICD-10-PCS | Mod: CPTII,S$GLB,, | Performed by: SURGERY

## 2022-11-10 PROCEDURE — 99204 OFFICE O/P NEW MOD 45 MIN: CPT | Mod: S$GLB,,, | Performed by: SURGERY

## 2022-11-10 PROCEDURE — 1160F PR REVIEW ALL MEDS BY PRESCRIBER/CLIN PHARMACIST DOCUMENTED: ICD-10-PCS | Mod: CPTII,S$GLB,, | Performed by: SURGERY

## 2022-11-10 PROCEDURE — 3008F BODY MASS INDEX DOCD: CPT | Mod: CPTII,S$GLB,, | Performed by: SURGERY

## 2022-11-10 PROCEDURE — 3074F PR MOST RECENT SYSTOLIC BLOOD PRESSURE < 130 MM HG: ICD-10-PCS | Mod: CPTII,S$GLB,, | Performed by: SURGERY

## 2022-11-10 PROCEDURE — 1160F RVW MEDS BY RX/DR IN RCRD: CPT | Mod: CPTII,S$GLB,, | Performed by: SURGERY

## 2022-11-10 PROCEDURE — 99204 PR OFFICE/OUTPT VISIT, NEW, LEVL IV, 45-59 MIN: ICD-10-PCS | Mod: S$GLB,,, | Performed by: SURGERY

## 2022-11-10 PROCEDURE — 1159F MED LIST DOCD IN RCRD: CPT | Mod: CPTII,S$GLB,, | Performed by: SURGERY

## 2022-11-10 PROCEDURE — 3078F PR MOST RECENT DIASTOLIC BLOOD PRESSURE < 80 MM HG: ICD-10-PCS | Mod: CPTII,S$GLB,, | Performed by: SURGERY

## 2022-11-10 NOTE — PROGRESS NOTES
Subjective:       Patient ID: Marguerite Colon is a 37 y.o. female.    Chief Complaint: Consult (Thyroid)      HPI:  37 year old female referred to the office with a bethesda category 5 FNA of a 19 mm left thyroid nodule. She has a known 19 mm left thyuoroid nodule. It had nben stable in size but never biopsied. FNA returend suspicious for papillary carcinoma. She has no compressive symtoms. She has no constitutional symptoms.  No family history of thyroid cancer.  She has personal history of ovarian cancer 10 years ago.  No history of previous neck surgery or radiation. She has history of liver cirrhosis.     : LEFT THYROID   SUSPICIOUS FOR MALIGNANCY.   BETHESDA CATEGORY V. SUSPICIOUS FOR PAPILLARY CARCINOMA. SPECIMEN   CONSISTS OF FOLLICULAR CELLS WITH NUCLEAR ENLARGEMENT, NUCLEAR PALLOR   WITH   GROOVES. INTRANUCLEAR PSEUDOINCLUSIONS ARE RARE. THIS PATTERN IS   SUSPICIOUS   FOR PAPILLARY CARCINOMA.     Past Medical History:   Diagnosis Date    Anxiety     Bilateral headaches 4/1/2016    Cirrhosis     Diabetes mellitus     type 2    Hepatomegaly Aug 2014    Morbidly obese     Obesity, Class III, BMI 40-49.9 (morbid obesity) 4/1/2016    Ovarian cancer 12/2012    grade 1 endometrioid adenoca of the ovary    Pancreatitis     Small bowel obstruction due to adhesions Dec 2015     Thyroid disease     hypothyroid    Thyroid nodule      Past Surgical History:   Procedure Laterality Date    ABDOMINAL ADHESION SURGERY      done at same time with ovarian cystectomy    CHOLECYSTECTOMY  dec 2015    HERNIA REPAIR  12/28/2015    25 x 20 cm Venrtralex ST mesh for repair done at time of [facility] for SBO.     HYSTERECTOMY  2 /25/2013    adeline rso. prior lso. ovarian ca    leep      positive margins    left salpingo oophrectomy  7/10    hemorrhagic cyst    OMENTECTOMY      partial right oophrectomy  12/12    LMP of ovary    [place] REMOVAL OF OVARY/TUBE(S)      x lap  12/28/2015    post open cholecystectomy 2/21/2015.      Review of patient's  "allergies indicates:   Allergen Reactions    Carboplatin      Had to go to ER.     Tuberculin ppd Rash     localized     Medication List with Changes/Refills   Current Medications    ALBUTEROL (VENTOLIN HFA) 90 MCG/ACTUATION INHALER    Inhale 2 puffs into the lungs every 6 (six) hours as needed for Wheezing. Rescue    ATORVASTATIN (LIPITOR) 40 MG TABLET    Take 20 mg by mouth every evening.    BROMPHENIRAMINE-PSEUDOEPH-DM (BROMFED DM) 2-30-10 MG/5 ML SYRP    Take 10 mLs by mouth every 4 (four) hours as needed (cough, congestion).    CALCIPOTRIENE-BETAMETHASONE (TACLONEX) OINTMENT    Apply topically once daily.    HUMULIN R U-500, CONC, KWIKPEN 500 UNIT/ML (3 ML) INPN    INJECT 55 UNITS SUBCUTANEOUS BEFORE BREAKFAST, 65 UNITS BEFORE LUNCH & 78 UNITS BEFORE SUPPER    INSULIN NEEDLES, DISPOSABLE, 30 X 1/3 " NDLE    Uses 4 daily, on multiple daily insulin injections    METFORMIN (GLUCOPHAGE) 500 MG TABLET    Take 500 mg by mouth 2 (two) times daily.     OZEMPIC 2 MG/DOSE (8 MG/3 ML) PNIJ    Inject 2 mg into the skin every 7 days.    PROMETHAZINE-DEXTROMETHORPHAN (PROMETHAZINE-DM) 6.25-15 MG/5 ML SYRP    Take 5 mLs by mouth nightly as needed (nighttime cough).    SERTRALINE (ZOLOFT) 50 MG TABLET    TAKE 1 TABLET BY MOUTH EVERY DAY   Discontinued Medications    BALOXAVIR MARBOXIL (XOFLUZA) 40 MG TABLET    Take 2 tablets (80 mg total) by mouth once. Please run as Cash with coupon for 1 dose    KETOCONAZOLE (NIZORAL) 2 % SHAMPOO    Apply topically 3 (three) times a week. Lather into affected areas. Rinse off in 5-10 min. Repeat every other day.     Family History   Problem Relation Age of Onset    Diabetes Mother     Heart disease Mother     Stroke Mother     Hypertension Mother     Diabetes Father     Ovarian cancer Neg Hx     Uterine cancer Neg Hx     Breast cancer Neg Hx     Colon cancer Neg Hx      Social History     Socioeconomic History    Marital status: Other   Tobacco Use    Smoking status: Every Day     Packs/day: " 0.25     Years: 20.00     Pack years: 5.00     Types: Cigarettes    Smokeless tobacco: Never   Substance and Sexual Activity    Alcohol use: No    Drug use: No    Sexual activity: Not Currently     Partners: Male         Review of Systems   Constitutional:  Negative for appetite change, chills, fever and unexpected weight change.   HENT:  Negative for hearing loss, rhinorrhea, sore throat and voice change.    Eyes:  Negative for photophobia and visual disturbance.   Respiratory:  Negative for cough, choking and shortness of breath.    Cardiovascular:  Negative for chest pain, palpitations and leg swelling.   Gastrointestinal:  Negative for abdominal pain, blood in stool, constipation, diarrhea, nausea and vomiting.   Endocrine: Negative for cold intolerance, heat intolerance, polydipsia and polyuria.   Musculoskeletal:  Negative for arthralgias, back pain, joint swelling and neck stiffness.   Skin:  Negative for color change, pallor and rash.   Neurological:  Negative for dizziness, seizures, syncope and headaches.   Hematological:  Negative for adenopathy. Does not bruise/bleed easily.   Psychiatric/Behavioral:  Negative for agitation, behavioral problems and confusion.      Objective:      Physical Exam  Constitutional:       General: She is not in acute distress.     Appearance: Normal appearance. She is well-developed. She is not toxic-appearing.   HENT:      Head: Normocephalic and atraumatic. No abrasion or laceration.      Right Ear: External ear normal.      Left Ear: External ear normal.      Nose: Nose normal.   Eyes:      Pupils: Pupils are equal, round, and reactive to light.   Neck:      Thyroid: No thyroid mass or thyromegaly.      Trachea: Trachea and phonation normal. No tracheal deviation.   Cardiovascular:      Rate and Rhythm: Normal rate and regular rhythm.   Pulmonary:      Effort: Pulmonary effort is normal. No tachypnea, accessory muscle usage or respiratory distress.   Abdominal:       General: There is no distension.      Palpations: Abdomen is soft.      Tenderness: There is no abdominal tenderness.   Musculoskeletal:      Cervical back: Neck supple. Normal range of motion.   Lymphadenopathy:      Cervical: No cervical adenopathy.   Skin:     General: Skin is warm.   Neurological:      Mental Status: She is alert and oriented to person, place, and time.      Coordination: Coordination normal.      Gait: Gait normal.   Psychiatric:         Speech: Speech normal.         Behavior: Behavior normal.       Assessment/Plan:   Thyroid neoplasm  -     Full code; Standing  -     Vital signs; Standing  -     Insert peripheral IV; Standing  -     Diet NPO; Standing  -     Pulse Oximetry Q4H; Standing  -     Case Request Operating Room: THYROIDECTOMY  -     Place in Outpatient; Standing  -     Place sequential compression device; Standing  -     Basic metabolic panel; Future; Expected date: 11/10/2022  -     CBC auto differential; Future; Expected date: 11/10/2022  -     EKG 12-lead; Future  -     X-Ray Chest PA And Lateral; Future; Expected date: 11/10/2022    Other orders  -     ceFAZolin (ANCEF) 2 g in dextrose 5 % 50 mL IVPB    FNA of the 19 mm thyroid lesion has returned suspicious for papillary carcinoma.  We had long discussion about her case today.  Will proceed with total thyroidectomy November 16th.  Risks and benefits of the procedure discussed with the patient.      I discussed the proposed procedures with the patient including risks, benefits, indications, alternatives and special concerns.  The patient appears to understand and agrees to go ahead with surgery.  I have made no promises, warranties or verbal agreements beyond what was discussed above.    No follow-ups on file.

## 2022-11-10 NOTE — H&P (VIEW-ONLY)
Subjective:       Patient ID: Marguerite Colon is a 37 y.o. female.    Chief Complaint: Consult (Thyroid)      HPI:  37 year old female referred to the office with a bethesda category 5 FNA of a 19 mm left thyroid nodule. She has a known 19 mm left thyuoroid nodule. It had nben stable in size but never biopsied. FNA returend suspicious for papillary carcinoma. She has no compressive symtoms. She has no constitutional symptoms.  No family history of thyroid cancer.  She has personal history of ovarian cancer 10 years ago.  No history of previous neck surgery or radiation. She has history of liver cirrhosis.     : LEFT THYROID   SUSPICIOUS FOR MALIGNANCY.   BETHESDA CATEGORY V. SUSPICIOUS FOR PAPILLARY CARCINOMA. SPECIMEN   CONSISTS OF FOLLICULAR CELLS WITH NUCLEAR ENLARGEMENT, NUCLEAR PALLOR   WITH   GROOVES. INTRANUCLEAR PSEUDOINCLUSIONS ARE RARE. THIS PATTERN IS   SUSPICIOUS   FOR PAPILLARY CARCINOMA.     Past Medical History:   Diagnosis Date    Anxiety     Bilateral headaches 4/1/2016    Cirrhosis     Diabetes mellitus     type 2    Hepatomegaly Aug 2014    Morbidly obese     Obesity, Class III, BMI 40-49.9 (morbid obesity) 4/1/2016    Ovarian cancer 12/2012    grade 1 endometrioid adenoca of the ovary    Pancreatitis     Small bowel obstruction due to adhesions Dec 2015     Thyroid disease     hypothyroid    Thyroid nodule      Past Surgical History:   Procedure Laterality Date    ABDOMINAL ADHESION SURGERY      done at same time with ovarian cystectomy    CHOLECYSTECTOMY  dec 2015    HERNIA REPAIR  12/28/2015    25 x 20 cm Venrtralex ST mesh for repair done at time of [facility] for SBO.     HYSTERECTOMY  2 /25/2013    adeline rso. prior lso. ovarian ca    leep      positive margins    left salpingo oophrectomy  7/10    hemorrhagic cyst    OMENTECTOMY      partial right oophrectomy  12/12    LMP of ovary    MS REMOVAL OF OVARY/TUBE(S)      x lap  12/28/2015    post open cholecystectomy 2/21/2015.      Review of patient's  "allergies indicates:   Allergen Reactions    Carboplatin      Had to go to ER.     Tuberculin ppd Rash     localized     Medication List with Changes/Refills   Current Medications    ALBUTEROL (VENTOLIN HFA) 90 MCG/ACTUATION INHALER    Inhale 2 puffs into the lungs every 6 (six) hours as needed for Wheezing. Rescue    ATORVASTATIN (LIPITOR) 40 MG TABLET    Take 20 mg by mouth every evening.    BROMPHENIRAMINE-PSEUDOEPH-DM (BROMFED DM) 2-30-10 MG/5 ML SYRP    Take 10 mLs by mouth every 4 (four) hours as needed (cough, congestion).    CALCIPOTRIENE-BETAMETHASONE (TACLONEX) OINTMENT    Apply topically once daily.    HUMULIN R U-500, CONC, KWIKPEN 500 UNIT/ML (3 ML) INPN    INJECT 55 UNITS SUBCUTANEOUS BEFORE BREAKFAST, 65 UNITS BEFORE LUNCH & 78 UNITS BEFORE SUPPER    INSULIN NEEDLES, DISPOSABLE, 30 X 1/3 " NDLE    Uses 4 daily, on multiple daily insulin injections    METFORMIN (GLUCOPHAGE) 500 MG TABLET    Take 500 mg by mouth 2 (two) times daily.     OZEMPIC 2 MG/DOSE (8 MG/3 ML) PNIJ    Inject 2 mg into the skin every 7 days.    PROMETHAZINE-DEXTROMETHORPHAN (PROMETHAZINE-DM) 6.25-15 MG/5 ML SYRP    Take 5 mLs by mouth nightly as needed (nighttime cough).    SERTRALINE (ZOLOFT) 50 MG TABLET    TAKE 1 TABLET BY MOUTH EVERY DAY   Discontinued Medications    BALOXAVIR MARBOXIL (XOFLUZA) 40 MG TABLET    Take 2 tablets (80 mg total) by mouth once. Please run as Cash with coupon for 1 dose    KETOCONAZOLE (NIZORAL) 2 % SHAMPOO    Apply topically 3 (three) times a week. Lather into affected areas. Rinse off in 5-10 min. Repeat every other day.     Family History   Problem Relation Age of Onset    Diabetes Mother     Heart disease Mother     Stroke Mother     Hypertension Mother     Diabetes Father     Ovarian cancer Neg Hx     Uterine cancer Neg Hx     Breast cancer Neg Hx     Colon cancer Neg Hx      Social History     Socioeconomic History    Marital status: Other   Tobacco Use    Smoking status: Every Day     Packs/day: " 0.25     Years: 20.00     Pack years: 5.00     Types: Cigarettes    Smokeless tobacco: Never   Substance and Sexual Activity    Alcohol use: No    Drug use: No    Sexual activity: Not Currently     Partners: Male         Review of Systems   Constitutional:  Negative for appetite change, chills, fever and unexpected weight change.   HENT:  Negative for hearing loss, rhinorrhea, sore throat and voice change.    Eyes:  Negative for photophobia and visual disturbance.   Respiratory:  Negative for cough, choking and shortness of breath.    Cardiovascular:  Negative for chest pain, palpitations and leg swelling.   Gastrointestinal:  Negative for abdominal pain, blood in stool, constipation, diarrhea, nausea and vomiting.   Endocrine: Negative for cold intolerance, heat intolerance, polydipsia and polyuria.   Musculoskeletal:  Negative for arthralgias, back pain, joint swelling and neck stiffness.   Skin:  Negative for color change, pallor and rash.   Neurological:  Negative for dizziness, seizures, syncope and headaches.   Hematological:  Negative for adenopathy. Does not bruise/bleed easily.   Psychiatric/Behavioral:  Negative for agitation, behavioral problems and confusion.      Objective:      Physical Exam  Constitutional:       General: She is not in acute distress.     Appearance: Normal appearance. She is well-developed. She is not toxic-appearing.   HENT:      Head: Normocephalic and atraumatic. No abrasion or laceration.      Right Ear: External ear normal.      Left Ear: External ear normal.      Nose: Nose normal.   Eyes:      Pupils: Pupils are equal, round, and reactive to light.   Neck:      Thyroid: No thyroid mass or thyromegaly.      Trachea: Trachea and phonation normal. No tracheal deviation.   Cardiovascular:      Rate and Rhythm: Normal rate and regular rhythm.   Pulmonary:      Effort: Pulmonary effort is normal. No tachypnea, accessory muscle usage or respiratory distress.   Abdominal:       General: There is no distension.      Palpations: Abdomen is soft.      Tenderness: There is no abdominal tenderness.   Musculoskeletal:      Cervical back: Neck supple. Normal range of motion.   Lymphadenopathy:      Cervical: No cervical adenopathy.   Skin:     General: Skin is warm.   Neurological:      Mental Status: She is alert and oriented to person, place, and time.      Coordination: Coordination normal.      Gait: Gait normal.   Psychiatric:         Speech: Speech normal.         Behavior: Behavior normal.       Assessment/Plan:   Thyroid neoplasm  -     Full code; Standing  -     Vital signs; Standing  -     Insert peripheral IV; Standing  -     Diet NPO; Standing  -     Pulse Oximetry Q4H; Standing  -     Case Request Operating Room: THYROIDECTOMY  -     Place in Outpatient; Standing  -     Place sequential compression device; Standing  -     Basic metabolic panel; Future; Expected date: 11/10/2022  -     CBC auto differential; Future; Expected date: 11/10/2022  -     EKG 12-lead; Future  -     X-Ray Chest PA And Lateral; Future; Expected date: 11/10/2022    Other orders  -     ceFAZolin (ANCEF) 2 g in dextrose 5 % 50 mL IVPB    FNA of the 19 mm thyroid lesion has returned suspicious for papillary carcinoma.  We had long discussion about her case today.  Will proceed with total thyroidectomy November 16th.  Risks and benefits of the procedure discussed with the patient.      I discussed the proposed procedures with the patient including risks, benefits, indications, alternatives and special concerns.  The patient appears to understand and agrees to go ahead with surgery.  I have made no promises, warranties or verbal agreements beyond what was discussed above.    No follow-ups on file.

## 2022-11-15 ENCOUNTER — HOSPITAL ENCOUNTER (OUTPATIENT)
Dept: PREADMISSION TESTING | Facility: HOSPITAL | Age: 37
Discharge: HOME | DRG: 626 | End: 2022-11-15
Attending: SURGERY
Payer: COMMERCIAL

## 2022-11-15 ENCOUNTER — HOSPITAL ENCOUNTER (OUTPATIENT)
Dept: RADIOLOGY | Facility: HOSPITAL | Age: 37
Discharge: HOME | DRG: 626 | End: 2022-11-15
Attending: SURGERY
Payer: COMMERCIAL

## 2022-11-15 VITALS
TEMPERATURE: 99 F | WEIGHT: 293 LBS | HEIGHT: 67 IN | OXYGEN SATURATION: 97 % | RESPIRATION RATE: 16 BRPM | DIASTOLIC BLOOD PRESSURE: 74 MMHG | BODY MASS INDEX: 45.99 KG/M2 | SYSTOLIC BLOOD PRESSURE: 144 MMHG | HEART RATE: 86 BPM

## 2022-11-15 DIAGNOSIS — D49.7 THYROID NEOPLASM: ICD-10-CM

## 2022-11-15 DIAGNOSIS — R76.12 POSITIVE QUANTIFERON-TB GOLD TEST: ICD-10-CM

## 2022-11-15 PROCEDURE — 93010 ELECTROCARDIOGRAM REPORT: CPT | Mod: ,,, | Performed by: INTERNAL MEDICINE

## 2022-11-15 PROCEDURE — 71046 X-RAY EXAM CHEST 2 VIEWS: CPT | Mod: TC

## 2022-11-15 PROCEDURE — 93005 ELECTROCARDIOGRAM TRACING: CPT | Performed by: INTERNAL MEDICINE

## 2022-11-15 PROCEDURE — 93010 EKG 12-LEAD: ICD-10-PCS | Mod: ,,, | Performed by: INTERNAL MEDICINE

## 2022-11-16 ENCOUNTER — HOSPITAL ENCOUNTER (INPATIENT)
Facility: HOSPITAL | Age: 37
LOS: 1 days | Discharge: HOME | DRG: 626 | End: 2022-11-17
Attending: SURGERY | Admitting: SURGERY
Payer: COMMERCIAL

## 2022-11-16 ENCOUNTER — ANESTHESIA (OUTPATIENT)
Dept: SURGERY | Facility: HOSPITAL | Age: 37
DRG: 626 | End: 2022-11-16
Payer: COMMERCIAL

## 2022-11-16 ENCOUNTER — ANESTHESIA EVENT (OUTPATIENT)
Dept: SURGERY | Facility: HOSPITAL | Age: 37
DRG: 626 | End: 2022-11-16
Payer: COMMERCIAL

## 2022-11-16 DIAGNOSIS — D49.7 THYROID NEOPLASM: ICD-10-CM

## 2022-11-16 LAB
CALCIUM SERPL-MCNC: 9 MG/DL (ref 8.7–10.5)
GLUCOSE SERPL-MCNC: 170 MG/DL (ref 70–110)
GLUCOSE SERPL-MCNC: 192 MG/DL (ref 70–110)
GLUCOSE SERPL-MCNC: 209 MG/DL (ref 70–110)

## 2022-11-16 PROCEDURE — 63600175 PHARM REV CODE 636 W HCPCS: Performed by: SURGERY

## 2022-11-16 PROCEDURE — 94799 UNLISTED PULMONARY SVC/PX: CPT

## 2022-11-16 PROCEDURE — 99900035 HC TECH TIME PER 15 MIN (STAT)

## 2022-11-16 PROCEDURE — 27000221 HC OXYGEN, UP TO 24 HOURS

## 2022-11-16 PROCEDURE — 71000039 HC RECOVERY, EACH ADD'L HOUR: Performed by: SURGERY

## 2022-11-16 PROCEDURE — 25000003 PHARM REV CODE 250: Performed by: NURSE ANESTHETIST, CERTIFIED REGISTERED

## 2022-11-16 PROCEDURE — 94761 N-INVAS EAR/PLS OXIMETRY MLT: CPT

## 2022-11-16 PROCEDURE — 60240 REMOVAL OF THYROID: CPT | Mod: ,,, | Performed by: SURGERY

## 2022-11-16 PROCEDURE — 27201423 OPTIME MED/SURG SUP & DEVICES STERILE SUPPLY: Performed by: SURGERY

## 2022-11-16 PROCEDURE — 37000008 HC ANESTHESIA 1ST 15 MINUTES: Performed by: SURGERY

## 2022-11-16 PROCEDURE — 36000707: Performed by: SURGERY

## 2022-11-16 PROCEDURE — 25000003 PHARM REV CODE 250: Performed by: SURGERY

## 2022-11-16 PROCEDURE — 60240 PR THYROIDECTOMY: ICD-10-PCS | Mod: ,,, | Performed by: SURGERY

## 2022-11-16 PROCEDURE — 82962 GLUCOSE BLOOD TEST: CPT | Performed by: SURGERY

## 2022-11-16 PROCEDURE — 82310 ASSAY OF CALCIUM: CPT | Performed by: SURGERY

## 2022-11-16 PROCEDURE — 63600175 PHARM REV CODE 636 W HCPCS: Performed by: NURSE ANESTHETIST, CERTIFIED REGISTERED

## 2022-11-16 PROCEDURE — 63600175 PHARM REV CODE 636 W HCPCS: Performed by: ANESTHESIOLOGY

## 2022-11-16 PROCEDURE — 71000033 HC RECOVERY, INTIAL HOUR: Performed by: SURGERY

## 2022-11-16 PROCEDURE — 36000706: Performed by: SURGERY

## 2022-11-16 PROCEDURE — 36415 COLL VENOUS BLD VENIPUNCTURE: CPT | Performed by: SURGERY

## 2022-11-16 PROCEDURE — 37000009 HC ANESTHESIA EA ADD 15 MINS: Performed by: SURGERY

## 2022-11-16 PROCEDURE — 12000002 HC ACUTE/MED SURGE SEMI-PRIVATE ROOM

## 2022-11-16 PROCEDURE — C9290 INJ, BUPIVACAINE LIPOSOME: HCPCS | Performed by: SURGERY

## 2022-11-16 RX ORDER — KETAMINE HYDROCHLORIDE 50 MG/ML
INJECTION, SOLUTION INTRAMUSCULAR; INTRAVENOUS
Status: DISCONTINUED | OUTPATIENT
Start: 2022-11-16 | End: 2022-11-16

## 2022-11-16 RX ORDER — MIDAZOLAM HYDROCHLORIDE 5 MG/ML
INJECTION INTRAMUSCULAR; INTRAVENOUS
Status: DISCONTINUED | OUTPATIENT
Start: 2022-11-16 | End: 2022-11-16

## 2022-11-16 RX ORDER — FENTANYL CITRATE 50 UG/ML
INJECTION, SOLUTION INTRAMUSCULAR; INTRAVENOUS
Status: DISCONTINUED | OUTPATIENT
Start: 2022-11-16 | End: 2022-11-16

## 2022-11-16 RX ORDER — PROMETHAZINE HYDROCHLORIDE 25 MG/1
25 TABLET ORAL EVERY 6 HOURS PRN
Status: DISCONTINUED | OUTPATIENT
Start: 2022-11-16 | End: 2022-11-17 | Stop reason: HOSPADM

## 2022-11-16 RX ORDER — OXYCODONE HYDROCHLORIDE 5 MG/1
5 TABLET ORAL
Status: DISCONTINUED | OUTPATIENT
Start: 2022-11-16 | End: 2022-11-16 | Stop reason: HOSPADM

## 2022-11-16 RX ORDER — MEPERIDINE HYDROCHLORIDE 50 MG/ML
12.5 INJECTION INTRAMUSCULAR; INTRAVENOUS; SUBCUTANEOUS EVERY 10 MIN PRN
Status: DISCONTINUED | OUTPATIENT
Start: 2022-11-16 | End: 2022-11-16 | Stop reason: HOSPADM

## 2022-11-16 RX ORDER — ENOXAPARIN SODIUM 100 MG/ML
40 INJECTION SUBCUTANEOUS EVERY 24 HOURS
Status: DISCONTINUED | OUTPATIENT
Start: 2022-11-16 | End: 2022-11-16

## 2022-11-16 RX ORDER — DIPHENHYDRAMINE HYDROCHLORIDE 50 MG/ML
INJECTION INTRAMUSCULAR; INTRAVENOUS
Status: DISCONTINUED | OUTPATIENT
Start: 2022-11-16 | End: 2022-11-16

## 2022-11-16 RX ORDER — ONDANSETRON 4 MG/1
8 TABLET, ORALLY DISINTEGRATING ORAL EVERY 6 HOURS PRN
Status: DISCONTINUED | OUTPATIENT
Start: 2022-11-16 | End: 2022-11-17 | Stop reason: HOSPADM

## 2022-11-16 RX ORDER — LIDOCAINE HCL/PF 100 MG/5ML
SYRINGE (ML) INTRAVENOUS
Status: DISCONTINUED | OUTPATIENT
Start: 2022-11-16 | End: 2022-11-16

## 2022-11-16 RX ORDER — SUCCINYLCHOLINE CHLORIDE 20 MG/ML
INJECTION INTRAMUSCULAR; INTRAVENOUS
Status: DISCONTINUED | OUTPATIENT
Start: 2022-11-16 | End: 2022-11-16

## 2022-11-16 RX ORDER — HYDROMORPHONE HYDROCHLORIDE 1 MG/ML
1 INJECTION, SOLUTION INTRAMUSCULAR; INTRAVENOUS; SUBCUTANEOUS EVERY 4 HOURS PRN
Status: DISCONTINUED | OUTPATIENT
Start: 2022-11-16 | End: 2022-11-17 | Stop reason: HOSPADM

## 2022-11-16 RX ORDER — PROPOFOL 10 MG/ML
VIAL (ML) INTRAVENOUS
Status: DISCONTINUED | OUTPATIENT
Start: 2022-11-16 | End: 2022-11-16

## 2022-11-16 RX ORDER — DIPHENHYDRAMINE HYDROCHLORIDE 50 MG/ML
12.5 INJECTION INTRAMUSCULAR; INTRAVENOUS
Status: DISCONTINUED | OUTPATIENT
Start: 2022-11-16 | End: 2022-11-16 | Stop reason: HOSPADM

## 2022-11-16 RX ORDER — ONDANSETRON 2 MG/ML
4 INJECTION INTRAMUSCULAR; INTRAVENOUS DAILY PRN
Status: DISCONTINUED | OUTPATIENT
Start: 2022-11-16 | End: 2022-11-16 | Stop reason: HOSPADM

## 2022-11-16 RX ORDER — CEFAZOLIN SODIUM 2 G/50ML
2 SOLUTION INTRAVENOUS
Status: DISPENSED | OUTPATIENT
Start: 2022-11-16 | End: 2022-11-17

## 2022-11-16 RX ORDER — FENTANYL CITRATE 50 UG/ML
25 INJECTION, SOLUTION INTRAMUSCULAR; INTRAVENOUS EVERY 5 MIN PRN
Status: DISCONTINUED | OUTPATIENT
Start: 2022-11-16 | End: 2022-11-16 | Stop reason: HOSPADM

## 2022-11-16 RX ORDER — ROCURONIUM BROMIDE 10 MG/ML
INJECTION, SOLUTION INTRAVENOUS
Status: DISCONTINUED | OUTPATIENT
Start: 2022-11-16 | End: 2022-11-16

## 2022-11-16 RX ORDER — DEXAMETHASONE SODIUM PHOSPHATE 4 MG/ML
INJECTION, SOLUTION INTRA-ARTICULAR; INTRALESIONAL; INTRAMUSCULAR; INTRAVENOUS; SOFT TISSUE
Status: DISCONTINUED | OUTPATIENT
Start: 2022-11-16 | End: 2022-11-16

## 2022-11-16 RX ORDER — BUPIVACAINE HYDROCHLORIDE AND EPINEPHRINE 5; 5 MG/ML; UG/ML
INJECTION, SOLUTION EPIDURAL; INTRACAUDAL; PERINEURAL
Status: DISCONTINUED | OUTPATIENT
Start: 2022-11-16 | End: 2022-11-16 | Stop reason: HOSPADM

## 2022-11-16 RX ORDER — CEFAZOLIN SODIUM 2 G/50ML
2 SOLUTION INTRAVENOUS
Status: DISCONTINUED | OUTPATIENT
Start: 2022-11-16 | End: 2022-11-16 | Stop reason: HOSPADM

## 2022-11-16 RX ORDER — MUPIROCIN 20 MG/G
1 OINTMENT TOPICAL 2 TIMES DAILY
Status: DISCONTINUED | OUTPATIENT
Start: 2022-11-16 | End: 2022-11-17 | Stop reason: HOSPADM

## 2022-11-16 RX ORDER — NALOXONE HCL 0.4 MG/ML
0.4 VIAL (ML) INJECTION ONCE
Status: DISCONTINUED | OUTPATIENT
Start: 2022-11-16 | End: 2022-11-17 | Stop reason: HOSPADM

## 2022-11-16 RX ORDER — FAMOTIDINE 10 MG/ML
INJECTION INTRAVENOUS
Status: DISCONTINUED | OUTPATIENT
Start: 2022-11-16 | End: 2022-11-16

## 2022-11-16 RX ORDER — HYDROCODONE BITARTRATE AND ACETAMINOPHEN 5; 325 MG/1; MG/1
1 TABLET ORAL EVERY 4 HOURS PRN
Status: DISCONTINUED | OUTPATIENT
Start: 2022-11-16 | End: 2022-11-17 | Stop reason: HOSPADM

## 2022-11-16 RX ORDER — ENOXAPARIN SODIUM 100 MG/ML
40 INJECTION SUBCUTANEOUS
Status: DISCONTINUED | OUTPATIENT
Start: 2022-11-16 | End: 2022-11-17 | Stop reason: HOSPADM

## 2022-11-16 RX ORDER — ONDANSETRON 2 MG/ML
INJECTION INTRAMUSCULAR; INTRAVENOUS
Status: DISCONTINUED | OUTPATIENT
Start: 2022-11-16 | End: 2022-11-16

## 2022-11-16 RX ADMIN — KETAMINE HYDROCHLORIDE 25 MG: 50 INJECTION INTRAMUSCULAR; INTRAVENOUS at 09:11

## 2022-11-16 RX ADMIN — KETAMINE HYDROCHLORIDE 25 MG: 50 INJECTION INTRAMUSCULAR; INTRAVENOUS at 07:11

## 2022-11-16 RX ADMIN — ROCURONIUM BROMIDE 40 MG: 10 INJECTION, SOLUTION INTRAVENOUS at 07:11

## 2022-11-16 RX ADMIN — MUPIROCIN 1 G: 20 OINTMENT TOPICAL at 08:11

## 2022-11-16 RX ADMIN — SUGAMMADEX 200 MG: 100 INJECTION, SOLUTION INTRAVENOUS at 10:11

## 2022-11-16 RX ADMIN — HYDROCODONE BITARTRATE AND ACETAMINOPHEN 1 TABLET: 5; 325 TABLET ORAL at 08:11

## 2022-11-16 RX ADMIN — FAMOTIDINE 20 MG: 10 INJECTION, SOLUTION INTRAVENOUS at 08:11

## 2022-11-16 RX ADMIN — Medication 120 MG: at 07:11

## 2022-11-16 RX ADMIN — DIPHENHYDRAMINE HYDROCHLORIDE 12.5 MG: 50 INJECTION INTRAMUSCULAR; INTRAVENOUS at 08:11

## 2022-11-16 RX ADMIN — LIDOCAINE HYDROCHLORIDE 60 MG: 20 INJECTION, SOLUTION INTRAVENOUS at 07:11

## 2022-11-16 RX ADMIN — PROPOFOL 180 MG: 10 INJECTION, EMULSION INTRAVENOUS at 07:11

## 2022-11-16 RX ADMIN — ROCURONIUM BROMIDE 5 MG: 10 INJECTION, SOLUTION INTRAVENOUS at 07:11

## 2022-11-16 RX ADMIN — FENTANYL CITRATE 25 MCG: 50 INJECTION, SOLUTION INTRAMUSCULAR; INTRAVENOUS at 12:11

## 2022-11-16 RX ADMIN — DEXAMETHASONE SODIUM PHOSPHATE 8 MG: 4 INJECTION, SOLUTION INTRAMUSCULAR; INTRAVENOUS at 07:11

## 2022-11-16 RX ADMIN — MIDAZOLAM HYDROCHLORIDE 2 MG: 5 INJECTION, SOLUTION INTRAMUSCULAR; INTRAVENOUS at 07:11

## 2022-11-16 RX ADMIN — DEXTROSE 2 G: 50 INJECTION, SOLUTION INTRAVENOUS at 09:11

## 2022-11-16 RX ADMIN — FENTANYL CITRATE 25 MCG: 50 INJECTION, SOLUTION INTRAMUSCULAR; INTRAVENOUS at 11:11

## 2022-11-16 RX ADMIN — FENTANYL CITRATE 100 MCG: 50 INJECTION INTRAMUSCULAR; INTRAVENOUS at 07:11

## 2022-11-16 RX ADMIN — SODIUM CHLORIDE, SODIUM LACTATE, POTASSIUM CHLORIDE, AND CALCIUM CHLORIDE: .6; .31; .03; .02 INJECTION, SOLUTION INTRAVENOUS at 07:11

## 2022-11-16 RX ADMIN — HYDROCODONE BITARTRATE AND ACETAMINOPHEN 1 TABLET: 5; 325 TABLET ORAL at 04:11

## 2022-11-16 RX ADMIN — ONDANSETRON 4 MG: 2 INJECTION INTRAMUSCULAR; INTRAVENOUS at 07:11

## 2022-11-16 NOTE — ADDENDUM NOTE
Addendum  created 11/16/22 1237 by Nhan Matamoros MD    Order list changed, Pharmacy for encounter modified

## 2022-11-16 NOTE — ANESTHESIA PREPROCEDURE EVALUATION
11/16/2022  Marguerite Colon is a 37 y.o., female.      Pre-op Assessment    I have reviewed the Patient Summary Reports.     I have reviewed the Nursing Notes. I have reviewed the NPO Status.   I have reviewed the Medications.     Review of Systems  Anesthesia Hx:  No problems with previous Anesthesia Denies Hx of Anesthetic complications  Neg history of prior surgery. Denies Family Hx of Anesthesia complications.   Denies Personal Hx of Anesthesia complications.   Social:  No Alcohol Use, Smoker    Hematology/Oncology:     Oncology Normal    -- Anemia: Oncology Comments: Ovarian cancer     EENT/Dental:EENT/Dental Normal   Cardiovascular:  Cardiovascular Normal     Pulmonary:   Asthma Sleep Apnea, CPAP Treated for TB recently, Pt states latent disease   Education provided regarding risk of obstructive sleep apnea     Renal/:  Renal/ Normal     Hepatic/GI:   Liver disease: Cirrhosis.    Musculoskeletal:  Spine Disorders: lumbar    Neurological:   Headaches    Endocrine:   Diabetes, type 2 Hypothyroidism  Obesity / BMI > 30  Dermatological:  Skin Normal    Psych:   anxiety          Patient Active Problem List   Diagnosis    Ovarian cancer    Type II or unspecified type diabetes mellitus without mention of complication, uncontrolled    Morbid obesity    Family history of diabetes mellitus    Myalgia    Knee pain, bilateral    Hepatomegaly    Cigarette smoker    Small bowel obstruction due to adhesions    Obesity, Class III, BMI 40-49.9 (morbid obesity)    Bilateral headaches    Gait abnormality    Posture abnormality    Hx of ovarian cancer    Hypoactive thyroid    Thyroid nodule    Reactive airway disease    Obstructive sleep apnea syndrome in adult    Mixed hyperlipidemia    Heterozygous alpha 1-antitrypsin deficiency    Fibrosis of liver    Positive QuantiFERON-TB Gold test       Past  Surgical History:   Procedure Laterality Date    ABDOMINAL ADHESION SURGERY      done at same time with ovarian cystectomy    CHOLECYSTECTOMY  12/2015    HERNIA REPAIR  12/28/2015    25 x 20 cm Venrtralex ST mesh for repair done at time of [facility] for SBO.     HYSTERECTOMY  02/25/2013    adeline rso. prior lso. ovarian ca    leep      positive margins    left salpingo oophrectomy  07/2010    hemorrhagic cyst    OMENTECTOMY      partial right oophrectomy  12/2012    LMP of ovary    NM REMOVAL OF OVARY/TUBE(S)      x lap  12/28/2015    post open cholecystectomy 2/21/2015.         Tobacco Use:  The patient  reports that she has been smoking cigarettes. She has a 5.00 pack-year smoking history. She has never used smokeless tobacco.               Lab Results   Component Value Date    WBC 8.23 11/15/2022    HGB 13.4 11/15/2022    HCT 42.8 11/15/2022    MCV 92 11/15/2022     (L) 11/15/2022     BMP  Lab Results   Component Value Date     11/15/2022    K 3.5 11/15/2022     11/15/2022    CO2 30 (H) 11/15/2022    BUN 14 11/15/2022    CREATININE 0.7 11/15/2022    CALCIUM 9.2 11/15/2022    ANIONGAP 6 (L) 11/15/2022    GLU 91 11/15/2022     (H) 07/08/2022     (H) 06/07/2022       No results found for this or any previous visit.          Physical Exam  General: Well nourished and Alert    Airway:  Mallampati: III / II  Mouth Opening: Normal  TM Distance: Normal  Tongue: Normal  Neck ROM: Normal ROM    Dental:  Intact    Chest/Lungs:  Clear to auscultation, Normal Respiratory Rate    Heart:  Rate: Normal  Rhythm: Regular Rhythm  Sounds: Normal        Anesthesia Plan  Type of Anesthesia, risks & benefits discussed:    Anesthesia Type: Gen ETT  Intra-op Monitoring Plan: Standard ASA Monitors  Post Op Pain Control Plan: multimodal analgesia  Induction:  IV  Airway Plan: Video, Post-Induction  Informed Consent: Informed consent signed with the Patient and all parties understand the risks and agree  with anesthesia plan.  All questions answered. Patient consented to blood products? Yes  ASA Score: 3  Anesthesia Plan Notes: Ketamine  Zofran, Pepcid, Benadryl 6.25 mg  ORI precaution    Ready For Surgery From Anesthesia Perspective.     .

## 2022-11-16 NOTE — ANESTHESIA POSTPROCEDURE EVALUATION
Anesthesia Post Evaluation    Patient: Marguerite Colon    Procedure(s) Performed: Procedure(s) (LRB):  THYROIDECTOMY (Bilateral)    Final Anesthesia Type: general      Patient location during evaluation: PACU  Patient participation: Yes- Able to Participate  Level of consciousness: awake and alert  Post-procedure vital signs: reviewed and stable  Pain management: adequate  Airway patency: patent  ORI mitigation strategies: Extubation while patient is awake, Multimodal analgesia and Extubation and recovery carried out in lateral, semiupright, or other nonsupine position  PONV status at discharge: No PONV  Anesthetic complications: no      Cardiovascular status: stable  Respiratory status: unassisted and spontaneous ventilation  Hydration status: euvolemic  Follow-up not needed.          Vitals Value Taken Time   /71 11/16/22 1230   Temp 36.7 °C (98.1 °F) 11/16/22 1055   Pulse 65 11/16/22 1232   Resp 20 11/16/22 1232   SpO2 96 % 11/16/22 1232   Vitals shown include unvalidated device data.      No case tracking events are documented in the log.      Pain/Bradley Score: Pain Rating Prior to Med Admin: 7 (11/16/2022 11:37 AM)  Bradley Score: 9 (11/16/2022 12:15 PM)

## 2022-11-16 NOTE — PROGRESS NOTES
Renal Dosing of Medication    An order has been entered by a pharmacist to adjust the dose and/or frequency of the medication listed below based on the patient's renal function.    Objective:  37 y.o., female, Actual Body Weight = (!) 141.4 kg (311 lb 11.7 oz)    Current Regimen:  ENOXAPARIN 40 MG DAILY SQ.    Assessment:  Estimated Creatinine Clearance: 159.3 mL/min (based on SCr of 0.7 mg/dL).  Renal function is GOOD.    Plan:  Orders have been entered to adjust the dose and/or frequency based on the patient's weight and renal function:  ENOXAPARIN 40 MGEVERY 12 HOURS SQ    Thank you for allowing us to participate in this patient's care.     Luis Eduardo Solano 11/16/2022 4:05 PM  Department of Pharmacy  Ext [phone]

## 2022-11-16 NOTE — TRANSFER OF CARE
"Anesthesia Transfer of Care Note    Patient: Marguerite Colon    Procedure(s) Performed: Procedure(s) (LRB):  THYROIDECTOMY (Bilateral)    Patient location: PACU    Anesthesia Type: general    Transport from OR: Transported from OR on room air with adequate spontaneous ventilation    Post pain: adequate analgesia    Post assessment: no apparent anesthetic complications    Post vital signs: stable    Level of consciousness: awake, alert and oriented    Nausea/Vomiting: no nausea/vomiting    Complications: none    Transfer of care protocol was followed      Last vitals:   Visit Vitals  BP (!) 114/56   Pulse 68   Temp 36.7 °C (98 °F) (Oral)   Resp 18   Ht 5' 7" (1.702 m)   Wt (!) 141.4 kg (311 lb 11.7 oz)   LMP 02/11/2013   SpO2 96%   Breastfeeding No   BMI 48.82 kg/m²     "

## 2022-11-17 VITALS
RESPIRATION RATE: 18 BRPM | HEIGHT: 67 IN | WEIGHT: 293 LBS | HEART RATE: 51 BPM | DIASTOLIC BLOOD PRESSURE: 78 MMHG | TEMPERATURE: 98 F | OXYGEN SATURATION: 97 % | BODY MASS INDEX: 45.99 KG/M2 | SYSTOLIC BLOOD PRESSURE: 126 MMHG

## 2022-11-17 PROBLEM — D49.7 THYROID NEOPLASM: Status: ACTIVE | Noted: 2022-11-17

## 2022-11-17 LAB
CALCIUM SERPL-MCNC: 8.5 MG/DL (ref 8.7–10.5)
CALCIUM SERPL-MCNC: 8.6 MG/DL (ref 8.7–10.5)
CALCIUM SERPL-MCNC: 8.6 MG/DL (ref 8.7–10.5)
GLUCOSE SERPL-MCNC: 115 MG/DL (ref 70–110)
GLUCOSE SERPL-MCNC: 131 MG/DL (ref 70–110)
GLUCOSE SERPL-MCNC: 95 MG/DL (ref 70–110)

## 2022-11-17 PROCEDURE — 99900035 HC TECH TIME PER 15 MIN (STAT)

## 2022-11-17 PROCEDURE — 25000003 PHARM REV CODE 250: Performed by: SURGERY

## 2022-11-17 PROCEDURE — 94799 UNLISTED PULMONARY SVC/PX: CPT

## 2022-11-17 PROCEDURE — 36415 COLL VENOUS BLD VENIPUNCTURE: CPT | Performed by: SURGERY

## 2022-11-17 PROCEDURE — 94761 N-INVAS EAR/PLS OXIMETRY MLT: CPT

## 2022-11-17 PROCEDURE — 63600175 PHARM REV CODE 636 W HCPCS: Performed by: SURGERY

## 2022-11-17 PROCEDURE — 82310 ASSAY OF CALCIUM: CPT | Performed by: SURGERY

## 2022-11-17 PROCEDURE — 82310 ASSAY OF CALCIUM: CPT | Mod: 91 | Performed by: SURGERY

## 2022-11-17 PROCEDURE — 99900031 HC PATIENT EDUCATION (STAT)

## 2022-11-17 RX ORDER — HYDROCODONE BITARTRATE AND ACETAMINOPHEN 10; 325 MG/1; MG/1
1 TABLET ORAL EVERY 6 HOURS PRN
Qty: 25 TABLET | Refills: 0 | Status: SHIPPED | OUTPATIENT
Start: 2022-11-17 | End: 2022-11-29 | Stop reason: SDUPTHER

## 2022-11-17 RX ADMIN — HYDROCODONE BITARTRATE AND ACETAMINOPHEN 1 TABLET: 5; 325 TABLET ORAL at 02:11

## 2022-11-17 RX ADMIN — DEXTROSE 2 G: 50 INJECTION, SOLUTION INTRAVENOUS at 04:11

## 2022-11-17 RX ADMIN — ENOXAPARIN SODIUM 40 MG: 100 INJECTION SUBCUTANEOUS at 04:11

## 2022-11-17 RX ADMIN — MUPIROCIN 1 G: 20 OINTMENT TOPICAL at 08:11

## 2022-11-17 RX ADMIN — HYDROCODONE BITARTRATE AND ACETAMINOPHEN 1 TABLET: 5; 325 TABLET ORAL at 04:11

## 2022-11-17 RX ADMIN — HYDROCODONE BITARTRATE AND ACETAMINOPHEN 1 TABLET: 5; 325 TABLET ORAL at 08:11

## 2022-11-17 NOTE — PLAN OF CARE
Problem: Adult Inpatient Plan of Care  Goal: Plan of Care Review  Outcome: Ongoing, Progressing  Goal: Patient-Specific Goal (Individualized)  Outcome: Ongoing, Progressing  Goal: Absence of Hospital-Acquired Illness or Injury  Outcome: Ongoing, Progressing  Goal: Optimal Comfort and Wellbeing  Outcome: Ongoing, Progressing  Goal: Readiness for Transition of Care  Outcome: Ongoing, Progressing     Problem: Diabetes Comorbidity  Goal: Blood Glucose Level Within Targeted Range  Outcome: Ongoing, Progressing     Problem: Bariatric Environmental Safety  Goal: Safety Maintained with Care  Outcome: Ongoing, Progressing     Problem: Fall Injury Risk  Goal: Absence of Fall and Fall-Related Injury  Outcome: Ongoing, Progressing

## 2022-11-17 NOTE — BRIEF OP NOTE
[facility]  Brief Operative Note    SUMMARY     Surgery Date: 11/16/2022     Surgeon(s) and Role:     * Hugh Toussaint III, MD - Primary    Assisting Surgeon: None    Pre-op Diagnosis:  Thyroid neoplasm left lobe [D49.7]    Post-op Diagnosis:  Post-Op Diagnosis Codes:     * Thyroid neoplasm [D49.7] left lobe     Procedure(s) (LRB):  THYROIDECTOMY (Bilateral)    Anesthesia: General    Operative Findings: Patient was taken the operating room and transferred to the operating room table in supine position. She was given general anesthesia and intubated. She was positioned with the neck slightly extended. The patient's neck sterilely prepped and draped. A collar incision was made about 2 fingerbreadths above the clavicular head. Dissection was carried down through the skin and subcutaneous tissue. The platysma was entered. Subplatysmal flaps were raised superiorly and inferiorly. The strap muscles were encountered. The median raphae was entered. Dissection was then carried down to the surface of the thyroid. The strap muscles on the right dissected off the thyroid gland and retracted laterally. The right thyroid gland was rolled medially mobilizing the right thyroid lobe. The middle thyroid vein was located and was taken using LigaSure device. Once the gland was rotated more medially the upper and lower parathyroid glands were located as well as recurrent laryngeal nerve. Return attention to the upper pole. Vessels of the upper pole were divided using the Voyant. Then turned my attention to the lower pole the lower pole vessels were also divided with Voyant. The gland was then sharply dissected off the anterior surface of the trachea to the midline. We then turned attention to the left lobe.     The strap muscles on the left dissected off the thyroid gland and retracted laterally. The left thyroid gland was rolled medially. The upper pole was very firm. The middle thyroid vein was located and was taken  using the Voyant device. Once the gland was rotated more medially the upper and lower parathyroid glands were located as well as recurrent laryngeal nerve. Return attention to the upper pole. Vessels of the upper pole were divided using the Voyant. This was difficult as the nodule was fairly adhered to the surrounding muscle and tissue. Some muscle fibers were excised with the gland. Then turned my attention to the lower pole the lower pole vessels were also divided with Voyant. The gland was then sharply dissected off the anterior surface of the trachea to the midline. This excised and freed the gland.      The gland was submitted to pathology. Hemostasis was achieved. A sheet of Surgicel was placed in the left and the right neck. There was no evidence of bleeding. The strap muscles were reapproximated using interrupted Vicryl suture. The platysma was reapproximated using interrupted 4-0 vicryl suture. The skin was closed using 4-0 Monocryl running subcuticular suture. Incision was bandaged. She was extubated. She was brought to the recovery room in stable condition. All lap and instrument counts were correct   Complications none   Estimated Blood Loss: 20 mL    Estimated Blood Loss has been documented.         Specimens:   Specimen (24h ago, onward)       Start     Ordered    11/16/22 1015  Specimen to Pathology - Surgery  Once        Comments: Pre-op Diagnosis: Thyroid neoplasm [D49.7]Procedure(s):THYROIDECTOMY Number of specimens: 1Name of specimens: 1. Thyroid, short-left upper, long- left lower     Question:  Release to patient  Answer:  Immediate    11/16/22 1014                    [id number]

## 2022-11-17 NOTE — PLAN OF CARE
11/17/22 0820   Patient Assessment/Suction   Level of Consciousness (AVPU) alert   Respiratory Effort Normal;Unlabored   PRE-TX-O2   O2 Device (Oxygen Therapy) room air   SpO2 95 %   Pulse Oximetry Type Intermittent   $ Pulse Oximetry - Multiple Charge Pulse Oximetry - Multiple   Pulse 60   Resp 18   Incentive Spirometer   $ Incentive Spirometer Charges done with encouragement;postop instruction   Administration (IS) instruction provided, follow-up;mouthpiece utilized;self-administered   Number of Repetitions (IS) 5   Level Incentive Spirometer (mL) 1000   Patient Tolerance (IS) good;no adverse signs/symptoms present   Education   $ Education 15 min   Respiratory Evaluation   $ Care Plan Tech Time 15 min     Has own home cpap at bedside. Able to do IS independently.

## 2022-11-17 NOTE — PLAN OF CARE
Problem: Adult Inpatient Plan of Care  Goal: Plan of Care Review  Outcome: Ongoing, Progressing  Goal: Patient-Specific Goal (Individualized)  Outcome: Ongoing, Progressing  Goal: Absence of Hospital-Acquired Illness or Injury  Outcome: Ongoing, Progressing  Goal: Optimal Comfort and Wellbeing  Outcome: Ongoing, Progressing  Goal: Readiness for Transition of Care  Outcome: Ongoing, Progressing     Problem: Diabetes Comorbidity  Goal: Blood Glucose Level Within Targeted Range  Outcome: Ongoing, Progressing     Problem: Bariatric Environmental Safety  Goal: Safety Maintained with Care  Outcome: Ongoing, Progressing     Problem: Fall Injury Risk  Goal: Absence of Fall and Fall-Related Injury  Outcome: Ongoing, Progressing     Problem: Skin Injury Risk Increased  Goal: Skin Health and Integrity  Outcome: Ongoing, Progressing

## 2022-11-17 NOTE — DISCHARGE SUMMARY
[facility]  General Surgery  Discharge Summary      Patient Name: Marguerite Colon  MRN: [id number]  Admission Date: 11/16/2022  Hospital Length of Stay: 1 days  Discharge Date and Time:  11/17/2022 5:17 PM  Attending Physician: Hugh Toussaint III, *   Discharging Provider: Hugh Toussaint III, MD  Primary Care Provider: ABDIRASHID Santos     HPI: 37 year old female referred to the office with a bethesda category 5 FNA of a 19 mm left thyroid nodule. She has a known 19 mm left thyuoroid nodule. It had nben stable in size but never biopsied. FNA returend suspicious for papillary carcinoma. She has no compressive symtoms. She has no constitutional symptoms.  No family history of thyroid cancer.  She has personal history of ovarian cancer 10 years ago.  No history of previous neck surgery or radiation. She has history of liver cirrhosis.      : LEFT THYROID   SUSPICIOUS FOR MALIGNANCY.   BETHESDA CATEGORY V. SUSPICIOUS FOR PAPILLARY CARCINOMA. SPECIMEN   CONSISTS OF FOLLICULAR CELLS WITH NUCLEAR ENLARGEMENT, NUCLEAR PALLOR   WITH   GROOVES. INTRANUCLEAR PSEUDOINCLUSIONS ARE RARE. THIS PATTERN IS   SUSPICIOUS   FOR PAPILLARY CARCINOMA.     Procedure(s) (LRB):  THYROIDECTOMY (Bilateral)     Hospital Course:  Patient admitted through day surgery for scheduled total thyroidectomy.  Procedure performed without immediate complication.  She was admitted to the floor postoperatively.  Patient feeling well today.  Voice is fairly strong, somewhat hoarse.  She is breathing comfortably.  She is swallowing comfortably.  No evidence of hematoma on exam.  Calcium levels were drawn every 8 hours.  Initial was 9.0, 2nd was 8.6 in the last was 8.5.  We discussed starting calcium supplement at a dose of 2400 units every day. I am comfortable with discharge today with repeat calcium drawn this Monday.  Follow up with me next week.  Pathology is still pending.     Pending Diagnostic Studies:       Procedure Component  "Value Units Date/Time    Specimen to Pathology - Surgery [[id number]] Collected: 11/16/22 1009    Order Status: Sent Lab Status: No result     Specimen: Tissue           Final Active Diagnoses:    Diagnosis Date Noted POA    PRINCIPAL PROBLEM:  Thyroid neoplasm [D49.7] 11/17/2022 Yes      Problems Resolved During this Admission:      Discharged Condition: stable    Disposition: Home or Self Care    Follow Up:    Patient Instructions:      Diet Adult Regular     Lifting restrictions   Order Comments: No lifting over twenty pounds for six weeks     Remove dressing in 48 hours     Medications:  Reconciled Home Medications:      Medication List        START taking these medications      HYDROcodone-acetaminophen  mg per tablet  Commonly known as: NORCO  Take 1 tablet by mouth every 6 (six) hours as needed for Pain.            CONTINUE taking these medications      albuterol 90 mcg/actuation inhaler  Commonly known as: VENTOLIN HFA  Inhale 2 puffs into the lungs every 6 (six) hours as needed for Wheezing. Rescue     atorvastatin 40 MG tablet  Commonly known as: LIPITOR  Take 20 mg by mouth every evening.     brompheniramine-pseudoeph-DM 2-30-10 mg/5 mL Syrp  Commonly known as: BROMFED DM  Take 10 mLs by mouth every 4 (four) hours as needed (cough, congestion).     HumuLIN R U-500 (Conc) Kwikpen 500 unit/mL (3 mL) insulin pen  Generic drug: insulin regular hum U-500 conc  INJECT 55 UNITS SUBCUTANEOUS BEFORE BREAKFAST, 65 UNITS BEFORE LUNCH & 78 UNITS BEFORE SUPPER     metFORMIN 500 MG tablet  Commonly known as: GLUCOPHAGE  Take 500 mg by mouth 2 (two) times daily.     OZEMPIC 2 mg/dose (8 mg/3 mL) Pnij  Generic drug: semaglutide  Inject 2 mg into the skin every 7 days.     pen needle, diabetic 30 gauge x 1/3" Ndle  Uses 4 daily, on multiple daily insulin injections     sertraline 50 MG tablet  Commonly known as: ZOLOFT  TAKE 1 TABLET BY MOUTH EVERY DAY            ASK your doctor about these medications  "     calcipotriene-betamethasone ointment  Commonly known as: TACLONEX  Apply topically once daily.              Hugh Toussaint III, MD  General Surgery  [facility]

## 2022-11-21 ENCOUNTER — PATIENT MESSAGE (OUTPATIENT)
Dept: SURGERY | Facility: CLINIC | Age: 37
End: 2022-11-21
Payer: COMMERCIAL

## 2022-11-29 ENCOUNTER — OFFICE VISIT (OUTPATIENT)
Dept: SURGERY | Facility: CLINIC | Age: 37
End: 2022-11-29
Payer: COMMERCIAL

## 2022-11-29 VITALS
WEIGHT: 293 LBS | HEIGHT: 67 IN | TEMPERATURE: 97 F | RESPIRATION RATE: 18 BRPM | DIASTOLIC BLOOD PRESSURE: 83 MMHG | SYSTOLIC BLOOD PRESSURE: 130 MMHG | BODY MASS INDEX: 45.99 KG/M2 | HEART RATE: 74 BPM

## 2022-11-29 DIAGNOSIS — C73 PRIMARY THYROID CANCER: Primary | ICD-10-CM

## 2022-11-29 PROCEDURE — 3079F DIAST BP 80-89 MM HG: CPT | Mod: CPTII,S$GLB,, | Performed by: SURGERY

## 2022-11-29 PROCEDURE — 3075F SYST BP GE 130 - 139MM HG: CPT | Mod: CPTII,S$GLB,, | Performed by: SURGERY

## 2022-11-29 PROCEDURE — 99024 PR POST-OP FOLLOW-UP VISIT: ICD-10-PCS | Mod: S$GLB,,, | Performed by: SURGERY

## 2022-11-29 PROCEDURE — 1160F PR REVIEW ALL MEDS BY PRESCRIBER/CLIN PHARMACIST DOCUMENTED: ICD-10-PCS | Mod: CPTII,S$GLB,, | Performed by: SURGERY

## 2022-11-29 PROCEDURE — 99024 POSTOP FOLLOW-UP VISIT: CPT | Mod: S$GLB,,, | Performed by: SURGERY

## 2022-11-29 PROCEDURE — 3008F PR BODY MASS INDEX (BMI) DOCUMENTED: ICD-10-PCS | Mod: CPTII,S$GLB,, | Performed by: SURGERY

## 2022-11-29 PROCEDURE — 1160F RVW MEDS BY RX/DR IN RCRD: CPT | Mod: CPTII,S$GLB,, | Performed by: SURGERY

## 2022-11-29 PROCEDURE — 3075F PR MOST RECENT SYSTOLIC BLOOD PRESS GE 130-139MM HG: ICD-10-PCS | Mod: CPTII,S$GLB,, | Performed by: SURGERY

## 2022-11-29 PROCEDURE — 3008F BODY MASS INDEX DOCD: CPT | Mod: CPTII,S$GLB,, | Performed by: SURGERY

## 2022-11-29 PROCEDURE — 3079F PR MOST RECENT DIASTOLIC BLOOD PRESSURE 80-89 MM HG: ICD-10-PCS | Mod: CPTII,S$GLB,, | Performed by: SURGERY

## 2022-11-29 PROCEDURE — 1159F PR MEDICATION LIST DOCUMENTED IN MEDICAL RECORD: ICD-10-PCS | Mod: CPTII,S$GLB,, | Performed by: SURGERY

## 2022-11-29 PROCEDURE — 1159F MED LIST DOCD IN RCRD: CPT | Mod: CPTII,S$GLB,, | Performed by: SURGERY

## 2022-11-29 RX ORDER — HYDROCODONE BITARTRATE AND ACETAMINOPHEN 10; 325 MG/1; MG/1
1 TABLET ORAL EVERY 6 HOURS PRN
Qty: 20 TABLET | Refills: 0 | Status: SHIPPED | OUTPATIENT
Start: 2022-11-29 | End: 2023-10-25

## 2022-12-01 ENCOUNTER — TELEPHONE (OUTPATIENT)
Dept: HEMATOLOGY/ONCOLOGY | Facility: CLINIC | Age: 37
End: 2022-12-01
Payer: COMMERCIAL

## 2022-12-01 NOTE — TELEPHONE ENCOUNTER
Patient reports she has just had thyroidectomy and has some additional enlarged nodes in the neck. Her endocrinologist has referred her to Dr. Francois. Informed her I would have Dr. Francois review the chart and determine if CT NEck needed and then I would contact her early next week when Dr. Francois returns to the office. She verbalized understanding    ----- Message from Keila Castro sent at 12/1/2022 12:41 PM CST -----  Type: Need Medical Advice  Who Called:Marguerite   Shaji callback number: [phone]  Additional Info: Patient has a referral to see Ericka Francois first available appointment   Please call to further assist, Thanks

## 2022-12-05 ENCOUNTER — TELEPHONE (OUTPATIENT)
Dept: HEMATOLOGY/ONCOLOGY | Facility: CLINIC | Age: 37
End: 2022-12-05
Payer: COMMERCIAL

## 2022-12-05 DIAGNOSIS — D49.7 THYROID NEOPLASM: Primary | ICD-10-CM

## 2022-12-05 NOTE — NURSING
1230-spoke with patient and provided appt info and directions to cancer center and she verbalized understanding      Followed up with patient. Per Dr. Francois she will need CT NECK and TSH prior to visit. Verbal orders placed. Patient is established with Dr. Arzate and she is S/P thyroidectomy by Dr. Toussaint. She requests tests be done in [place]. Informed her someone would reach out to schedule and she verbalized understanding. Will schedule her to see Dr. Francois once testing complete      Oncology Navigation   Intake  Date of Diagnosis: 11/18/22  Cancer Type: Head and Neck  Internal / External Referral: External (Dr. Dr. Arzate)  Date of Referral: 12/01/22  Initial Nurse Navigator Contact: 12/01/22  Referral to Initial Contact Timeline (days): 0  Date Worked: 12/05/22     Treatment     Surgical Oncologist: Dr. Geovanna Francois  Type of Surgery: Thyroidectomy                          Acuity      Follow Up  No follow-ups on file.

## 2022-12-07 NOTE — PROGRESS NOTES
Subjective:       Patient ID: Marguerite Colon is a 37 y.o. female.    Chief Complaint: Post-op Evaluation      HPI:  S/p total thyroid. Path more advanced than expected. There is invasion into the skeletal muscle with positive margin. Node positive. Her voice is raspy but getting stronger. No symptoms of hypocalcemia.   THYROID, TOTAL THYROIDECTOMY:   - PAPILLARY CARCINOMA OF THYROID, FOLLICULAR VARIANT, INFILTRATIVE.   - PAPILLARY CARCINOMA IS MULTIFOCAL AND BILATERAL.   - LARGEST FOCUS OF CARCINOMA IS IN THE LEFT LOBE OF THYROID, 2.7 CM IN    SIZE.   - EXTRATHYROIDAL TUMOR EXTENSION TO INVOLVE SKELETAL MUSCLE.   - LEFT LOWER SURGICAL MARGINS ARE INVOLVED BY CARCINOMA.   - ONE INCIDENTAL LYMPH NODE WITH METASTATIC PAPILLARY CARCINOMA.   - INCIDENTAL LEFT SIDE PARATHYROID GLAND TISSUE CONSISTENT WITH LEFT    SUPERIOR AND LEFT     INFERIOR PARATHYROID  GLANDS WITH NO HISTOPATHOLOGIC ABNORMALITY.     Note: The above findings and diagnosis were discussed with Dr. Hugh Toussaint on 11/18/2022 at 1100 hours.       CASE SUMMARY FOR CARCINOMA OF THE THYROID GLAND:   PROCEDURE: Total thyroidectomy   TUMOR FOCALITY: Multifocal   TUMOR SITE: Left lobe and right lobe of thyroid   TUMOR SIZE: Left lobe tumor size = 2.7 cm   Right lobe tumor size =4.0 mm   HISTOLOGIC TYPE: Papillary carcinoma, follicular variant, infiltrative   ANGIOINVASION: Present   LYMPHOVASCULAR INVASION: Present   PERINEURAL INVASION: Present   EXTRATHYROIDAL EXTENSION: Present, microscopic strap muscle invasion    only, with no   KNOWN clinical / macroscopic evidence of invasion.   MARGIN STATUS: Margins involved by carcinoma   SURGICAL MARGINS INVOLVED BY CARCINOMA: Anterior and posterior left    lobe margins   REGIONAL LYMPH NODE STATUS: Tumor present in regional lymph node   NUMBER OF LYMPH NODES WITH TUMOR: 1   RAFAL LEVEL INVOLVED: Level VI   SIZE OF LARGEST METASTATIC DEPOSIT: 0.5 mm   EXTRANODAL EXTENSION: Not identified   NUMBER OF LYMPH NODES  EXAMINED: 1   RAFAL LEVEL EXAMINED: Level VI (incidental lymph node in sections from    thyroid ismus)   DISTANT METASTASIS: Not applicable   PATHOLOGIC STAGE CLASSIFICATION BY AJCC 8TH EDITION: pT3: Tumor of any    size with extrathyroidal extension,            (subcategory cannot be determined on the basis of             pathologic examination only)   pN1a:  Metastasis to Level VI or VII lymph nodes   pM:  Not applicable.       Review of Systems    Objective:      Physical Exam  Constitutional:       General: She is not in acute distress.  Neck:        Comments: Mild swelling.  Incision intact  No hematoma. No evidence of infection.   Pulmonary:      Effort: Pulmonary effort is normal. No respiratory distress.   Neurological:      Mental Status: She is alert and oriented to person, place, and time.       Assessment/Plan:   Marguerite AYALA was seen today for post-op evaluation.    Diagnoses and all orders for this visit:    Primary thyroid cancer - t3n1a, positive margin    Thyroid cancer is advanced. There is invasion into the surrounding muscle with positive margin. discussed case with her Endocrinologist. Recommended referral for neck dissection. Agree. Referral sent from endo.

## 2022-12-09 ENCOUNTER — HOSPITAL ENCOUNTER (OUTPATIENT)
Dept: RADIOLOGY | Facility: HOSPITAL | Age: 37
Discharge: HOME | End: 2022-12-09
Attending: OTOLARYNGOLOGY
Payer: COMMERCIAL

## 2022-12-09 DIAGNOSIS — D49.7 THYROID NEOPLASM: ICD-10-CM

## 2022-12-09 PROCEDURE — 70491 CT SOFT TISSUE NECK W/DYE: CPT | Mod: 26,,, | Performed by: RADIOLOGY

## 2022-12-09 PROCEDURE — 70491 CT SOFT TISSUE NECK WITH CONTRAST: ICD-10-PCS | Mod: 26,,, | Performed by: RADIOLOGY

## 2022-12-09 PROCEDURE — 25500020 PHARM REV CODE 255

## 2022-12-09 PROCEDURE — 70491 CT SOFT TISSUE NECK W/DYE: CPT | Mod: TC

## 2022-12-09 RX ADMIN — IOHEXOL 75 ML: 350 INJECTION, SOLUTION INTRAVENOUS at 04:12

## 2022-12-13 ENCOUNTER — OFFICE VISIT (OUTPATIENT)
Dept: HEMATOLOGY/ONCOLOGY | Facility: CLINIC | Age: 37
End: 2022-12-13
Payer: COMMERCIAL

## 2022-12-13 VITALS
BODY MASS INDEX: 45.99 KG/M2 | HEIGHT: 67 IN | RESPIRATION RATE: 18 BRPM | TEMPERATURE: 97 F | SYSTOLIC BLOOD PRESSURE: 113 MMHG | OXYGEN SATURATION: 96 % | WEIGHT: 293 LBS | HEART RATE: 70 BPM | DIASTOLIC BLOOD PRESSURE: 70 MMHG

## 2022-12-13 DIAGNOSIS — K74.69 OTHER CIRRHOSIS OF LIVER: ICD-10-CM

## 2022-12-13 DIAGNOSIS — G47.33 OBSTRUCTIVE SLEEP APNEA SYNDROME IN ADULT: Primary | ICD-10-CM

## 2022-12-13 DIAGNOSIS — Z85.43 HX OF OVARIAN CANCER: ICD-10-CM

## 2022-12-13 DIAGNOSIS — J38.00 VOCAL CORD PARESIS: ICD-10-CM

## 2022-12-13 DIAGNOSIS — E66.01 MORBID OBESITY: ICD-10-CM

## 2022-12-13 DIAGNOSIS — C73 THYROID CANCER: ICD-10-CM

## 2022-12-13 PROCEDURE — 31575 DIAGNOSTIC LARYNGOSCOPY: CPT | Mod: S$GLB,,, | Performed by: OTOLARYNGOLOGY

## 2022-12-13 PROCEDURE — 1159F MED LIST DOCD IN RCRD: CPT | Mod: CPTII,S$GLB,, | Performed by: OTOLARYNGOLOGY

## 2022-12-13 PROCEDURE — 99999 PR PBB SHADOW E&M-EST. PATIENT-LVL IV: CPT | Mod: PBBFAC,,, | Performed by: OTOLARYNGOLOGY

## 2022-12-13 PROCEDURE — 3078F DIAST BP <80 MM HG: CPT | Mod: CPTII,S$GLB,, | Performed by: OTOLARYNGOLOGY

## 2022-12-13 PROCEDURE — 3074F SYST BP LT 130 MM HG: CPT | Mod: CPTII,S$GLB,, | Performed by: OTOLARYNGOLOGY

## 2022-12-13 PROCEDURE — 3074F PR MOST RECENT SYSTOLIC BLOOD PRESSURE < 130 MM HG: ICD-10-PCS | Mod: CPTII,S$GLB,, | Performed by: OTOLARYNGOLOGY

## 2022-12-13 PROCEDURE — 31575 PR LARYNGOSCOPY, FLEXIBLE; DIAGNOSTIC: ICD-10-PCS | Mod: S$GLB,,, | Performed by: OTOLARYNGOLOGY

## 2022-12-13 PROCEDURE — 99205 OFFICE O/P NEW HI 60 MIN: CPT | Mod: 25,S$GLB,, | Performed by: OTOLARYNGOLOGY

## 2022-12-13 PROCEDURE — 3078F PR MOST RECENT DIASTOLIC BLOOD PRESSURE < 80 MM HG: ICD-10-PCS | Mod: CPTII,S$GLB,, | Performed by: OTOLARYNGOLOGY

## 2022-12-13 PROCEDURE — 1159F PR MEDICATION LIST DOCUMENTED IN MEDICAL RECORD: ICD-10-PCS | Mod: CPTII,S$GLB,, | Performed by: OTOLARYNGOLOGY

## 2022-12-13 PROCEDURE — 1160F RVW MEDS BY RX/DR IN RCRD: CPT | Mod: CPTII,S$GLB,, | Performed by: OTOLARYNGOLOGY

## 2022-12-13 PROCEDURE — 3008F BODY MASS INDEX DOCD: CPT | Mod: CPTII,S$GLB,, | Performed by: OTOLARYNGOLOGY

## 2022-12-13 PROCEDURE — 1111F DSCHRG MED/CURRENT MED MERGE: CPT | Mod: CPTII,S$GLB,, | Performed by: OTOLARYNGOLOGY

## 2022-12-13 PROCEDURE — 99205 PR OFFICE/OUTPT VISIT, NEW, LEVL V, 60-74 MIN: ICD-10-PCS | Mod: 25,S$GLB,, | Performed by: OTOLARYNGOLOGY

## 2022-12-13 PROCEDURE — 3008F PR BODY MASS INDEX (BMI) DOCUMENTED: ICD-10-PCS | Mod: CPTII,S$GLB,, | Performed by: OTOLARYNGOLOGY

## 2022-12-13 PROCEDURE — 99999 PR PBB SHADOW E&M-EST. PATIENT-LVL IV: ICD-10-PCS | Mod: PBBFAC,,, | Performed by: OTOLARYNGOLOGY

## 2022-12-13 PROCEDURE — 1111F PR DISCHARGE MEDS RECONCILED W/ CURRENT OUTPATIENT MED LIST: ICD-10-PCS | Mod: CPTII,S$GLB,, | Performed by: OTOLARYNGOLOGY

## 2022-12-13 PROCEDURE — 1160F PR REVIEW ALL MEDS BY PRESCRIBER/CLIN PHARMACIST DOCUMENTED: ICD-10-PCS | Mod: CPTII,S$GLB,, | Performed by: OTOLARYNGOLOGY

## 2022-12-13 RX ORDER — LEVOTHYROXINE SODIUM 125 UG/1
125 TABLET ORAL
COMMUNITY
Start: 2022-12-01 | End: 2023-10-25

## 2022-12-13 RX ORDER — BALOXAVIR MARBOXIL 80 MG/1
1 TABLET, FILM COATED ORAL ONCE
COMMUNITY
Start: 2022-11-09 | End: 2023-02-07

## 2022-12-13 NOTE — PROGRESS NOTES
Date of Encounter: 12/13/2022  Provider: Geovanna Francois MD  Referring MD: Kate Arzate MD  PCP: ABDIRASHID Santos  Med Onc: Rusty Gaytan MD  Derm: Annalisa Calvillo MD  General Surgeon: Hugh Cameron MD    CC:  Metastatic papillary adenocarcinoma the thyroid    HPI:      Patient is a 37-year-old female with a history of papillary thyroid carcinoma status post total thyroidectomy who was referred for evaluation and treatment for metastatic disease by Dr. Kate Miller.  Patient is status post total thyroidectomy November 16, 2022.  Pathology revealed extension into the skeletal muscles and 1 positive lymph node (see path below).  Thyroglobulin was ordered which was elevated (5.0).    Patient just started synthroid one week ago.    Voice following surgery was very hoarse but is improving.  Since surgery she intermittently experiences dyspnea--at rest and with activity.  She has base line SOB but now worse post op.    ROS: see HPI  Constitutional: Negative for activity change and appetite change, weight loss.   Eyes: Negative for discharge, visual changes.   Respiratory: Negative for difficulty breathing and wheezing   Cardiovascular: Negative for chest pain.   Gastrointestinal: Negative for abdominal distention and abdominal pain.   Endocrine: Negative for cold intolerance and heat intolerance.   Genitourinary: Negative for dysuria.   Musculoskeletal: Negative for gait problem, muscle pain and joint swelling.   Skin: Negative for color change and pallor; negative for skin lesions.   Neurological: Negative for syncope and weakness; no numbness face.   Psychiatric/Behavioral: Negative for agitation and confusion; negative for depression.    Physical Exam:      Constitutional  General Appearance: well nourished, well-developed, alert, oriented, in no acute distress  Communication: ability, understanding, normal  Head and Face  Inspection: normocephalic, atraumatic, no scars, lesions or masses   Palpation: no  stepoffs, sinus tenderness or masses  Parotid glands: no masses, stones, swelling or tenderness  Eyes  Ocular Motility / Alignment: normal alignment, motility, no proptosis, enophthalmus or nystagmus  Conjunctiva: not injected  Eyelids: no hooding, lag, entropion, or ectropion  Ears  Hearing: speech reception thresholds grossly normal  External Nose: no lesions, tenderness, trauma or deformity  Intranasal Exam: no edema, erythema, discharge, mass or obstruction  Oral Cavity / Oropharynx  Lips: upper and lower lips pink and moist  Teeth: good dentition  Gingiva: healthy  Oral Mucosa: moist, no mucosal lesions  Floor of Mouth: normal, no lesions, salivary ducts patent  Tongue: moist, normal mobility, no lesions  Palate: soft and hard palates without lesions or ulcers  Oropharynx: tonsils and walls without erythema, exudate, base of tongue soft to palpation  Nasopharynx, Hypopharynx, and Larynx  Indirect: could not perform exam due to intolerance by patient  Neck  Inspection and Palpation: thick layer of persistent dermabond overlying incision--partly removed--incision intact; no erythema, induration, emphysema, tenderness or masses  Larynx and Trachea: normal position; normal crepitus  Thyroid: no tenderness, enlargement or nodules  Submandibular Glands: no masses or tenderness  Lymphatic:  Anterior, Posterior, Submandibular, Submental, Supraclavicular: no lymphadenopathy present  Chest / Respiratory  Chest: no stridor or retractions, normal effort and expansion  Cardiovascular:  Pulses: 2+ carotid pulses bilaterally  Auscultation: deferred  Neurological  Cranial Nerves: grossly intact  General: no focal deficits  Psychiatric  Orientation: oriented to time, place and person  Mood and Affect: no depression, anxiety or agitation  Extremities  Inspection: moves all extremities well  Donor site  Chest, Back, Abdomen, Arms, Legs: N/A    PROCEDURES:   -------------------- LARYNGOSCOPY / NASOPHARYNGOSCOPY  -------------------------     Pre-op DX: thyroid cancer; dyspnea  Post-op DX: same  Anesthesia: Topical Neosynephrine / Tetracaine  Indications: to look at larynx  Adverse Events: None        Procedure in Detail:     Flexible endoscopy with video was performed through the nasal passages. The nasal cavity, nasopharynx, oropharynx, hypopharynx and larynx were adequately visualized. The true vocal cords and arytenoids were examined during phonation and repose.        Operative Findings:     Nasal Cavity: Within normal limits  Nasopharynx: Within normal limits  Tongue Base: Within normal limits  Pharyngeal Walls: Within normal limits  Epiglottis / Aryepiglottic Folds: Within normal limits  Pyriform Sinus: Within normal limits  Vocal Cords: do not completely adduct; decrease abduction bilaterally  Arytenoids: Within normal limits      Test results:  Labs:  TSH 0.400 - 4.000 uIU/mL 63.172 High   0.366 Low       Calcium 8.7 - 10.5 mg/dL 8.6 Low   8.5 Low   8.6 Low       TRG 5.0    Path:  11/18/2022  Thyroid, total thyroidectomy:  Papillary carcinoma of thyroid, follicular variant, infiltrative   Papillary carcinomas multifocal and bilateral   Largest focus of carcinoma is in the left lobe of thyroid, 2.7 cm in size   Extrathyroidal tumor extension to involve skeletal muscle   Left lower surgical margins were involved by carcinoma  One incidental lymph node with node with metastatic papillary carcinoma   Incidental left side parathyroid gland tissue consistent with left superior and left inferior parathyroid glands with no histological abnormality.      Case summary:  Procedure: Total thyroidectomy   Tumor focality:  Multifocal   Tumor site: Left lobe and right lobe of thyroid   Tumor size:  Left lobe tumor size equals 2.7 cm   Right lobe tumor size:  4 mm   Histologic type:  Papillary carcinoma, follicular variant, infiltrative   Angio invasion:  Present   Lymphovascular invasion:  Present   Perineural invasion:  Present    Extrathyroidal extension:  Present, microscopic strap muscle invasion only with no known clinical/macroscopic evidence of invasion   Margins status:  Margins involved by carcinoma   Surgical margins involved by carcinoma:  Anterior and left posterior lobe margins   Additional lymph node status: Tumor present in regional lymph node, number of lymph nodes with tumor: 1  Marla level involved: Level 6   Size of largest metastatic deposit:  0.5 mm   Extranodal extension:  Not identified   Number of lymph nodes examined:  1  Marla level examined: Level 6 (incidental lymph node in section from thyroid isthmus) distant metastasis:  Not applicable   Pathologic stage: PT3 N1a    CT neck  FINDINGS:  The patient is undergone a total thyroidectomy.  There is soft tissue density in the thyroid bed on both sides more prominent on the left than on the right.  This is felt to represent postsurgical scar tissue.  The carotid and jugular vessels are intact without evidence of occlusion or stenosis.     Inferior to the surgical bed are identified a round 9 mm lymph node and a 1.4 cm lymph node extending into the superior mediastinum.  At the level of the thyroid bed however in the left anterior cervical triangle there is a higher density mass measuring 3.2 cm x 2 cm best seen series 602, image 96.  This is suspicious for metastasis.  Higher in the left neck at the level of the cricoid there is a 1.6 cm node best seen on series 2 image 143 with central hypodensity which is of the higher soft tissue density seen on the other mass.  Just above and behind this is a spiculated mass measuring 1.6 cm seen on series 2, image 133.  These 3 masses are strongly suspected to be metastatic neoplasm.     There are multiple additional small lymph nodes identified in both sides of the neck however these are normal soft tissue density.  The parotid and submandibular salivary glands are bilaterally symmetric and of normal CT density.  The structures  of the pharynx and larynx are within normal limits.     Impression:     Status post total thyroidectomy.  Soft tissue scar tissue is seen in the thyroid bed more prominent on the left than on the right.  There are however 3 suspicious high soft tissue density masses in the left anterior cervical triangle measuring 3.2 cm, 1.6 cm and 1.6 cm.  Two small lymph nodes of normal CT density are seen in the superior mediastinum and shoddy adenopathy is noted in the neck bilaterally.    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 Cameron, general surgery  Will present at  this week  Discussed the fact that vocal cords do not abduct normally and depending upon what Dr. Cameron says, she may need to wait a few months before she has surgery to allow for recovery  It was recommended that she have a left modified radical neck dissection with a bilateral level 6 neck dissection.  Due to the fact that she was recently operated, she is at very high risk of temporary or permanent hypoparathyroidism with hypocalcemia and need for calcium and vitamin-D and she is also at higher risk for injury to the recurrent laryngeal nerves with need for tracheotomy.  Risks, benefits and alternatives was discussed with the patient questions were answered.  Risks include but are not limited to bleeding, scarring, infection, hematoma, seroma, Chyle leak, swelling of the skin of the neck and face, numbness of the skin of the neck and face were 6-12 months, injury to the cranial nerves in the neck with weak lip, weak tongue, hoarseness weak upper extremity, weak shoulder weak diaphragm, need for  transfusion, injury to the great vessels in the neck with bleeding stroke or death in addition to the previously mentioned above complications

## 2022-12-15 ENCOUNTER — TELEPHONE (OUTPATIENT)
Dept: SURGERY | Facility: CLINIC | Age: 37
End: 2022-12-15
Payer: COMMERCIAL

## 2022-12-15 NOTE — TELEPHONE ENCOUNTER
----- Message from Cassia Coffman sent at 12/15/2022  9:33 AM CST -----  Regarding: Advise  Contact: Jailene with [facility]  Type: Needs Medical Advice  Who Called:  Jailene with [facility]  Symptoms (please be specific):  Patient  How long has patient had these symptoms:    Pharmacy name and phone #:    Best Call Back Number: [phone]  Additional Information: Jailene states Dr. Francois would like to speak with doctor regarding patient. Please call Jailene. Thanks!

## 2022-12-20 ENCOUNTER — TELEPHONE (OUTPATIENT)
Dept: HEMATOLOGY/ONCOLOGY | Facility: CLINIC | Age: 37
End: 2022-12-20
Payer: COMMERCIAL

## 2023-01-05 ENCOUNTER — OFFICE VISIT (OUTPATIENT)
Dept: DERMATOLOGY | Facility: CLINIC | Age: 38
End: 2023-01-05
Payer: COMMERCIAL

## 2023-01-05 DIAGNOSIS — L40.9 PSORIASIS: Primary | ICD-10-CM

## 2023-01-05 PROCEDURE — 1159F PR MEDICATION LIST DOCUMENTED IN MEDICAL RECORD: ICD-10-PCS | Mod: CPTII,S$GLB,, | Performed by: STUDENT IN AN ORGANIZED HEALTH CARE EDUCATION/TRAINING PROGRAM

## 2023-01-05 PROCEDURE — 1160F PR REVIEW ALL MEDS BY PRESCRIBER/CLIN PHARMACIST DOCUMENTED: ICD-10-PCS | Mod: CPTII,S$GLB,, | Performed by: STUDENT IN AN ORGANIZED HEALTH CARE EDUCATION/TRAINING PROGRAM

## 2023-01-05 PROCEDURE — 11900 PR INJECTION INTO SKIN LESIONS, UP TO 7: ICD-10-PCS | Mod: S$GLB,,, | Performed by: STUDENT IN AN ORGANIZED HEALTH CARE EDUCATION/TRAINING PROGRAM

## 2023-01-05 PROCEDURE — 1160F RVW MEDS BY RX/DR IN RCRD: CPT | Mod: CPTII,S$GLB,, | Performed by: STUDENT IN AN ORGANIZED HEALTH CARE EDUCATION/TRAINING PROGRAM

## 2023-01-05 PROCEDURE — 11900 INJECT SKIN LESIONS </W 7: CPT | Mod: S$GLB,,, | Performed by: STUDENT IN AN ORGANIZED HEALTH CARE EDUCATION/TRAINING PROGRAM

## 2023-01-05 PROCEDURE — 99999 PR PBB SHADOW E&M-EST. PATIENT-LVL II: ICD-10-PCS | Mod: PBBFAC,,, | Performed by: STUDENT IN AN ORGANIZED HEALTH CARE EDUCATION/TRAINING PROGRAM

## 2023-01-05 PROCEDURE — 99214 PR OFFICE/OUTPT VISIT, EST, LEVL IV, 30-39 MIN: ICD-10-PCS | Mod: 25,S$GLB,, | Performed by: STUDENT IN AN ORGANIZED HEALTH CARE EDUCATION/TRAINING PROGRAM

## 2023-01-05 PROCEDURE — 99214 OFFICE O/P EST MOD 30 MIN: CPT | Mod: 25,S$GLB,, | Performed by: STUDENT IN AN ORGANIZED HEALTH CARE EDUCATION/TRAINING PROGRAM

## 2023-01-05 PROCEDURE — 1159F MED LIST DOCD IN RCRD: CPT | Mod: CPTII,S$GLB,, | Performed by: STUDENT IN AN ORGANIZED HEALTH CARE EDUCATION/TRAINING PROGRAM

## 2023-01-05 PROCEDURE — 99999 PR PBB SHADOW E&M-EST. PATIENT-LVL II: CPT | Mod: PBBFAC,,, | Performed by: STUDENT IN AN ORGANIZED HEALTH CARE EDUCATION/TRAINING PROGRAM

## 2023-01-05 RX ORDER — BETAMETHASONE DIPROPIONATE 0.5 MG/G
CREAM TOPICAL 2 TIMES DAILY
Qty: 50 G | Refills: 4 | Status: SHIPPED | OUTPATIENT
Start: 2023-01-05 | End: 2023-10-25

## 2023-01-05 RX ORDER — TAPINAROF 10 MG/1000MG
CREAM TOPICAL
Qty: 60 G | Refills: 1 | Status: SHIPPED | OUTPATIENT
Start: 2023-01-05

## 2023-01-05 RX ORDER — LEVOTHYROXINE SODIUM 150 MCG
150 TABLET ORAL
COMMUNITY
Start: 2022-12-22 | End: 2023-10-25 | Stop reason: SDUPTHER

## 2023-01-05 NOTE — PROGRESS NOTES
Subjective:       Patient ID:  Marguerite Colon is a 37 y.o. female who presents for   Chief Complaint   Patient presents with    Follow-up     psoriasis     LOV 7/18/22    Patient here today for f/u on psoriasis. States she is currently flared on elbows for a few months. Itches. Has been using augmented betamethasone to help with the itch. ILK at last visit which was helpful.      Recently diagnosed with thyroid cancer. Had thyroid removed in November - on synthroid. Had initial surgery but margins were positive and she will need a second surgery with radiation. Path shows papillary thyroid carcinoma, follicular variant - margins positive and + lymph node.      also had + quanti gold which was treated with rifampin. She thinks she finished the course but has not followed up with ID    Hx of depression/anxiety which has improved. Related to going through a divorce. She is now only on zoloft.      Hx of ovarian cancer in 2013- had chemo and complete hysterectomy                Review of Systems   Constitutional:  Negative for fever, chills and fatigue.   Respiratory:  Negative for cough and shortness of breath.    Gastrointestinal:  Negative for nausea and vomiting.   Skin:  Positive for itching and rash.      Objective:    Physical Exam   Constitutional: She appears well-developed and well-nourished.   Neurological: She is alert and oriented to person, place, and time.   Psychiatric: She has a normal mood and affect.   Skin:   Areas Examined (abnormalities noted in diagram):   Head / Face Inspection Performed  Neck Inspection Performed  Back Inspection Performed  RUE Inspected  LUE Inspection Performed            Diagram Legend     Erythematous scaling macule/papule c/w actinic keratosis       Vascular papule c/w angioma      Pigmented verrucoid papule/plaque c/w seborrheic keratosis      Yellow umbilicated papule c/w sebaceous hyperplasia      Irregularly shaped tan macule c/w lentigo     1-2 mm smooth white papules  consistent with Milia      Movable subcutaneous cyst with punctum c/w epidermal inclusion cyst      Subcutaneous movable cyst c/w pilar cyst      Firm pink to brown papule c/w dermatofibroma      Pedunculated fleshy papule(s) c/w skin tag(s)      Evenly pigmented macule c/w junctional nevus     Mildly variegated pigmented, slightly irregular-bordered macule c/w mildly atypical nevus      Flesh colored to evenly pigmented papule c/w intradermal nevus       Pink pearly papule/plaque c/w basal cell carcinoma      Erythematous hyperkeratotic cursted plaque c/w SCC      Surgical scar with no sign of skin cancer recurrence      Open and closed comedones      Inflammatory papules and pustules      Verrucoid papule consistent consistent with wart     Erythematous eczematous patches and plaques     Dystrophic onycholytic nail with subungual debris c/w onychomycosis     Umbilicated papule    Erythematous-base heme-crusted tan verrucoid plaque consistent with inflamed seborrheic keratosis     Erythematous Silvery Scaling Plaque c/w Psoriasis     See annotation      Assessment / Plan:        Psoriasis- uncontrolled   -     tapinarof (VTAMA) 1 % Crea; Use on AA once daily  Dispense: 60 g; Refill: 1  -     augmented betamethasone dipropionate (DIPROLENE-AF) 0.05 % cream; Apply topically 2 (two) times daily.  Dispense: 50 g; Refill: 4  -Intralesional Kenalog 5mg/cc (1 cc total) injected into 6 lesions on the elbows today after obtaining verbal consent including risk of surrounding hypopigmentation. Patient tolerated procedure well.    Units: 1  NDC for Kenalog 10mg/cc:  [id number]  - given current thyroid carcinoma will hold off on systemic medications  - trial of vtama- discussed risks and side effects, refilled diprolene AF if she is unable to get vtama filled  - she thinks she finished rifampin  - did have GI upset- may need to f/u with ID- will CC chart to Dr. Carey           No follow-ups on file.

## 2023-01-09 ENCOUNTER — PATIENT MESSAGE (OUTPATIENT)
Dept: DERMATOLOGY | Facility: CLINIC | Age: 38
End: 2023-01-09
Payer: COMMERCIAL

## 2023-01-18 ENCOUNTER — PATIENT MESSAGE (OUTPATIENT)
Dept: HEMATOLOGY/ONCOLOGY | Facility: CLINIC | Age: 38
End: 2023-01-18
Payer: COMMERCIAL

## 2023-02-07 ENCOUNTER — OFFICE VISIT (OUTPATIENT)
Dept: FAMILY MEDICINE | Facility: CLINIC | Age: 38
End: 2023-02-07
Payer: COMMERCIAL

## 2023-02-07 VITALS
OXYGEN SATURATION: 96 % | WEIGHT: 293 LBS | RESPIRATION RATE: 20 BRPM | HEIGHT: 67 IN | HEART RATE: 79 BPM | TEMPERATURE: 98 F | DIASTOLIC BLOOD PRESSURE: 82 MMHG | BODY MASS INDEX: 45.99 KG/M2 | SYSTOLIC BLOOD PRESSURE: 137 MMHG

## 2023-02-07 DIAGNOSIS — R59.1 LYMPHADENOPATHY: ICD-10-CM

## 2023-02-07 DIAGNOSIS — C73 THYROID CANCER: Primary | ICD-10-CM

## 2023-02-07 PROCEDURE — 3075F PR MOST RECENT SYSTOLIC BLOOD PRESS GE 130-139MM HG: ICD-10-PCS | Mod: CPTII,S$GLB,, | Performed by: NURSE PRACTITIONER

## 2023-02-07 PROCEDURE — 1160F PR REVIEW ALL MEDS BY PRESCRIBER/CLIN PHARMACIST DOCUMENTED: ICD-10-PCS | Mod: CPTII,S$GLB,, | Performed by: NURSE PRACTITIONER

## 2023-02-07 PROCEDURE — 3008F BODY MASS INDEX DOCD: CPT | Mod: CPTII,S$GLB,, | Performed by: NURSE PRACTITIONER

## 2023-02-07 PROCEDURE — 3008F PR BODY MASS INDEX (BMI) DOCUMENTED: ICD-10-PCS | Mod: CPTII,S$GLB,, | Performed by: NURSE PRACTITIONER

## 2023-02-07 PROCEDURE — 1159F MED LIST DOCD IN RCRD: CPT | Mod: CPTII,S$GLB,, | Performed by: NURSE PRACTITIONER

## 2023-02-07 PROCEDURE — 3079F PR MOST RECENT DIASTOLIC BLOOD PRESSURE 80-89 MM HG: ICD-10-PCS | Mod: CPTII,S$GLB,, | Performed by: NURSE PRACTITIONER

## 2023-02-07 PROCEDURE — 1159F PR MEDICATION LIST DOCUMENTED IN MEDICAL RECORD: ICD-10-PCS | Mod: CPTII,S$GLB,, | Performed by: NURSE PRACTITIONER

## 2023-02-07 PROCEDURE — 1160F RVW MEDS BY RX/DR IN RCRD: CPT | Mod: CPTII,S$GLB,, | Performed by: NURSE PRACTITIONER

## 2023-02-07 PROCEDURE — 99213 OFFICE O/P EST LOW 20 MIN: CPT | Mod: S$GLB,,, | Performed by: NURSE PRACTITIONER

## 2023-02-07 PROCEDURE — 3075F SYST BP GE 130 - 139MM HG: CPT | Mod: CPTII,S$GLB,, | Performed by: NURSE PRACTITIONER

## 2023-02-07 PROCEDURE — 99213 PR OFFICE/OUTPT VISIT, EST, LEVL III, 20-29 MIN: ICD-10-PCS | Mod: S$GLB,,, | Performed by: NURSE PRACTITIONER

## 2023-02-07 PROCEDURE — 3079F DIAST BP 80-89 MM HG: CPT | Mod: CPTII,S$GLB,, | Performed by: NURSE PRACTITIONER

## 2023-02-07 RX ORDER — TIRZEPATIDE 10 MG/.5ML
INJECTION, SOLUTION SUBCUTANEOUS
COMMUNITY
Start: 2023-02-04 | End: 2023-10-25

## 2023-02-07 RX ORDER — ATORVASTATIN CALCIUM 20 MG/1
20 TABLET, FILM COATED ORAL
COMMUNITY
Start: 2023-02-01 | End: 2023-10-25 | Stop reason: SDUPTHER

## 2023-02-07 RX ORDER — IBUPROFEN 800 MG/1
800 TABLET ORAL
Qty: 30 TABLET | Refills: 0 | Status: SHIPPED | OUTPATIENT
Start: 2023-02-07 | End: 2023-10-25

## 2023-02-07 RX ORDER — METHOCARBAMOL 750 MG/1
750 TABLET, FILM COATED ORAL 3 TIMES DAILY PRN
Qty: 30 TABLET | Refills: 0 | Status: SHIPPED | OUTPATIENT
Start: 2023-02-07 | End: 2023-10-25

## 2023-02-07 NOTE — PROGRESS NOTES
SUBJECTIVE:      Patient ID: aMrguerite Colon is a 37 y.o. female.    Chief Complaint: Neck Pain    Marguerite is here today for complaints of left-sided neck pain which started about 2-3 weeks ago.  Patient was diagnosed with thyroid cancer since last visit with me and had a thyroidectomy-1 lymph node was positive for metastasis.  Patient is currently seeing head and neck Oncology in [place] at [facility]-has upcoming appointment next week.  She was instructed by their clinic to contact me for her complaints.  Patient denies any recent trauma, injury, or falls.  She denies any sore throat, URI symptoms, fever or chills.  She has tried ibuprofen over-the-counter which has not been very helpful.  She does feel like her lymph nodes are enlarged on the left side of her neck.    Neck Pain   This is a new problem. The current episode started 1 to 4 weeks ago. The problem has been waxing and waning. The pain is associated with nothing. The pain is present in the left side. The quality of the pain is described as aching. The pain is at a severity of 6/10. The pain is moderate. Nothing aggravates the symptoms. Pertinent negatives include no chest pain, fever, headaches, leg pain, numbness, pain with swallowing, paresis, photophobia, syncope, tingling, trouble swallowing, visual change or weakness. She has tried nothing for the symptoms.     Family History   Problem Relation Age of Onset    Diabetes Mother     Heart disease Mother     Stroke Mother     Hypertension Mother     Diabetes Father     Ovarian cancer Neg Hx     Uterine cancer Neg Hx     Breast cancer Neg Hx     Colon cancer Neg Hx       Social History     Socioeconomic History    Marital status: Other   Tobacco Use    Smoking status: Every Day     Packs/day: 0.25     Years: 20.00     Pack years: 5.00     Types: Cigarettes    Smokeless tobacco: Never   Substance and Sexual Activity    Alcohol use: No    Drug use: No    Sexual activity: Not Currently     Partners:  "Male     Current Outpatient Medications   Medication Sig Dispense Refill    albuterol (VENTOLIN HFA) 90 mcg/actuation inhaler Inhale 2 puffs into the lungs every 6 (six) hours as needed for Wheezing. Rescue 18 g 0    atorvastatin (LIPITOR) 20 MG tablet Take 20 mg by mouth.      atorvastatin (LIPITOR) 40 MG tablet Take 20 mg by mouth every evening.      augmented betamethasone dipropionate (DIPROLENE-AF) 0.05 % cream Apply topically 2 (two) times daily. 50 g 4    calcipotriene-betamethasone (TACLONEX) ointment Apply topically once daily. 60 g 1    HUMULIN R U-500, CONC, KWIKPEN 500 unit/mL (3 mL) InPn INJECT 55 UNITS SUBCUTANEOUS BEFORE BREAKFAST, 65 UNITS BEFORE LUNCH & 78 UNITS BEFORE SUPPER      HYDROcodone-acetaminophen (NORCO)  mg per tablet Take 1 tablet by mouth every 6 (six) hours as needed for Pain. 20 tablet 0    insulin needles, disposable, 30 x 1/3 " Ndle Uses 4 daily, on multiple daily insulin injections 150 each 12    metformin (GLUCOPHAGE) 500 MG tablet Take 500 mg by mouth 2 (two) times daily.       MOUNJARO 10 mg/0.5 mL PnIj Inject into the skin.      OZEMPIC 2 mg/dose (8 mg/3 mL) PnIj Inject 2 mg into the skin every 7 days.      sertraline (ZOLOFT) 50 MG tablet TAKE 1 TABLET BY MOUTH EVERY DAY 90 tablet 1    SYNTHROID 125 mcg tablet Take 125 mcg by mouth.      SYNTHROID 150 mcg tablet Take 150 mcg by mouth.      tapinarof (VTAMA) 1 % Crea Use on AA once daily 60 g 1    ibuprofen (ADVIL,MOTRIN) 800 MG tablet Take 1 tablet (800 mg total) by mouth after meals as needed for Pain. 30 tablet 0    methocarbamoL (ROBAXIN) 750 MG Tab Take 1 tablet (750 mg total) by mouth 3 (three) times daily as needed (muscle spasm). 30 tablet 0     No current facility-administered medications for this visit.     Review of patient's allergies indicates:   Allergen Reactions    Carboplatin      Had to go to ER.     Tuberculin ppd Rash     localized      Past Medical History:   Diagnosis Date    Anxiety     Arthritis  "    Back pain     Bilateral headaches 04/01/2016    Cirrhosis     Diabetes mellitus     type 2    Hepatomegaly 08/01/2014    Morbidly obese     Obesity, Class III, BMI 40-49.9 (morbid obesity) 04/01/2016    Ovarian cancer 12/01/2012    grade 1 endometrioid adenoca of the ovary    Pancreatitis     Sleep apnea     Small bowel obstruction due to adhesions 12/01/2015    TB (pulmonary tuberculosis)     Thyroid disease     hypothyroid    Thyroid nodule      Past Surgical History:   Procedure Laterality Date    ABDOMINAL ADHESION SURGERY      done at same time with ovarian cystectomy    CHOLECYSTECTOMY  12/2015    HERNIA REPAIR  12/28/2015    25 x 20 cm Venrtralex ST mesh for repair done at time of [facility] for SBO.     HYSTERECTOMY  02/25/2013    adeline rso. prior lso. ovarian ca    leep      positive margins    left salpingo oophrectomy  07/2010    hemorrhagic cyst    OMENTECTOMY      partial right oophrectomy  12/2012    LMP of ovary    NH REMOVAL OF OVARY/TUBE(S)      THYROIDECTOMY Bilateral 11/16/2022    Procedure: THYROIDECTOMY;  Surgeon: Hugh Toussaint III, MD;  Location: [facility];  Service: General;  Laterality: Bilateral;    x lap  12/28/2015    post open cholecystectomy 2/21/2015.        Review of Systems   Constitutional:  Negative for activity change, appetite change, chills, fever and unexpected weight change.   HENT:  Negative for congestion, ear pain, hearing loss, rhinorrhea, sore throat and trouble swallowing.    Eyes:  Negative for photophobia, discharge and visual disturbance.   Respiratory:  Negative for cough, chest tightness, shortness of breath and wheezing.    Cardiovascular:  Negative for chest pain, palpitations and syncope.   Gastrointestinal:  Negative for abdominal pain, blood in stool, constipation, diarrhea, nausea and vomiting.   Endocrine: Negative for polydipsia and polyuria.   Genitourinary:  Negative for difficulty urinating, dysuria, hematuria and menstrual problem.   Musculoskeletal:   "Positive for neck pain. Negative for arthralgias, joint swelling and neck stiffness.   Neurological:  Negative for tingling, weakness, numbness and headaches.   Hematological:  Positive for adenopathy.   Psychiatric/Behavioral:  Negative for confusion and dysphoric mood.     OBJECTIVE:      Vitals:    02/07/23 1433   BP: 137/82   BP Location: Right arm   Patient Position: Sitting   BP Method: Large (Automatic)   Pulse: 79   Resp: 20   Temp: 98.4 °F (36.9 °C)   SpO2: 96%   Weight: (!) 145.3 kg (320 lb 6.4 oz)   Height: 5' 7" (1.702 m)     Physical Exam  Vitals and nursing note reviewed.   Constitutional:       General: She is not in acute distress.     Appearance: Normal appearance. She is obese. She is not ill-appearing or diaphoretic.   HENT:      Head: Normocephalic.      Right Ear: Tympanic membrane, ear canal and external ear normal.      Left Ear: Tympanic membrane, ear canal and external ear normal.      Nose: Nose normal. No rhinorrhea.      Mouth/Throat:      Mouth: Mucous membranes are moist.      Pharynx: Oropharynx is clear.   Eyes:      Conjunctiva/sclera: Conjunctivae normal.      Pupils: Pupils are equal, round, and reactive to light.   Neck:      Comments: Healed incision- recent thyroidectomy   Cardiovascular:      Rate and Rhythm: Normal rate and regular rhythm.   Pulmonary:      Effort: Pulmonary effort is normal. No respiratory distress.      Breath sounds: Normal breath sounds. No wheezing or rales.   Musculoskeletal:      Cervical back: Normal range of motion and neck supple.   Lymphadenopathy:      Head:      Right side of head: No submental, submandibular, tonsillar, preauricular, posterior auricular or occipital adenopathy.      Left side of head: No submental, submandibular, tonsillar, preauricular, posterior auricular or occipital adenopathy.      Cervical: Cervical adenopathy (with tenderness upon palpation) present.      Right cervical: No superficial, deep or posterior cervical " adenopathy.     Left cervical: Posterior cervical adenopathy present. No superficial cervical adenopathy.      Upper Body:      Right upper body: No supraclavicular adenopathy.      Left upper body: No supraclavicular adenopathy.   Skin:     General: Skin is warm and dry.      Coloration: Skin is not jaundiced or pale.   Neurological:      Mental Status: She is alert and oriented to person, place, and time.   Psychiatric:         Mood and Affect: Mood normal.         Behavior: Behavior normal.      Assessment:       1. Thyroid cancer    2. Lymphadenopathy          Plan:       Thyroid cancer    Lymphadenopathy  -     ibuprofen (ADVIL,MOTRIN) 800 MG tablet; Take 1 tablet (800 mg total) by mouth after meals as needed for Pain.  Dispense: 30 tablet; Refill: 0  -     methocarbamoL (ROBAXIN) 750 MG Tab; Take 1 tablet (750 mg total) by mouth 3 (three) times daily as needed (muscle spasm).  Dispense: 30 tablet; Refill: 0   -NSAIDS with food prn, robaxin if needed; heat pad prn; need follow up with team at [facility] next week      Follow up if symptoms worsen or fail to improve.      2/7/2023 ELIZABETH Santos, FNP    This note was created using [employer] voice recognition software that occasionally misinterprets phrases or words.

## 2023-02-16 ENCOUNTER — LAB VISIT (OUTPATIENT)
Dept: LAB | Facility: HOSPITAL | Age: 38
End: 2023-02-16
Attending: OTOLARYNGOLOGY
Payer: COMMERCIAL

## 2023-02-16 ENCOUNTER — OFFICE VISIT (OUTPATIENT)
Dept: HEMATOLOGY/ONCOLOGY | Facility: CLINIC | Age: 38
End: 2023-02-16
Payer: COMMERCIAL

## 2023-02-16 VITALS
HEART RATE: 64 BPM | RESPIRATION RATE: 12 BRPM | WEIGHT: 293 LBS | SYSTOLIC BLOOD PRESSURE: 127 MMHG | OXYGEN SATURATION: 96 % | TEMPERATURE: 97 F | DIASTOLIC BLOOD PRESSURE: 81 MMHG | HEIGHT: 67 IN | BODY MASS INDEX: 45.99 KG/M2

## 2023-02-16 DIAGNOSIS — K74.69 OTHER CIRRHOSIS OF LIVER: ICD-10-CM

## 2023-02-16 DIAGNOSIS — E83.51 HYPOCALCEMIA: ICD-10-CM

## 2023-02-16 DIAGNOSIS — F17.210 CIGARETTE SMOKER: ICD-10-CM

## 2023-02-16 DIAGNOSIS — M54.2 NECK PAIN ON LEFT SIDE: ICD-10-CM

## 2023-02-16 DIAGNOSIS — Z85.43 HX OF OVARIAN CANCER: Primary | ICD-10-CM

## 2023-02-16 DIAGNOSIS — E66.01 MORBID OBESITY: ICD-10-CM

## 2023-02-16 DIAGNOSIS — J38.00 VOCAL CORD PARESIS: ICD-10-CM

## 2023-02-16 DIAGNOSIS — G47.33 OBSTRUCTIVE SLEEP APNEA SYNDROME IN ADULT: ICD-10-CM

## 2023-02-16 DIAGNOSIS — C73 THYROID CANCER: ICD-10-CM

## 2023-02-16 LAB
ALBUMIN SERPL BCP-MCNC: 3.7 G/DL (ref 3.5–5.2)
ALP SERPL-CCNC: 98 U/L (ref 55–135)
ALT SERPL W/O P-5'-P-CCNC: 20 U/L (ref 10–44)
ANION GAP SERPL CALC-SCNC: 10 MMOL/L (ref 8–16)
AST SERPL-CCNC: 12 U/L (ref 10–40)
BASOPHILS # BLD AUTO: 0.04 K/UL (ref 0–0.2)
BASOPHILS NFR BLD: 0.5 % (ref 0–1.9)
BILIRUB SERPL-MCNC: 0.6 MG/DL (ref 0.1–1)
BUN SERPL-MCNC: 14 MG/DL (ref 6–20)
CA-I BLDV-SCNC: 1.24 MMOL/L (ref 1.06–1.42)
CALCIUM SERPL-MCNC: 9.4 MG/DL (ref 8.7–10.5)
CHLORIDE SERPL-SCNC: 102 MMOL/L (ref 95–110)
CO2 SERPL-SCNC: 24 MMOL/L (ref 23–29)
CREAT SERPL-MCNC: 0.9 MG/DL (ref 0.5–1.4)
DIFFERENTIAL METHOD: ABNORMAL
EOSINOPHIL # BLD AUTO: 0.2 K/UL (ref 0–0.5)
EOSINOPHIL NFR BLD: 2.3 % (ref 0–8)
ERYTHROCYTE [DISTWIDTH] IN BLOOD BY AUTOMATED COUNT: 14.3 % (ref 11.5–14.5)
EST. GFR  (NO RACE VARIABLE): >60 ML/MIN/1.73 M^2
GLUCOSE SERPL-MCNC: 281 MG/DL (ref 70–110)
HCT VFR BLD AUTO: 43.2 % (ref 37–48.5)
HGB BLD-MCNC: 14.2 G/DL (ref 12–16)
IMM GRANULOCYTES # BLD AUTO: 0.05 K/UL (ref 0–0.04)
IMM GRANULOCYTES NFR BLD AUTO: 0.6 % (ref 0–0.5)
LYMPHOCYTES # BLD AUTO: 3.1 K/UL (ref 1–4.8)
LYMPHOCYTES NFR BLD: 36.9 % (ref 18–48)
MCH RBC QN AUTO: 29.8 PG (ref 27–31)
MCHC RBC AUTO-ENTMCNC: 32.9 G/DL (ref 32–36)
MCV RBC AUTO: 91 FL (ref 82–98)
MONOCYTES # BLD AUTO: 0.5 K/UL (ref 0.3–1)
MONOCYTES NFR BLD: 5.4 % (ref 4–15)
NEUTROPHILS # BLD AUTO: 4.6 K/UL (ref 1.8–7.7)
NEUTROPHILS NFR BLD: 54.3 % (ref 38–73)
NRBC BLD-RTO: 0 /100 WBC
PLATELET # BLD AUTO: 194 K/UL (ref 150–450)
PMV BLD AUTO: 10.4 FL (ref 9.2–12.9)
POTASSIUM SERPL-SCNC: 4.3 MMOL/L (ref 3.5–5.1)
PROT SERPL-MCNC: 7.9 G/DL (ref 6–8.4)
PTH-INTACT SERPL-MCNC: 49.7 PG/ML (ref 9–77)
RBC # BLD AUTO: 4.77 M/UL (ref 4–5.4)
SODIUM SERPL-SCNC: 136 MMOL/L (ref 136–145)
WBC # BLD AUTO: 8.4 K/UL (ref 3.9–12.7)

## 2023-02-16 PROCEDURE — 1159F MED LIST DOCD IN RCRD: CPT | Mod: CPTII,S$GLB,, | Performed by: OTOLARYNGOLOGY

## 2023-02-16 PROCEDURE — 82330 ASSAY OF CALCIUM: CPT | Performed by: OTOLARYNGOLOGY

## 2023-02-16 PROCEDURE — 3008F BODY MASS INDEX DOCD: CPT | Mod: CPTII,S$GLB,, | Performed by: OTOLARYNGOLOGY

## 2023-02-16 PROCEDURE — 1160F RVW MEDS BY RX/DR IN RCRD: CPT | Mod: CPTII,S$GLB,, | Performed by: OTOLARYNGOLOGY

## 2023-02-16 PROCEDURE — 3074F SYST BP LT 130 MM HG: CPT | Mod: CPTII,S$GLB,, | Performed by: OTOLARYNGOLOGY

## 2023-02-16 PROCEDURE — 31575 PR LARYNGOSCOPY, FLEXIBLE; DIAGNOSTIC: ICD-10-PCS | Mod: S$GLB,,, | Performed by: OTOLARYNGOLOGY

## 2023-02-16 PROCEDURE — 85025 COMPLETE CBC W/AUTO DIFF WBC: CPT | Mod: PN | Performed by: OTOLARYNGOLOGY

## 2023-02-16 PROCEDURE — 99999 PR PBB SHADOW E&M-EST. PATIENT-LVL IV: ICD-10-PCS | Mod: PBBFAC,,, | Performed by: OTOLARYNGOLOGY

## 2023-02-16 PROCEDURE — 3074F PR MOST RECENT SYSTOLIC BLOOD PRESSURE < 130 MM HG: ICD-10-PCS | Mod: CPTII,S$GLB,, | Performed by: OTOLARYNGOLOGY

## 2023-02-16 PROCEDURE — 3079F PR MOST RECENT DIASTOLIC BLOOD PRESSURE 80-89 MM HG: ICD-10-PCS | Mod: CPTII,S$GLB,, | Performed by: OTOLARYNGOLOGY

## 2023-02-16 PROCEDURE — 1160F PR REVIEW ALL MEDS BY PRESCRIBER/CLIN PHARMACIST DOCUMENTED: ICD-10-PCS | Mod: CPTII,S$GLB,, | Performed by: OTOLARYNGOLOGY

## 2023-02-16 PROCEDURE — 36415 COLL VENOUS BLD VENIPUNCTURE: CPT | Mod: PN | Performed by: OTOLARYNGOLOGY

## 2023-02-16 PROCEDURE — 99215 PR OFFICE/OUTPT VISIT, EST, LEVL V, 40-54 MIN: ICD-10-PCS | Mod: 25,S$GLB,, | Performed by: OTOLARYNGOLOGY

## 2023-02-16 PROCEDURE — 3079F DIAST BP 80-89 MM HG: CPT | Mod: CPTII,S$GLB,, | Performed by: OTOLARYNGOLOGY

## 2023-02-16 PROCEDURE — 99215 OFFICE O/P EST HI 40 MIN: CPT | Mod: 25,S$GLB,, | Performed by: OTOLARYNGOLOGY

## 2023-02-16 PROCEDURE — 31575 DIAGNOSTIC LARYNGOSCOPY: CPT | Mod: S$GLB,,, | Performed by: OTOLARYNGOLOGY

## 2023-02-16 PROCEDURE — 83970 ASSAY OF PARATHORMONE: CPT | Performed by: OTOLARYNGOLOGY

## 2023-02-16 PROCEDURE — 1159F PR MEDICATION LIST DOCUMENTED IN MEDICAL RECORD: ICD-10-PCS | Mod: CPTII,S$GLB,, | Performed by: OTOLARYNGOLOGY

## 2023-02-16 PROCEDURE — 80053 COMPREHEN METABOLIC PANEL: CPT | Mod: PN | Performed by: OTOLARYNGOLOGY

## 2023-02-16 PROCEDURE — 3008F PR BODY MASS INDEX (BMI) DOCUMENTED: ICD-10-PCS | Mod: CPTII,S$GLB,, | Performed by: OTOLARYNGOLOGY

## 2023-02-16 PROCEDURE — 99999 PR PBB SHADOW E&M-EST. PATIENT-LVL IV: CPT | Mod: PBBFAC,,, | Performed by: OTOLARYNGOLOGY

## 2023-02-16 NOTE — PROGRESS NOTES
Date of Encounter: 12/13/2022  Provider: Geovanna Francois MD  Referring MD: Kate Arzate MD  PCP: ABDIRASHID Santos  Med Onc: Rusty Gaytan MD  Derm: Annalisa Calvillo MD  General Surgeon: Hugh Cameron MD    CC:  Metastatic papillary adenocarcinoma the thyroid    HPI:      Patient is a 37-year-old female with a history of papillary thyroid carcinoma status post total thyroidectomy who was referred for evaluation and treatment for metastatic disease by Dr. Kate Miller.  Patient is status post total thyroidectomy November 16, 2022.  Pathology revealed extension into the skeletal muscles and 1 positive lymph node (see path below).  Thyroglobulin was ordered which was elevated (5.0).    Patient just started synthroid one week ago.    Voice following surgery was very hoarse but is improving.  Since surgery she intermittently experiences dyspnea--at rest and with activity.  She has base line SOB but now worse post op.    2/16/2023  Patient is here today for follow-up for vocal cord mobility re-evaluation.  She has a history of papillary thyroid carcinoma with tissue remaining in the thyroid bed and metastatic lymphadenopathy seen on imaging.  At the time of patient's last visit, she had bilateral vocal cord paresis with a marginal airway.  Patient will require surgical management but re-evaluation is necessary regarding laryngeal function.  She reports that her breathing is much better--back to baseline.  Patient reports that she cannot project her voice with lower volume.    Patient also complains of 6/10 pain left neck X 1 week.  She denies any heavy lifting or exercising injury to her neck.    ROS: see HPI  Constitutional: Negative for activity change and appetite change, weight loss.   Eyes: Negative for discharge, visual changes.   Respiratory: Negative for difficulty breathing and wheezing   Cardiovascular: Negative for chest pain.   Gastrointestinal: Negative for abdominal distention and abdominal pain.    Endocrine: Negative for cold intolerance and heat intolerance.   Genitourinary: Negative for dysuria.   Musculoskeletal: Negative for gait problem, muscle pain and joint swelling.   Skin: Negative for color change and pallor; negative for skin lesions.   Neurological: Negative for syncope and weakness; no numbness face.   Psychiatric/Behavioral: Negative for agitation and confusion; negative for depression.    Physical Exam:      Constitutional  General Appearance: well nourished, well-developed, alert, oriented, in no acute distress  Communication: ability, understanding, normal  Neck  Inspection and Palpation:  Hyper pigmented collar incision; tenderness to palpation left sternocleidomastoid muscle  Larynx and Trachea: normal position; normal crepitus  Thyroid: no tenderness, enlargement or nodules  Submandibular Glands: no masses or tenderness  Lymphatic:  Anterior, Posterior, Submandibular, Submental, Supraclavicular: no lymphadenopathy palpated  Neurological  Cranial Nerves: grossly intact  General: no focal deficits  Psychiatric  Orientation: oriented to time, place and person  Mood and Affect: no depression, anxiety or agitation    PROCEDURES:   -------------------- LARYNGOSCOPY / NASOPHARYNGOSCOPY -------------------------     Pre-op DX: thyroid cancer; dyspnea  Post-op DX: same  Anesthesia: Topical Neosynephrine / Tetracaine  Indications: to look at larynx  Adverse Events: None        Procedure in Detail:     Flexible endoscopy with video was performed through the nasal passages. The nasal cavity, nasopharynx, oropharynx, hypopharynx and larynx were adequately visualized. The true vocal cords and arytenoids were examined during phonation and repose.        Operative Findings:     Nasal Cavity: Within normal limits  Nasopharynx: Within normal limits  Tongue Base: Within normal limits  Pharyngeal Walls: Within normal limits  Epiglottis / Aryepiglottic Folds: Within normal limits  Pyriform Sinus: Within  normal limits  Vocal Cords:  Decreased abduction left vocal cord; right vocal cord fully mobile-airway patent-improved  Arytenoids: Within normal limits      Path:  11/18/2022  Thyroid, total thyroidectomy:  Papillary carcinoma of thyroid, follicular variant, infiltrative   Papillary carcinomas multifocal and bilateral   Largest focus of carcinoma is in the left lobe of thyroid, 2.7 cm in size   Extrathyroidal tumor extension to involve skeletal muscle   Left lower surgical margins were involved by carcinoma  One incidental lymph node with node with metastatic papillary carcinoma   Incidental left side parathyroid gland tissue consistent with left superior and left inferior parathyroid glands with no histological abnormality.      Case summary:  Procedure: Total thyroidectomy   Tumor focality:  Multifocal   Tumor site: Left lobe and right lobe of thyroid   Tumor size:  Left lobe tumor size equals 2.7 cm   Right lobe tumor size:  4 mm   Histologic type:  Papillary carcinoma, follicular variant, infiltrative   Angio invasion:  Present   Lymphovascular invasion:  Present   Perineural invasion:  Present   Extrathyroidal extension:  Present, microscopic strap muscle invasion only with no known clinical/macroscopic evidence of invasion   Margins status:  Margins involved by carcinoma   Surgical margins involved by carcinoma:  Anterior and left posterior lobe margins   Additional lymph node status: Tumor present in regional lymph node, number of lymph nodes with tumor: 1  Marla level involved: Level 6   Size of largest metastatic deposit:  0.5 mm   Extranodal extension:  Not identified   Number of lymph nodes examined:  1  Marla level examined: Level 6 (incidental lymph node in section from thyroid isthmus) distant metastasis:  Not applicable   Pathologic stage: PT3 N1a    CT neck  FINDINGS:  The patient is undergone a total thyroidectomy.  There is soft tissue density in the thyroid bed on both sides more prominent on the  left than on the right.  This is felt to represent postsurgical scar tissue.  The carotid and jugular vessels are intact without evidence of occlusion or stenosis.     Inferior to the surgical bed are identified a round 9 mm lymph node and a 1.4 cm lymph node extending into the superior mediastinum.  At the level of the thyroid bed however in the left anterior cervical triangle there is a higher density mass measuring 3.2 cm x 2 cm best seen series 602, image 96.  This is suspicious for metastasis.  Higher in the left neck at the level of the cricoid there is a 1.6 cm node best seen on series 2 image 143 with central hypodensity which is of the higher soft tissue density seen on the other mass.  Just above and behind this is a spiculated mass measuring 1.6 cm seen on series 2, image 133.  These 3 masses are strongly suspected to be metastatic neoplasm.     There are multiple additional small lymph nodes identified in both sides of the neck however these are normal soft tissue density.  The parotid and submandibular salivary glands are bilaterally symmetric and of normal CT density.  The structures of the pharynx and larynx are within normal limits.     Impression:     Status post total thyroidectomy.  Soft tissue scar tissue is seen in the thyroid bed more prominent on the left than on the right.  There are however 3 suspicious high soft tissue density masses in the left anterior cervical triangle measuring 3.2 cm, 1.6 cm and 1.6 cm.  Two small lymph nodes of normal CT density are seen in the superior mediastinum and shoddy adenopathy is noted in the neck bilaterally.      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  If not she will have a repeat CT scan of the neck  It was again recommended that she have a left modified radical neck dissection with a bilateral level 6 neck dissection.  Due to the fact that she was recently operated, she is at very high risk of temporary or permanent hypoparathyroidism with hypocalcemia and need for calcium and vitamin-D and she is also at higher risk for injury to the recurrent laryngeal nerves with need for tracheotomy.  Risks, benefits and alternatives was discussed with the patient questions were answered.  Risks include but are not limited to bleeding, scarring, infection, hematoma, seroma, Chyle leak, swelling of the skin of the neck and face, numbness of the skin of the neck and face were 6-12 months, injury to the cranial nerves in the neck with weak lip, weak tongue, hoarseness weak upper extremity, weak shoulder weak diaphragm, need for transfusion, injury to the great vessels in the neck with bleeding stroke or death in addition to the previously mentioned above complications  Questions were answered and she is ready to proceed  Will need medical clearance  OR will be scheduled within first 2 weeks March

## 2023-02-23 ENCOUNTER — OFFICE VISIT (OUTPATIENT)
Dept: DERMATOLOGY | Facility: CLINIC | Age: 38
End: 2023-02-23
Payer: COMMERCIAL

## 2023-02-23 DIAGNOSIS — L60.8 GREEN NAILS: ICD-10-CM

## 2023-02-23 DIAGNOSIS — L60.3 NAIL DYSTROPHY: ICD-10-CM

## 2023-02-23 DIAGNOSIS — L40.9 PSORIASIS: ICD-10-CM

## 2023-02-23 DIAGNOSIS — L60.1 ONYCHOLYSIS: Primary | ICD-10-CM

## 2023-02-23 PROCEDURE — 99213 OFFICE O/P EST LOW 20 MIN: CPT | Mod: 25,S$GLB,, | Performed by: STUDENT IN AN ORGANIZED HEALTH CARE EDUCATION/TRAINING PROGRAM

## 2023-02-23 PROCEDURE — 1159F PR MEDICATION LIST DOCUMENTED IN MEDICAL RECORD: ICD-10-PCS | Mod: CPTII,S$GLB,, | Performed by: STUDENT IN AN ORGANIZED HEALTH CARE EDUCATION/TRAINING PROGRAM

## 2023-02-23 PROCEDURE — 1159F MED LIST DOCD IN RCRD: CPT | Mod: CPTII,S$GLB,, | Performed by: STUDENT IN AN ORGANIZED HEALTH CARE EDUCATION/TRAINING PROGRAM

## 2023-02-23 PROCEDURE — 99213 PR OFFICE/OUTPT VISIT, EST, LEVL III, 20-29 MIN: ICD-10-PCS | Mod: 25,S$GLB,, | Performed by: STUDENT IN AN ORGANIZED HEALTH CARE EDUCATION/TRAINING PROGRAM

## 2023-02-23 PROCEDURE — 99999 PR PBB SHADOW E&M-EST. PATIENT-LVL III: ICD-10-PCS | Mod: PBBFAC,,, | Performed by: STUDENT IN AN ORGANIZED HEALTH CARE EDUCATION/TRAINING PROGRAM

## 2023-02-23 PROCEDURE — 1160F RVW MEDS BY RX/DR IN RCRD: CPT | Mod: CPTII,S$GLB,, | Performed by: STUDENT IN AN ORGANIZED HEALTH CARE EDUCATION/TRAINING PROGRAM

## 2023-02-23 PROCEDURE — 99999 PR PBB SHADOW E&M-EST. PATIENT-LVL III: CPT | Mod: PBBFAC,,, | Performed by: STUDENT IN AN ORGANIZED HEALTH CARE EDUCATION/TRAINING PROGRAM

## 2023-02-23 PROCEDURE — 11900 PR INJECTION INTO SKIN LESIONS, UP TO 7: ICD-10-PCS | Mod: S$GLB,,, | Performed by: STUDENT IN AN ORGANIZED HEALTH CARE EDUCATION/TRAINING PROGRAM

## 2023-02-23 PROCEDURE — 1160F PR REVIEW ALL MEDS BY PRESCRIBER/CLIN PHARMACIST DOCUMENTED: ICD-10-PCS | Mod: CPTII,S$GLB,, | Performed by: STUDENT IN AN ORGANIZED HEALTH CARE EDUCATION/TRAINING PROGRAM

## 2023-02-23 PROCEDURE — 11900 INJECT SKIN LESIONS </W 7: CPT | Mod: S$GLB,,, | Performed by: STUDENT IN AN ORGANIZED HEALTH CARE EDUCATION/TRAINING PROGRAM

## 2023-02-23 RX ORDER — GENTAMICIN SULFATE 3 MG/ML
SOLUTION/ DROPS OPHTHALMIC
Qty: 15 ML | Refills: 1 | Status: SHIPPED | OUTPATIENT
Start: 2023-02-23 | End: 2023-10-25

## 2023-02-23 NOTE — PROGRESS NOTES
Subjective:       Patient ID:  Marguerite Colon is a 38 y.o. female who presents for   Chief Complaint   Patient presents with    Psoriasis     LOV: 1/5/23    Patient here today for f/u on psoriasis.  She was treated with ILK 5mg/ml in plaques on elbows at last visit and also started on vtama. Her psoriasis is much improved, only 2 spots on right elbow and 1 spot on right knee.        Recently diagnosed with thyroid cancer. Had thyroid removed in November - on synthroid. Had initial surgery but margins were positive and she will need a second surgery with radiation. Path shows papillary thyroid carcinoma, follicular variant - margins positive and + lymph node.       also had + quanti gold which was treated with rifampin. She thinks she finished the course but has not followed up with ID     Hx of depression/anxiety which has improved. Related to going through a divorce. She is now only on zoloft.      Hx of ovarian cancer in 2013- had chemo and complete hysterectomy                Review of Systems   Constitutional:  Negative for fever, chills and fatigue.   Respiratory:  Negative for cough and shortness of breath.    Gastrointestinal:  Negative for nausea and vomiting.   Skin:  Positive for itching and rash.      Objective:    Physical Exam   Constitutional: She appears well-developed and well-nourished.   Neurological: She is alert and oriented to person, place, and time.   Psychiatric: She has a normal mood and affect.   Skin:   Areas Examined (abnormalities noted in diagram):   Head / Face Inspection Performed  Neck Inspection Performed  RUE Inspected  LUE Inspection Performed  RLE Inspected  Nails and Digits Inspection Performed                Diagram Legend     Erythematous scaling macule/papule c/w actinic keratosis       Vascular papule c/w angioma      Pigmented verrucoid papule/plaque c/w seborrheic keratosis      Yellow umbilicated papule c/w sebaceous hyperplasia      Irregularly shaped tan macule c/w  lentigo     1-2 mm smooth white papules consistent with Milia      Movable subcutaneous cyst with punctum c/w epidermal inclusion cyst      Subcutaneous movable cyst c/w pilar cyst      Firm pink to brown papule c/w dermatofibroma      Pedunculated fleshy papule(s) c/w skin tag(s)      Evenly pigmented macule c/w junctional nevus     Mildly variegated pigmented, slightly irregular-bordered macule c/w mildly atypical nevus      Flesh colored to evenly pigmented papule c/w intradermal nevus       Pink pearly papule/plaque c/w basal cell carcinoma      Erythematous hyperkeratotic cursted plaque c/w SCC      Surgical scar with no sign of skin cancer recurrence      Open and closed comedones      Inflammatory papules and pustules      Verrucoid papule consistent consistent with wart     Erythematous eczematous patches and plaques     Dystrophic onycholytic nail with subungual debris c/w onychomycosis     Umbilicated papule    Erythematous-base heme-crusted tan verrucoid plaque consistent with inflamed seborrheic keratosis     Erythematous Silvery Scaling Plaque c/w Psoriasis     See annotation      Assessment / Plan:        Onycholysis  Green nails  Nail dystrophy  -     gentamicin (GARAMYCIN) 0.3 % ophthalmic solution; Use on AA BID  Dispense: 15 mL; Refill: 1  Recommend 1 capful of white vinegar in warm container of water  Apply gentamicin BID to AA until f/u  Appears to have pseudomonas under nail, discussed trimming nail short and keep nails dry, avoid nail salon, wear gloves w/ wet work, may also have component of nail fungus but will try to clear likely bacterial component first    Psoriasis, right elbow and right knee  Intralesional Kenalog 5mg/cc (1 cc total) injected into 3 lesions on the right elbow and right knee today after obtaining verbal consent including risk of surrounding hypopigmentation. Patient tolerated procedure well.    Units: 1  NDC for Kenalog 10mg/cc:  6595-9782-76  Continue vtama on AA once  daily             No follow-ups on file.

## 2023-03-07 ENCOUNTER — TELEPHONE (OUTPATIENT)
Dept: HEMATOLOGY/ONCOLOGY | Facility: CLINIC | Age: 38
End: 2023-03-07
Payer: COMMERCIAL

## 2023-03-07 DIAGNOSIS — C73 THYROID CANCER: Primary | ICD-10-CM

## 2023-03-07 NOTE — TELEPHONE ENCOUNTER
----- Message from Cassia Coffman sent at 3/7/2023  1:33 PM CST -----  Regarding: clearance for surgery  Contact: patient  Type: Needs Medical Advice  Who Called:  patient  Symptoms (please be specific):    How long has patient had these symptoms:    Pharmacy name and phone #:    Best Call Back Number: [phone] (home) [phone] (work)  Additional Information: Patient is calling regarding the status of the medical clearance. Patient has been waiting for 3 weeks. Please call patient to advise.Thanks!

## 2023-03-07 NOTE — TELEPHONE ENCOUNTER
Request for medical clearance for pt to have surgery with Dr Francois on 3/30/23 faxed to [facility] at [phone]. Called ABDIRASHID's office at [phone] and left my contact information to call if fax not received or for any questions.

## 2023-03-08 ENCOUNTER — TELEPHONE (OUTPATIENT)
Dept: HEMATOLOGY/ONCOLOGY | Facility: CLINIC | Age: 38
End: 2023-03-08
Payer: COMMERCIAL

## 2023-03-08 NOTE — TELEPHONE ENCOUNTER
Called pt to discuss surgery and appointment dates and times.  Noted that pt has cancelled her PAT appt and requesting post op appts canceled. LVM with contact information for pt to call.

## 2023-03-09 ENCOUNTER — TELEPHONE (OUTPATIENT)
Dept: HEMATOLOGY/ONCOLOGY | Facility: CLINIC | Age: 38
End: 2023-03-09
Payer: COMMERCIAL

## 2023-03-09 ENCOUNTER — TELEPHONE (OUTPATIENT)
Dept: FAMILY MEDICINE | Facility: CLINIC | Age: 38
End: 2023-03-09

## 2023-03-09 NOTE — TELEPHONE ENCOUNTER
Spoke with patient regarding fax received for lymphodenectomy surgery clearance with Dr. Geovanna Francois. Patient declines appt, says she is no longer seeing Dr. Francois and has chosen to seek recommendations elsewhere.

## 2023-03-09 NOTE — TELEPHONE ENCOUNTER
Dr Francois notified of pt's request.  Dr Francois would like to speak with pt.    Called pt and notified her that we did receive her request and Dr Francois would like to speak with her.  Pt states that the best time to call her is after 3:30 pm.  Will notify Dr Francois.----- Message from Sherlyn Greenwood sent at 3/9/2023  9:49 AM CST -----  Type:Needs Medical Advice    Who Called:PT  Best call back number:[phone]  Additional Info: Requesting a call back regarding PT would like to cancel her surgery date of 3/30 and any related appts.  Please Advise- Thank you

## 2023-04-14 DIAGNOSIS — C73 PAPILLARY THYROID CARCINOMA: Primary | ICD-10-CM

## 2023-04-21 ENCOUNTER — HOSPITAL ENCOUNTER (OUTPATIENT)
Dept: RADIOLOGY | Facility: HOSPITAL | Age: 38
Discharge: HOME | End: 2023-04-21
Attending: PHYSICIAN ASSISTANT
Payer: COMMERCIAL

## 2023-04-21 ENCOUNTER — HOSPITAL ENCOUNTER (OUTPATIENT)
Dept: RADIOLOGY | Facility: HOSPITAL | Age: 38
Discharge: HOME | End: 2023-04-21
Attending: SURGERY
Payer: COMMERCIAL

## 2023-04-21 DIAGNOSIS — C73 PAPILLARY THYROID CARCINOMA: ICD-10-CM

## 2023-04-21 PROCEDURE — 25500020 PHARM REV CODE 255: Performed by: PHYSICIAN ASSISTANT

## 2023-04-21 PROCEDURE — 71250 CT THORAX DX C-: CPT | Mod: TC

## 2023-04-21 PROCEDURE — A9585 GADOBUTROL INJECTION: HCPCS | Performed by: PHYSICIAN ASSISTANT

## 2023-04-21 PROCEDURE — 70543 MRI ORBT/FAC/NCK W/O &W/DYE: CPT | Mod: TC

## 2023-04-21 RX ORDER — GADOBUTROL 604.72 MG/ML
14 INJECTION INTRAVENOUS
Status: COMPLETED | OUTPATIENT
Start: 2023-04-21 | End: 2023-04-21

## 2023-04-21 RX ADMIN — GADOBUTROL 14 ML: 604.72 INJECTION INTRAVENOUS at 04:04

## 2023-04-27 NOTE — PLAN OF CARE
[facility]  Physical Therapy Initial Evaluation    Name: Marguerite Colon  Clinic Number: [id number]    Therapy Diagnosis:   Encounter Diagnoses   Name Primary?    Gait abnormality     Posture abnormality      Physician: Peter Kauffman MD    Physician Orders: PT Eval and Treat   Medical Diagnosis from Referral: M54.41 (ICD-10-CM) - Acute right-sided low back pain with right-sided sciatica  Evaluation Date: 10/28/2019  Authorization Period Expiration: 12/31/2019  Plan of Care Expiration: 12/31/2019  Visit # / Visits authorized: 1/ 20    Time In: 1615  Time Out: 1701  Total Billable Time: 46 minutes    Precautions: Diabetes and h/o CA    Subjective   Date of onset: 10/7/2019  History of current condition - Marguerite Colon reports: insidious onset low back pain 3 weeks ago. Reports waking up with pain in the low back and radiating to RLE.  Reports R sided neck pain started at the same time and radiates to R elbow and down the back to R shoulder blade and low back. No improvement with muscle relaxers or after stopping statin. She does report prior history of chronic low back pain, but reports the pain was mild and intermittent.        Medical History:   Past Medical History:   Diagnosis Date    Anxiety     Bilateral headaches 4/1/2016    Cirrhosis     Diabetes mellitus     type 2    Hepatomegaly Aug 2014    Morbidly obese     Obesity, Class III, BMI 40-49.9 (morbid obesity) 4/1/2016    Ovarian cancer 12/2012    grade 1 endometrioid adenoca of the ovary    Pancreatitis     Small bowel obstruction due to adhesions Dec 2015     Thyroid disease     hypothyroid    Thyroid nodule        Surgical History:   Marguerite Colon  has a past surgical history that includes left salpingo oophrectomy (7/10); leep; partial right oophrectomy (12/12); Abdominal adhesion surgery; pr removal of ovary/tube(s); Omentectomy; Hysterectomy (2 /25/2013); Cholecystectomy (dec 2015); x lap (12/28/2015); and Hernia repair  (12/28/2015).    Medications:   Marguerite AYALA has a current medication list which includes the following prescription(s): alprazolam, cyclobenzaprine, gabapentin, humulin r u-500 (conc) insulin, pen needle, diabetic, magnesium oxide, metformin, ozempic, sertraline, and triamcinolone acetonide 0.1%.    Allergies:   Review of patient's allergies indicates:   Allergen Reactions    Carboplatin      Had to go to ER.     Tuberculin ppd Rash     localized        Imaging, xray lumbar spine 10/16/2019:  Impression       Mild degenerative changes of the lumbar spine at L1-2, L4-5 and L5-S1 without subluxation         Prior Therapy: Remote history of PT for low back pain; reports only going to a few sessions due to increased pain  Social History/Occupation:  Employed as a  at [employer]  Prior Level of Function: Independent  Current Level of Function: Independent    Pain:  Location:  Low back to R lateral hip and posterior thigh to R knee; Posterior R sided neck to shoulder blades and low back, radiation to R elbow  Description: sharp, stabbing, spasm, throbbing  Activities Which Increase Pain: prolonged standing/walking, prolonged sitting, worse at night  Activities Which Decrease Pain: lying on back, pain medication (norco)  Pain Scale: 3/10 at best 8/10 now  10/10 at worst      Pts goals: Improve pain/function    Objective     Posture/Appearance: Fwd head position, rounded shoulders, R shoulder depressed  Palpation: mild tenderness lumbar paraspinals  Sensation: Reports intermittent numbness RLE, bilateral hands, chronic numbness/tingling bilateral feet since chemotherapy    ROM/Strength:       Lumbar AROM: Pain/Dysfunction with Movement:   Flexion 24*    Extension 10*    Right side bending 75%    Left side bending 75%      *Increased pain with extension>flexion    Lower Extremity Range of Motion:  Right Lower Extremity: WFL  Left Lower Extremity: WFL    Lower Extremity Strength:  Right LE  Left LE    Hip  flexion: 4/5 Hip flexion: 4+/5   Knee extension: 4+/5 Knee extension: 5/5   Knee flexion: 4/5 Knee flexion: 4+/5   Ankle dorsiflexion:  4+/5 Ankle dorsiflexion: 4+/5     *Increased R sided low back pain and radicular symptoms with resistive testing RLE     Transfers: Independent  Gait: independent; decreased cyndi  Special tests: SLR + R    Functional Outcome Measure: The Revised Oswestry Low Back Pain Questionnaire: 29/50=58% impairment      CMS Impairment/Limitation/Restriction for FOTO Lumbar Survey    Therapist reviewed FOTO scores for Marguerite Colon on 10/28/2019.   FOTO documents entered into [employer] - see Media section.    Limitation Score: 54%  Category: Other    Current : CK = at least 40% but < 60% impaired, limited or restricted  Goal: CJ = at least 20% but < 40% impaired, limited or restricted           TREATMENT   Treatment Time In: 1615  Treatment Time Out: 1701  Total Treatment time separate from Evaluation: 0 minutes    Home Exercises and Patient Education Provided    Education provided:   - Role/goal PT, POC  -Instructed in avoidance of bending, lifting, twisting for back protection strategy    Written Home Exercises Provided: yes.  Exercises were reviewed and Marguerite Colon was able to demonstrate them prior to the end of the session.  Marguerite Colon demonstrated good  understanding of the education provided.     See EMR under Media for exercises provided 10/28/2019.    Assessment   Marguerite AYALA is a 34 y.o. female referred to outpatient Physical Therapy with a medical diagnosis of Acute right-sided low back pain with right-sided sciatica. Pt presents with weakness, decreased ROM, and impaired functional mobility.     Pt prognosis is Good.   Pt will benefit from skilled outpatient Physical Therapy to address the deficits stated above and in the chart below, provide pt/family education, and to maximize pt's level of independence.     Plan of care discussed with patient: Yes  Pt's spiritual, cultural and educational  needs considered and patient is agreeable to the plan of care and goals as stated below:     Anticipated Barriers for therapy: none noted    Medical Necessity is demonstrated by the following  History  Co-morbidities and personal factors that may impact the plan of care Co-morbidities:   diabetes, high BMI and history of cancer    Personal Factors:   no deficits     low   Examination  Body Structures and Functions, activity limitations and participation restrictions that may impact the plan of care Body Regions:   neck  back    Body Systems:    ROM  strength    Participation Restrictions:       Activity limitations:   Learning and applying knowledge  no deficits    General Tasks and Commands  no deficits    Communication  no deficits    Mobility  no deficits    Self care  no deficits    Domestic Life  no deficits    Interactions/Relationships  no deficits    Life Areas  no deficits    Community and Social Life  no deficits         low   Clinical Presentation stable and uncomplicated low   Decision Making/ Complexity Score: low     Goals:  Short Term Goals: 3 weeks   1) Pt will initiate HEP  2) Pt will improve strength 1/2 muscle grade  3) Pt will report decreased radiation into R UE and R LE    Long Term Goals: 6 weeks   1) Pt will be I with HEP  2) Pt will return to I/ADL's with 4+/5 strength and pain <4/10  3) Pt will improve Oswestry to <30% impairment    Plan   Plan of care Certification: 10/28/2019 to 12/31/2019.    Outpatient Physical Therapy 2 times weekly for 6 weeks to include the following interventions: Electrical Stimulation IFC PRN, Gait Training, Manual Therapy, Moist Heat/ Ice, Neuromuscular Re-ed, Patient Education, Therapeutic Activites, Therapeutic Exercise and dry needling PRN.     Karrie Phillip, PT       Normal for race

## 2023-06-06 ENCOUNTER — PATIENT MESSAGE (OUTPATIENT)
Dept: DERMATOLOGY | Facility: CLINIC | Age: 38
End: 2023-06-06
Payer: COMMERCIAL

## 2023-07-13 DIAGNOSIS — Z12.31 OTHER SCREENING MAMMOGRAM: ICD-10-CM

## 2023-10-02 ENCOUNTER — PATIENT MESSAGE (OUTPATIENT)
Dept: FAMILY MEDICINE | Facility: CLINIC | Age: 38
End: 2023-10-02

## 2023-10-25 ENCOUNTER — OFFICE VISIT (OUTPATIENT)
Dept: FAMILY MEDICINE | Facility: CLINIC | Age: 38
End: 2023-10-25
Payer: MEDICAID

## 2023-10-25 VITALS
HEIGHT: 67 IN | TEMPERATURE: 99 F | SYSTOLIC BLOOD PRESSURE: 124 MMHG | HEART RATE: 64 BPM | BODY MASS INDEX: 45.99 KG/M2 | WEIGHT: 293 LBS | RESPIRATION RATE: 20 BRPM | DIASTOLIC BLOOD PRESSURE: 88 MMHG | OXYGEN SATURATION: 94 %

## 2023-10-25 DIAGNOSIS — F17.210 CIGARETTE SMOKER: ICD-10-CM

## 2023-10-25 DIAGNOSIS — Z79.4 TYPE 2 DIABETES MELLITUS WITH HYPERGLYCEMIA, WITH LONG-TERM CURRENT USE OF INSULIN: ICD-10-CM

## 2023-10-25 DIAGNOSIS — F32.9 REACTIVE DEPRESSION: ICD-10-CM

## 2023-10-25 DIAGNOSIS — C73 PAPILLARY THYROID CARCINOMA: Primary | ICD-10-CM

## 2023-10-25 DIAGNOSIS — E03.9 ACQUIRED HYPOTHYROIDISM: ICD-10-CM

## 2023-10-25 DIAGNOSIS — E66.01 MORBID OBESITY WITH BMI OF 50.0-59.9, ADULT: ICD-10-CM

## 2023-10-25 DIAGNOSIS — E11.65 TYPE 2 DIABETES MELLITUS WITH HYPERGLYCEMIA, WITH LONG-TERM CURRENT USE OF INSULIN: ICD-10-CM

## 2023-10-25 DIAGNOSIS — E78.2 MIXED HYPERLIPIDEMIA: ICD-10-CM

## 2023-10-25 PROBLEM — M54.2 NECK PAIN ON LEFT SIDE: Status: RESOLVED | Noted: 2023-02-16 | Resolved: 2023-10-25

## 2023-10-25 PROCEDURE — 99214 OFFICE O/P EST MOD 30 MIN: CPT | Mod: S$PBB,,, | Performed by: NURSE PRACTITIONER

## 2023-10-25 PROCEDURE — 99214 PR OFFICE/OUTPT VISIT, EST, LEVL IV, 30-39 MIN: ICD-10-PCS | Mod: S$PBB,,, | Performed by: NURSE PRACTITIONER

## 2023-10-25 PROCEDURE — 3074F SYST BP LT 130 MM HG: CPT | Mod: CPTII,,, | Performed by: NURSE PRACTITIONER

## 2023-10-25 PROCEDURE — 3074F PR MOST RECENT SYSTOLIC BLOOD PRESSURE < 130 MM HG: ICD-10-PCS | Mod: CPTII,,, | Performed by: NURSE PRACTITIONER

## 2023-10-25 PROCEDURE — 1160F PR REVIEW ALL MEDS BY PRESCRIBER/CLIN PHARMACIST DOCUMENTED: ICD-10-PCS | Mod: CPTII,,, | Performed by: NURSE PRACTITIONER

## 2023-10-25 PROCEDURE — 99214 OFFICE O/P EST MOD 30 MIN: CPT | Performed by: NURSE PRACTITIONER

## 2023-10-25 PROCEDURE — 3008F PR BODY MASS INDEX (BMI) DOCUMENTED: ICD-10-PCS | Mod: CPTII,,, | Performed by: NURSE PRACTITIONER

## 2023-10-25 PROCEDURE — 3008F BODY MASS INDEX DOCD: CPT | Mod: CPTII,,, | Performed by: NURSE PRACTITIONER

## 2023-10-25 PROCEDURE — 1159F MED LIST DOCD IN RCRD: CPT | Mod: CPTII,,, | Performed by: NURSE PRACTITIONER

## 2023-10-25 PROCEDURE — 3079F PR MOST RECENT DIASTOLIC BLOOD PRESSURE 80-89 MM HG: ICD-10-PCS | Mod: CPTII,,, | Performed by: NURSE PRACTITIONER

## 2023-10-25 PROCEDURE — 1160F RVW MEDS BY RX/DR IN RCRD: CPT | Mod: CPTII,,, | Performed by: NURSE PRACTITIONER

## 2023-10-25 PROCEDURE — 1159F PR MEDICATION LIST DOCUMENTED IN MEDICAL RECORD: ICD-10-PCS | Mod: CPTII,,, | Performed by: NURSE PRACTITIONER

## 2023-10-25 PROCEDURE — 3079F DIAST BP 80-89 MM HG: CPT | Mod: CPTII,,, | Performed by: NURSE PRACTITIONER

## 2023-10-25 RX ORDER — INSULIN HUMAN 500 [IU]/ML
INJECTION, SOLUTION SUBCUTANEOUS
Qty: 4 PEN | Refills: 1 | Status: SHIPPED | OUTPATIENT
Start: 2023-10-25 | End: 2023-12-18

## 2023-10-25 RX ORDER — BUPROPION HYDROCHLORIDE 150 MG/1
150 TABLET ORAL DAILY
Qty: 30 TABLET | Refills: 2 | Status: SHIPPED | OUTPATIENT
Start: 2023-10-25 | End: 2024-01-17

## 2023-10-25 RX ORDER — METFORMIN HYDROCHLORIDE 500 MG/1
500 TABLET ORAL 2 TIMES DAILY
Qty: 180 TABLET | Refills: 1 | Status: SHIPPED | OUTPATIENT
Start: 2023-10-25 | End: 2024-02-15

## 2023-10-25 RX ORDER — LEVOTHYROXINE SODIUM 150 MCG
150 TABLET ORAL
Qty: 30 TABLET | Refills: 0 | Status: SHIPPED | OUTPATIENT
Start: 2023-10-25 | End: 2023-11-20 | Stop reason: DRUGHIGH

## 2023-10-25 RX ORDER — SEMAGLUTIDE 2.68 MG/ML
2 INJECTION, SOLUTION SUBCUTANEOUS
Qty: 3 ML | Refills: 1 | Status: SHIPPED | OUTPATIENT
Start: 2023-10-25 | End: 2023-11-20

## 2023-10-25 RX ORDER — ATORVASTATIN CALCIUM 20 MG/1
20 TABLET, FILM COATED ORAL NIGHTLY
Qty: 90 TABLET | Refills: 1 | Status: SHIPPED | OUTPATIENT
Start: 2023-10-25 | End: 2024-02-15

## 2023-10-25 NOTE — PROGRESS NOTES
"    SUBJECTIVE:      Patient ID: Marguerite Colon is a 38 y.o. female.    Chief Complaint: Diabetes (Declines flu vaccine)    Marguerite is here today for follow-up on diabetes.  Since last visit, she has had an insurance change and is no longer able to see her endocrinology provider who has been managing her type 2 diabetes.  She is taking Humulin 3 times daily before meals and believes her last A1c was around 8.  Due for lab work at this time.  She is continuing to follow-up with Dr. Anderson at [facility] for her thyroid cancer and recently had labs completed.  Only thyroglobulin levels were drawn last week.  She is asking for a new endocrinology referral.  She is also asking for a bariatric medicine surgery referral as her insurance plan will cover this.  She is taking the Zoloft daily but does not feel like it is working well anymore for her depression. Feels "flat" all the time. Denies issues with sleeping or insomnia. Using CPAP nightly for ORI. Reports minimal anxiety at this time and is asking to change to something else.  Denies suicidal or homicidal ideations or plans.  She previously took Wellbutrin for smoking cessation and depression which worked well for her and she was able to reduce her smoking intake.     Overdue health maintenance discussed today, patient refuses recommended flu shot    Depression  Visit Type: follow-up  Patient presents with the following symptoms: depressed mood, fatigue and weight gain.  Patient is not experiencing: chest pain, confusion, dizziness, excessive worry, insomnia, nausea, nervousness/anxiety, obsessions, palpitations, shortness of breath, suicidal ideas, suicidal planning and weight loss.  Frequency of symptoms: most days   Severity: moderate   Sleep quality: fair  Nighttime awakenings: occasional  Compliance with medications:  %        Family History   Problem Relation Age of Onset    Diabetes Mother     Heart disease Mother     Stroke Mother     Hypertension Mother     " "Diabetes Father     Ovarian cancer Neg Hx     Uterine cancer Neg Hx     Breast cancer Neg Hx     Colon cancer Neg Hx       Social History     Socioeconomic History    Marital status: Other   Tobacco Use    Smoking status: Every Day     Current packs/day: 0.25     Average packs/day: 0.3 packs/day for 20.0 years (5.0 ttl pk-yrs)     Types: Cigarettes    Smokeless tobacco: Never   Substance and Sexual Activity    Alcohol use: No    Drug use: No    Sexual activity: Not Currently     Partners: Male     Social Determinants of Health     Stress: No Stress Concern Present (10/16/2019)    [facility] of Occupational Health - Occupational Stress Questionnaire     Feeling of Stress : Not at all     Current Outpatient Medications   Medication Sig Dispense Refill    OZEMPIC 2 mg/dose (8 mg/3 mL) PnIj Inject 2 mg into the skin every 7 days.      tapinarof (VTAMA) 1 % Crea Use on AA once daily 60 g 1    atorvastatin (LIPITOR) 20 MG tablet Take 1 tablet (20 mg total) by mouth every evening. 90 tablet 1    buPROPion (WELLBUTRIN XL) 150 MG TB24 tablet Take 1 tablet (150 mg total) by mouth once daily. 30 tablet 2    HUMULIN R U-500, CONC, KWIKPEN 500 unit/mL (3 mL) insulin pen Take 60 units SQ before breakfast, take 75 units SQ before lunch and take 80 units SQ before dinner 4 pen 1    insulin needles, disposable, 30 x 1/3 " Ndle Uses 4 daily, on multiple daily insulin injections 150 each 12    metFORMIN (GLUCOPHAGE) 500 MG tablet Take 1 tablet (500 mg total) by mouth 2 (two) times daily. 180 tablet 1    SYNTHROID 150 mcg tablet Take 1 tablet (150 mcg total) by mouth before breakfast. 30 tablet 0     No current facility-administered medications for this visit.     Review of patient's allergies indicates:   Allergen Reactions    Carboplatin      Had to go to ER.     Tuberculin ppd Rash     localized      Past Medical History:   Diagnosis Date    Anxiety     Arthritis     Back pain     Bilateral headaches 04/01/2016    Cirrhosis  "    Diabetes mellitus     type 2    Hepatomegaly 08/01/2014    Morbidly obese     Obesity, Class III, BMI 40-49.9 (morbid obesity) 04/01/2016    Ovarian cancer 12/01/2012    grade 1 endometrioid adenoca of the ovary    Pancreatitis     Sleep apnea     Small bowel obstruction due to adhesions 12/01/2015    TB (pulmonary tuberculosis)     Thyroid disease     hypothyroid    Thyroid nodule      Past Surgical History:   Procedure Laterality Date    ABDOMINAL ADHESION SURGERY      done at same time with ovarian cystectomy    CHOLECYSTECTOMY  12/2015    HERNIA REPAIR  12/28/2015    25 x 20 cm Venrtralex ST mesh for repair done at time of [facility] for SBO.     HYSTERECTOMY  02/25/2013    adeline rso. prior lso. ovarian ca    leep      positive margins    left salpingo oophrectomy  07/2010    hemorrhagic cyst    OMENTECTOMY      partial right oophrectomy  12/2012    LMP of ovary    AK REMOVAL OF OVARY/TUBE(S)      THYROIDECTOMY Bilateral 11/16/2022    Procedure: THYROIDECTOMY;  Surgeon: Hugh Toussaint III, MD;  [facility];  Service: General;  Laterality: Bilateral;    x lap  12/28/2015    post open cholecystectomy 2/21/2015.        Review of Systems   Constitutional:  Positive for fatigue, unexpected weight change (gain) and weight gain. Negative for activity change, appetite change, chills, fever and weight loss.   HENT:  Positive for trouble swallowing. Negative for hearing loss and rhinorrhea.    Eyes:  Negative for discharge and visual disturbance.   Respiratory:  Positive for apnea (uses CPAP nightly). Negative for chest tightness, shortness of breath and wheezing.    Cardiovascular:  Negative for chest pain, palpitations and leg swelling.   Gastrointestinal:  Positive for diarrhea. Negative for abdominal pain, blood in stool, constipation, nausea and vomiting.   Genitourinary:  Negative for difficulty urinating, dysuria, hematuria and menstrual problem.   Musculoskeletal:  Negative for arthralgias, joint swelling and  "neck pain.   Skin:  Negative for rash and wound.   Neurological:  Negative for numbness.   Hematological:  Negative for adenopathy.   Psychiatric/Behavioral:  Positive for dysphoric mood. Negative for confusion, sleep disturbance and suicidal ideas. The patient is not nervous/anxious and does not have insomnia.       OBJECTIVE:      Vitals:    10/25/23 1408   BP: 124/88   BP Location: Right forearm   Patient Position: Sitting   BP Method: Large (Manual)   Pulse: 64   Resp: 20   Temp: 98.5 °F (36.9 °C)   TempSrc: Oral   SpO2: (!) 94%   Weight: (!) 151.7 kg (334 lb 6.4 oz)   Height: 5' 7" (1.702 m)     Physical Exam  Vitals and nursing note reviewed.   Constitutional:       General: She is not in acute distress.     Appearance: Normal appearance. She is not ill-appearing.      Comments: Morbid obesity   HENT:      Nose: Nose normal.      Mouth/Throat:      Mouth: Mucous membranes are moist.   Eyes:      General: No scleral icterus.     Pupils: Pupils are equal, round, and reactive to light.   Neck:      Comments: Thyroidectomy   Cardiovascular:      Rate and Rhythm: Normal rate and regular rhythm.   Pulmonary:      Effort: Pulmonary effort is normal. No respiratory distress.      Breath sounds: Normal breath sounds. No wheezing or rales.   Musculoskeletal:      Cervical back: Normal range of motion and neck supple.   Lymphadenopathy:      Cervical: No cervical adenopathy.   Skin:     General: Skin is warm and dry.      Coloration: Skin is not jaundiced or pale.   Neurological:      Mental Status: She is alert and oriented to person, place, and time.   Psychiatric:         Mood and Affect: Mood normal.         Behavior: Behavior normal.         Thought Content: Thought content normal.         Judgment: Judgment normal.        Assessment:       1. Papillary thyroid carcinoma    2. Acquired hypothyroidism    3. Reactive depression    4. Mixed hyperlipidemia    5. Type 2 diabetes mellitus with hyperglycemia, with " long-term current use of insulin    6. Morbid obesity with BMI of 50.0-59.9, adult    7. Cigarette smoker        Plan:       Papillary thyroid carcinoma  -     TSH w/reflex to FT4; Future; Expected date: 10/25/2023  -     Ambulatory referral/consult to Endocrinology; Future; Expected date: 11/01/2023  -no history of medullary thyroid cancer or other family members with thyroid cancer; patient will need to continue follow-up with endocrinology; discussed no contraindications for Ozempic therapy with her history, but she will need to see a specialist and she voiced understanding    Acquired hypothyroidism  -     SYNTHROID 150 mcg tablet; Take 1 tablet (150 mcg total) by mouth before breakfast.  Dispense: 30 tablet; Refill: 0  -recheck TSH and free T4 if needed, short-term refills provided    Reactive depression  -     CBC Auto Differential; Future; Expected date: 10/25/2023  -     Comprehensive Metabolic Panel; Future; Expected date: 10/25/2023  -     buPROPion (WELLBUTRIN XL) 150 MG TB24 tablet; Take 1 tablet (150 mg total) by mouth once daily.  Dispense: 30 tablet; Refill: 2  -wean off Zoloft discuss, half tablet of 50 mg nightly x1 week, then 25 mg dose every other night for a week and stop; start trial of Wellbutrin 150 once daily, reminded about possible side effects and proper use of medication; may need to add on something for anxiety at follow-up visit, recheck in 1 month    Mixed hyperlipidemia  -     Comprehensive Metabolic Panel; Future; Expected date: 10/25/2023  -     Lipid Panel; Future; Expected date: 10/25/2023  -     atorvastatin (LIPITOR) 20 MG tablet; Take 1 tablet (20 mg total) by mouth every evening.  Dispense: 90 tablet; Refill: 1  -continue statin, recheck labs    Type 2 diabetes mellitus with hyperglycemia, with long-term current use of insulin  -     Comprehensive Metabolic Panel; Future; Expected date: 10/25/2023  -     Lipid Panel; Future; Expected date: 10/25/2023  -      Microalbumin/Creatinine Ratio, Urine; Future; Expected date: 10/25/2023  -     Hemoglobin A1C; Future; Expected date: 10/25/2023  -     Ambulatory referral/consult to Endocrinology; Future; Expected date: 11/01/2023  -     atorvastatin (LIPITOR) 20 MG tablet; Take 1 tablet (20 mg total) by mouth every evening.  Dispense: 90 tablet; Refill: 1  -     metFORMIN (GLUCOPHAGE) 500 MG tablet; Take 1 tablet (500 mg total) by mouth 2 (two) times daily.  Dispense: 180 tablet; Refill: 1  -     HUMULIN R U-500, CONC, KWIKPEN 500 unit/mL (3 mL) insulin pen; Take 60 units SQ before breakfast, take 75 units SQ before lunch and take 80 units SQ before dinner  Dispense: 4 pen ; Refill: 1  -will need to recheck labs, refill sent until review; patient is on high doses of insulin, will need to follow with Endocrinology; may take Ozempic if no history of medullary thyroid cancer which I did not see on her review of her chart or pathology reports; declines recommended flu vaccine, reminded about eye exam    Morbid obesity with BMI of 50.0-59.9, adult   -will notify me of who she needs to see for bariatric medicine that is covered by her insurance plan, will message me about referral    Cigarette smoker   -hoping Wellbutrin will help patient cut down and stop      Follow up in about 1 month (around 11/25/2023) for f/u depression, DM, thyroid .      10/25/2023 Eleni Gooden, ELIZABETH, FNP    This note was created using [employer] voice recognition software that occasionally misinterprets phrases or words.

## 2023-11-05 LAB
ALBUMIN SERPL-MCNC: 3.9 G/DL (ref 3.6–5.1)
ALBUMIN/CREAT UR: 2 MCG/MG CREAT
ALBUMIN/GLOB SERPL: 1.1 (CALC) (ref 1–2.5)
ALP SERPL-CCNC: 102 U/L (ref 31–125)
ALT SERPL-CCNC: 18 U/L (ref 6–29)
AST SERPL-CCNC: 19 U/L (ref 10–30)
BASOPHILS # BLD AUTO: 51 CELLS/UL (ref 0–200)
BASOPHILS NFR BLD AUTO: 0.8 %
BILIRUB SERPL-MCNC: 0.6 MG/DL (ref 0.2–1.2)
BUN SERPL-MCNC: 16 MG/DL (ref 7–25)
BUN/CREAT SERPL: ABNORMAL (CALC) (ref 6–22)
CALCIUM SERPL-MCNC: 9.5 MG/DL (ref 8.6–10.2)
CHLORIDE SERPL-SCNC: 102 MMOL/L (ref 98–110)
CHOLEST SERPL-MCNC: 146 MG/DL
CHOLEST/HDLC SERPL: 3.8 (CALC)
CO2 SERPL-SCNC: 30 MMOL/L (ref 20–32)
CREAT SERPL-MCNC: 0.75 MG/DL (ref 0.5–0.99)
CREAT UR-MCNC: 229 MG/DL (ref 20–275)
EGFR: 103 ML/MIN/1.73M2
EOSINOPHIL # BLD AUTO: 262 CELLS/UL (ref 15–500)
EOSINOPHIL NFR BLD AUTO: 4.1 %
ERYTHROCYTE [DISTWIDTH] IN BLOOD BY AUTOMATED COUNT: 13.7 % (ref 11–15)
GLOBULIN SER CALC-MCNC: 3.4 G/DL (CALC) (ref 1.9–3.7)
GLUCOSE SERPL-MCNC: 163 MG/DL (ref 65–99)
HBA1C MFR BLD: 9.3 % OF TOTAL HGB
HCT VFR BLD AUTO: 42.6 % (ref 35–45)
HDLC SERPL-MCNC: 38 MG/DL
HGB BLD-MCNC: 13.7 G/DL (ref 11.7–15.5)
LDLC SERPL CALC-MCNC: 91 MG/DL (CALC)
LYMPHOCYTES # BLD AUTO: 2118 CELLS/UL (ref 850–3900)
LYMPHOCYTES NFR BLD AUTO: 33.1 %
MCH RBC QN AUTO: 29.5 PG (ref 27–33)
MCHC RBC AUTO-ENTMCNC: 32.2 G/DL (ref 32–36)
MCV RBC AUTO: 91.8 FL (ref 80–100)
MICROALBUMIN UR-MCNC: 0.5 MG/DL
MONOCYTES # BLD AUTO: 352 CELLS/UL (ref 200–950)
MONOCYTES NFR BLD AUTO: 5.5 %
NEUTROPHILS # BLD AUTO: 3616 CELLS/UL (ref 1500–7800)
NEUTROPHILS NFR BLD AUTO: 56.5 %
NONHDLC SERPL-MCNC: 108 MG/DL (CALC)
PLATELET # BLD AUTO: 178 THOUSAND/UL (ref 140–400)
PMV BLD REES-ECKER: 11.3 FL (ref 7.5–12.5)
POTASSIUM SERPL-SCNC: 4.4 MMOL/L (ref 3.5–5.3)
PROT SERPL-MCNC: 7.3 G/DL (ref 6.1–8.1)
RBC # BLD AUTO: 4.64 MILLION/UL (ref 3.8–5.1)
SODIUM SERPL-SCNC: 139 MMOL/L (ref 135–146)
T4 FREE SERPL-MCNC: 1 NG/DL (ref 0.8–1.8)
TRIGL SERPL-MCNC: 78 MG/DL
TSH SERPL-ACNC: 31.86 MIU/L
WBC # BLD AUTO: 6.4 THOUSAND/UL (ref 3.8–10.8)

## 2023-11-17 DIAGNOSIS — E03.9 ACQUIRED HYPOTHYROIDISM: ICD-10-CM

## 2023-11-20 ENCOUNTER — OFFICE VISIT (OUTPATIENT)
Dept: FAMILY MEDICINE | Facility: CLINIC | Age: 38
End: 2023-11-20
Payer: MEDICAID

## 2023-11-20 VITALS
RESPIRATION RATE: 20 BRPM | WEIGHT: 293 LBS | BODY MASS INDEX: 45.99 KG/M2 | TEMPERATURE: 98 F | DIASTOLIC BLOOD PRESSURE: 88 MMHG | SYSTOLIC BLOOD PRESSURE: 132 MMHG | HEIGHT: 67 IN | HEART RATE: 62 BPM | OXYGEN SATURATION: 97 %

## 2023-11-20 DIAGNOSIS — E11.65 TYPE 2 DIABETES MELLITUS WITH HYPERGLYCEMIA, WITH LONG-TERM CURRENT USE OF INSULIN: ICD-10-CM

## 2023-11-20 DIAGNOSIS — F17.210 CIGARETTE SMOKER: ICD-10-CM

## 2023-11-20 DIAGNOSIS — E03.9 ACQUIRED HYPOTHYROIDISM: Primary | ICD-10-CM

## 2023-11-20 DIAGNOSIS — Z79.4 TYPE 2 DIABETES MELLITUS WITH HYPERGLYCEMIA, WITH LONG-TERM CURRENT USE OF INSULIN: ICD-10-CM

## 2023-11-20 DIAGNOSIS — F32.9 REACTIVE DEPRESSION: ICD-10-CM

## 2023-11-20 DIAGNOSIS — E78.2 MIXED HYPERLIPIDEMIA: ICD-10-CM

## 2023-11-20 PROCEDURE — 3075F PR MOST RECENT SYSTOLIC BLOOD PRESS GE 130-139MM HG: ICD-10-PCS | Mod: CPTII,,, | Performed by: NURSE PRACTITIONER

## 2023-11-20 PROCEDURE — 99214 OFFICE O/P EST MOD 30 MIN: CPT | Performed by: NURSE PRACTITIONER

## 2023-11-20 PROCEDURE — 3079F DIAST BP 80-89 MM HG: CPT | Mod: CPTII,,, | Performed by: NURSE PRACTITIONER

## 2023-11-20 PROCEDURE — 1160F RVW MEDS BY RX/DR IN RCRD: CPT | Mod: CPTII,,, | Performed by: NURSE PRACTITIONER

## 2023-11-20 PROCEDURE — 1159F PR MEDICATION LIST DOCUMENTED IN MEDICAL RECORD: ICD-10-PCS | Mod: CPTII,,, | Performed by: NURSE PRACTITIONER

## 2023-11-20 PROCEDURE — 3008F BODY MASS INDEX DOCD: CPT | Mod: CPTII,,, | Performed by: NURSE PRACTITIONER

## 2023-11-20 PROCEDURE — 1160F PR REVIEW ALL MEDS BY PRESCRIBER/CLIN PHARMACIST DOCUMENTED: ICD-10-PCS | Mod: CPTII,,, | Performed by: NURSE PRACTITIONER

## 2023-11-20 PROCEDURE — 99214 PR OFFICE/OUTPT VISIT, EST, LEVL IV, 30-39 MIN: ICD-10-PCS | Mod: S$PBB,,, | Performed by: NURSE PRACTITIONER

## 2023-11-20 PROCEDURE — 3079F PR MOST RECENT DIASTOLIC BLOOD PRESSURE 80-89 MM HG: ICD-10-PCS | Mod: CPTII,,, | Performed by: NURSE PRACTITIONER

## 2023-11-20 PROCEDURE — 1159F MED LIST DOCD IN RCRD: CPT | Mod: CPTII,,, | Performed by: NURSE PRACTITIONER

## 2023-11-20 PROCEDURE — 99214 OFFICE O/P EST MOD 30 MIN: CPT | Mod: S$PBB,,, | Performed by: NURSE PRACTITIONER

## 2023-11-20 PROCEDURE — 3046F HEMOGLOBIN A1C LEVEL >9.0%: CPT | Mod: CPTII,,, | Performed by: NURSE PRACTITIONER

## 2023-11-20 PROCEDURE — 3075F SYST BP GE 130 - 139MM HG: CPT | Mod: CPTII,,, | Performed by: NURSE PRACTITIONER

## 2023-11-20 PROCEDURE — 3008F PR BODY MASS INDEX (BMI) DOCUMENTED: ICD-10-PCS | Mod: CPTII,,, | Performed by: NURSE PRACTITIONER

## 2023-11-20 PROCEDURE — 3046F PR MOST RECENT HEMOGLOBIN A1C LEVEL > 9.0%: ICD-10-PCS | Mod: CPTII,,, | Performed by: NURSE PRACTITIONER

## 2023-11-20 RX ORDER — LEVOTHYROXINE SODIUM 150 MCG
150 TABLET ORAL
Qty: 30 TABLET | Refills: 0 | OUTPATIENT
Start: 2023-11-20

## 2023-11-20 RX ORDER — LEVOTHYROXINE SODIUM 175 UG/1
175 TABLET ORAL
Qty: 30 TABLET | Refills: 1 | Status: SHIPPED | OUTPATIENT
Start: 2023-11-20 | End: 2024-01-17

## 2023-11-20 RX ORDER — TIRZEPATIDE 2.5 MG/.5ML
2.5 INJECTION, SOLUTION SUBCUTANEOUS
COMMUNITY
End: 2023-12-12 | Stop reason: SDUPTHER

## 2023-11-20 NOTE — PROGRESS NOTES
SUBJECTIVE:      Patient ID: Marguerite Colon is a 38 y.o. female.    Chief Complaint: Depression    Marguerite is here today for follow-up on depression and recent labs.  Depression is improving on Wellbutrin since last visit- denies any medicine SE or complaints. Denies tachycardia, anxiety or heart racing/elevated BP. Reports decreased use of smoking as well since starting medication. Recent labs reviewed- waiting on endo appt with [facility]. Ozempic not available for DM at pharmacy. Her previous endo Dr. Miller did give her a prescription of Mounjaro which she plans on filling soon.     Overdue health maintenance discussed today, patient refuses recommended flu shot    Depression  Visit Type: follow-up  Patient presents with the following symptoms: depressed mood (improving).  Patient is not experiencing: chest pain, confusion, dizziness, excessive worry, fatigue, feelings of hopelessness, hypersomnia, insomnia, irritability, malaise, nausea, nervousness/anxiety, obsessions, palpitations, panic, shortness of breath, suicidal ideas, suicidal planning, weight gain and weight loss.  Frequency of symptoms: most days   Severity: mild   Sleep quality: fair  Nighttime awakenings: occasional  Compliance with medications:  %      Office Visit on 10/25/2023   Component Date Value Ref Range Status    TSH w/reflex to FT4 11/03/2023 31.86 (H)  mIU/L Final    Comment:           Reference Range                         > or = 20 Years  0.40-4.50                              Pregnancy Ranges            First trimester    0.26-2.66            Second trimester   0.55-2.73            Third trimester    0.43-2.91      T4, Free 11/03/2023 1.0  0.8 - 1.8 ng/dL Final    WBC 11/03/2023 6.4  3.8 - 10.8 Thousand/uL Final    RBC 11/03/2023 4.64  3.80 - 5.10 Million/uL Final    Hemoglobin 11/03/2023 13.7  11.7 - 15.5 g/dL Final    Hematocrit 11/03/2023 42.6  35.0 - 45.0 % Final    MCV 11/03/2023 91.8  80.0 - 100.0 fL Final    MCH 11/03/2023 29.5   27.0 - 33.0 pg Final    MCHC 11/03/2023 32.2  32.0 - 36.0 g/dL Final    RDW 11/03/2023 13.7  11.0 - 15.0 % Final    Platelets 11/03/2023 178  140 - 400 Thousand/uL Final    MPV 11/03/2023 11.3  7.5 - 12.5 fL Final    Neutrophils, Abs 11/03/2023 3,616  1,500 - 7,800 cells/uL Final    Lymph # 11/03/2023 2,118  850 - 3,900 cells/uL Final    Mono # 11/03/2023 352  200 - 950 cells/uL Final    Eos # 11/03/2023 262  15 - 500 cells/uL Final    Baso # 11/03/2023 51  0 - 200 cells/uL Final    Neutrophils Relative 11/03/2023 56.5  % Final    Lymph % 11/03/2023 33.1  % Final    Mono % 11/03/2023 5.5  % Final    Eosinophil % 11/03/2023 4.1  % Final    Basophil % 11/03/2023 0.8  % Final    Glucose 11/03/2023 163 (H)  65 - 99 mg/dL Final    Comment:               Fasting reference interval     For someone without known diabetes, a glucose  value >125 mg/dL indicates that they may have  diabetes and this should be confirmed with a  follow-up test.         BUN 11/03/2023 16  7 - 25 mg/dL Final    Creatinine 11/03/2023 0.75  0.50 - 0.99 mg/dL Final    eGFR 11/03/2023 103  > OR = 60 mL/min/1.73m2 Final    BUN/Creatinine Ratio 11/03/2023 SEE NOTE:  6 - 22 (calc) Final    Comment:    Not Reported: BUN and Creatinine are within     reference range.            Sodium 11/03/2023 139  135 - 146 mmol/L Final    Potassium 11/03/2023 4.4  3.5 - 5.3 mmol/L Final    Chloride 11/03/2023 102  98 - 110 mmol/L Final    CO2 11/03/2023 30  20 - 32 mmol/L Final    Calcium 11/03/2023 9.5  8.6 - 10.2 mg/dL Final    Total Protein 11/03/2023 7.3  6.1 - 8.1 g/dL Final    Albumin 11/03/2023 3.9  3.6 - 5.1 g/dL Final    Globulin, Total 11/03/2023 3.4  1.9 - 3.7 g/dL (calc) Final    Albumin/Globulin Ratio 11/03/2023 1.1  1.0 - 2.5 (calc) Final    Total Bilirubin 11/03/2023 0.6  0.2 - 1.2 mg/dL Final    Alkaline Phosphatase 11/03/2023 102  31 - 125 U/L Final    AST 11/03/2023 19  10 - 30 U/L Final    ALT 11/03/2023 18  6 - 29 U/L Final    Cholesterol  11/03/2023 146  <200 mg/dL Final    HDL 11/03/2023 38 (L)  > OR = 50 mg/dL Final    Triglycerides 11/03/2023 78  <150 mg/dL Final    LDL Cholesterol 11/03/2023 91  mg/dL (calc) Final    Comment: Reference range: <100     Desirable range <100 mg/dL for primary prevention;    <70 mg/dL for patients with CHD or diabetic patients   with > or = 2 CHD risk factors.     LDL-C is now calculated using the VinnyCarpenter   calculation, which is a validated novel method providing   better accuracy than the Friedewald equation in the   estimation of LDL-C.   Jesús ROBERSON et al. AMARJIT. 2013;310(19): 3068-6426   (http://[employer]*.[employer]/faq/DWX930)      HDL/Cholesterol Ratio 11/03/2023 3.8  <5.0 (calc) Final    Non HDL Chol. (LDL+VLDL) 11/03/2023 108  <130 mg/dL (calc) Final    Comment: For patients with diabetes plus 1 major ASCVD risk   factor, treating to a non-HDL-C goal of <100 mg/dL   (LDL-C of <70 mg/dL) is considered a therapeutic   option.      Creatinine, Urine 11/03/2023 229  20 - 275 mg/dL Final    Microalb, Ur 11/03/2023 0.5  See Note: mg/dL Final    Comment: Reference Range:    Reference Range  Not established      Microalb/Creat Ratio 11/03/2023 2  <30 mcg/mg creat Final    Comment:    The ADA defines abnormalities in albumin  excretion as follows:     Albuminuria Category        Result (mcg/mg creatinine)     Normal to Mildly increased   <30  Moderately increased            Severely increased           > OR = 300     The ADA recommends that at least two of three  specimens collected within a 3-6 month period be  abnormal before considering a patient to be  within a diagnostic category.      Hemoglobin A1C 11/03/2023 9.3 (H)  <5.7 % of total Hgb Final    Comment: For someone without known diabetes, a hemoglobin A1c  value of 6.5% or greater indicates that they may have   diabetes and this should be confirmed with a follow-up   test.     For someone with known diabetes, a value <7% indicates   that  "their diabetes is well controlled and a value   greater than or equal to 7% indicates suboptimal   control. A1c targets should be individualized based on   duration of diabetes, age, comorbid conditions, and   other considerations.     Currently, no consensus exists regarding use of  hemoglobin A1c for diagnosis of diabetes for children.         ]    Family History   Problem Relation Age of Onset    Diabetes Mother     Heart disease Mother     Stroke Mother     Hypertension Mother     Diabetes Father     Ovarian cancer Neg Hx     Uterine cancer Neg Hx     Breast cancer Neg Hx     Colon cancer Neg Hx       Social History     Socioeconomic History    Marital status: Other   Tobacco Use    Smoking status: Every Day     Current packs/day: 0.25     Average packs/day: 0.3 packs/day for 20.0 years (5.0 ttl pk-yrs)     Types: Cigarettes    Smokeless tobacco: Never   Substance and Sexual Activity    Alcohol use: No    Drug use: No    Sexual activity: Not Currently     Partners: Male     Social Determinants of Health     Stress: No Stress Concern Present (10/16/2019)    [employer] - Occupational Stress Questionnaire     Feeling of Stress : Not at all     Current Outpatient Medications   Medication Sig Dispense Refill    atorvastatin (LIPITOR) 20 MG tablet Take 1 tablet (20 mg total) by mouth every evening. 90 tablet 1    buPROPion (WELLBUTRIN XL) 150 MG TB24 tablet Take 1 tablet (150 mg total) by mouth once daily. 30 tablet 2    HUMULIN R U-500, CONC, KWIKPEN 500 unit/mL (3 mL) insulin pen Take 60 units SQ before breakfast, take 75 units SQ before lunch and take 80 units SQ before dinner 4 pen 1    metFORMIN (GLUCOPHAGE) 500 MG tablet Take 1 tablet (500 mg total) by mouth 2 (two) times daily. 180 tablet 1    tapinarof (VTAMA) 1 % Crea Use on AA once daily 60 g 1    insulin needles, disposable, 30 x 1/3 " Ndle Uses 4 daily, on multiple daily insulin injections 150 each 12    levothyroxine " (SYNTHROID) 175 MCG tablet Take 1 tablet (175 mcg total) by mouth before breakfast. 30 tablet 1    tirzepatide (MOUNJARO) 2.5 mg/0.5 mL PnIj Inject 2.5 mg into the skin every 7 days.       No current facility-administered medications for this visit.     Review of patient's allergies indicates:   Allergen Reactions    Carboplatin      Had to go to ER.     Tuberculin ppd Rash     localized      Past Medical History:   Diagnosis Date    Anxiety     Arthritis     Back pain     Bilateral headaches 04/01/2016    Cirrhosis     Diabetes mellitus     type 2    Hepatomegaly 08/01/2014    Morbidly obese     Obesity, Class III, BMI 40-49.9 (morbid obesity) 04/01/2016    Ovarian cancer 12/01/2012    grade 1 endometrioid adenoca of the ovary    Pancreatitis     Sleep apnea     Small bowel obstruction due to adhesions 12/01/2015    TB (pulmonary tuberculosis)     Thyroid disease     hypothyroid    Thyroid nodule      Past Surgical History:   Procedure Laterality Date    ABDOMINAL ADHESION SURGERY      done at same time with ovarian cystectomy    CHOLECYSTECTOMY  12/2015    HERNIA REPAIR  12/28/2015    25 x 20 cm Venrtralex ST mesh for repair done at time of [facility] for SBO.     HYSTERECTOMY  02/25/2013    adeline rso. prior lso. ovarian ca    leep      positive margins    left salpingo oophrectomy  07/2010    hemorrhagic cyst    OMENTECTOMY      partial right oophrectomy  12/2012    LMP of ovary    ID REMOVAL OF OVARY/TUBE(S)      THYROIDECTOMY Bilateral 11/16/2022    Procedure: THYROIDECTOMY;  Surgeon: Hugh Toussaint III, MD;  Location: [facility];  Service: General;  Laterality: Bilateral;    x lap  12/28/2015    post open cholecystectomy 2/21/2015.        Review of Systems   Constitutional:  Negative for activity change, chills, fatigue, fever, irritability, unexpected weight change, weight gain and weight loss.   HENT:  Negative for hearing loss, rhinorrhea and trouble swallowing.    Eyes:  Negative for discharge and visual  "disturbance.   Respiratory:  Negative for chest tightness, shortness of breath and wheezing.    Cardiovascular:  Negative for chest pain, palpitations and leg swelling.   Gastrointestinal:  Negative for abdominal pain, blood in stool, constipation, diarrhea, nausea and vomiting.   Endocrine: Negative for polydipsia and polyuria.   Genitourinary:  Negative for difficulty urinating, dysuria, hematuria and menstrual problem.   Musculoskeletal:  Negative for arthralgias and joint swelling.   Neurological:  Negative for dizziness, weakness and headaches.   Hematological:  Negative for adenopathy.   Psychiatric/Behavioral:  Negative for confusion, dysphoric mood and suicidal ideas. The patient is not nervous/anxious and does not have insomnia.       OBJECTIVE:      Vitals:    11/20/23 1107   BP: 132/88   BP Location: Right forearm   Patient Position: Sitting   BP Method: Large (Manual)   Pulse: 62   Resp: 20   Temp: 98.4 °F (36.9 °C)   TempSrc: Oral   SpO2: 97%   Weight: (!) 153.3 kg (337 lb 14.4 oz)   Height: 5' 7" (1.702 m)     Physical Exam  Vitals and nursing note reviewed.   Constitutional:       General: She is not in acute distress.     Appearance: Normal appearance. She is not ill-appearing.      Comments: Morbid obesity   HENT:      Nose: Nose normal.      Mouth/Throat:      Mouth: Mucous membranes are moist.   Eyes:      General: No scleral icterus.     Pupils: Pupils are equal, round, and reactive to light.   Neck:      Comments: Thyroidectomy   Cardiovascular:      Rate and Rhythm: Normal rate and regular rhythm.      Heart sounds: Normal heart sounds.   Pulmonary:      Effort: Pulmonary effort is normal. No respiratory distress.      Breath sounds: Normal breath sounds. No wheezing or rales.   Musculoskeletal:      Cervical back: Normal range of motion and neck supple.   Lymphadenopathy:      Cervical: No cervical adenopathy.   Skin:     General: Skin is warm and dry.      Coloration: Skin is not jaundiced or " pale.   Neurological:      Mental Status: She is alert and oriented to person, place, and time.   Psychiatric:         Mood and Affect: Mood normal.         Behavior: Behavior normal.         Thought Content: Thought content normal.         Judgment: Judgment normal.        Assessment:       1. Acquired hypothyroidism    2. Reactive depression    3. Cigarette smoker    4. Type 2 diabetes mellitus with hyperglycemia, with long-term current use of insulin    5. Mixed hyperlipidemia        Plan:       Acquired hypothyroidism  -     levothyroxine (SYNTHROID) 175 MCG tablet; Take 1 tablet (175 mcg total) by mouth before breakfast.  Dispense: 30 tablet; Refill: 1  -     TSH w/reflex to FT4; Future; Expected date: 01/05/2024  -TSH high, increase to 175 mcg daily and recheck in 6 weeks if have not seen endo yet    Reactive depression   -improving, cont current dose     Cigarette smoker   -cutting down use with Wellbutrin     Type 2 diabetes mellitus with hyperglycemia, with long-term current use of insulin   -A1C 9.3- above goal; start Mounjaro as planned; reminded about possible SE and proper use; f/u with endo if able before next dose/increase or me for dose adjustment     Mixed hyperlipidemia   -controlled on statin; continue daily     I spent a total of 33 minutes on the day of the visit.This includes face to face time and non-face to face time preparing to see the patient (eg, review of tests), obtaining and/or reviewing separately obtained history, documenting clinical information in the electronic or other health record, independently interpreting results and communicating results to the patient/family/caregiver, or care coordinator.       Follow up in about 3 months (around 2/20/2024).      11/20/2023 ELIZABETH Santos, FNP    This note was created using [employer] voice recognition software that occasionally misinterprets phrases or words.

## 2023-11-30 ENCOUNTER — PATIENT MESSAGE (OUTPATIENT)
Dept: FAMILY MEDICINE | Facility: CLINIC | Age: 38
End: 2023-11-30

## 2023-12-04 ENCOUNTER — PATIENT MESSAGE (OUTPATIENT)
Dept: FAMILY MEDICINE | Facility: CLINIC | Age: 38
End: 2023-12-04

## 2023-12-05 ENCOUNTER — PATIENT MESSAGE (OUTPATIENT)
Dept: FAMILY MEDICINE | Facility: CLINIC | Age: 38
End: 2023-12-05

## 2023-12-10 ENCOUNTER — PATIENT MESSAGE (OUTPATIENT)
Dept: FAMILY MEDICINE | Facility: CLINIC | Age: 38
End: 2023-12-10

## 2023-12-10 DIAGNOSIS — E11.65 TYPE 2 DIABETES MELLITUS WITH HYPERGLYCEMIA, WITH LONG-TERM CURRENT USE OF INSULIN: Primary | ICD-10-CM

## 2023-12-10 DIAGNOSIS — Z79.4 TYPE 2 DIABETES MELLITUS WITH HYPERGLYCEMIA, WITH LONG-TERM CURRENT USE OF INSULIN: Primary | ICD-10-CM

## 2023-12-12 RX ORDER — TIRZEPATIDE 2.5 MG/.5ML
2.5 INJECTION, SOLUTION SUBCUTANEOUS
Qty: 4 PEN | Refills: 0 | Status: SHIPPED | OUTPATIENT
Start: 2023-12-12 | End: 2024-01-08 | Stop reason: SDUPTHER

## 2023-12-16 DIAGNOSIS — Z79.4 TYPE 2 DIABETES MELLITUS WITH HYPERGLYCEMIA, WITH LONG-TERM CURRENT USE OF INSULIN: ICD-10-CM

## 2023-12-16 DIAGNOSIS — E11.65 TYPE 2 DIABETES MELLITUS WITH HYPERGLYCEMIA, WITH LONG-TERM CURRENT USE OF INSULIN: ICD-10-CM

## 2023-12-18 RX ORDER — INSULIN HUMAN 500 [IU]/ML
INJECTION, SOLUTION SUBCUTANEOUS
Qty: 12 PEN | Refills: 0 | Status: SHIPPED | OUTPATIENT
Start: 2023-12-18 | End: 2024-03-19 | Stop reason: SDUPTHER

## 2023-12-26 ENCOUNTER — PATIENT MESSAGE (OUTPATIENT)
Dept: FAMILY MEDICINE | Facility: CLINIC | Age: 38
End: 2023-12-26
Payer: MEDICAID

## 2024-01-08 DIAGNOSIS — Z79.4 TYPE 2 DIABETES MELLITUS WITH HYPERGLYCEMIA, WITH LONG-TERM CURRENT USE OF INSULIN: ICD-10-CM

## 2024-01-08 DIAGNOSIS — E11.65 TYPE 2 DIABETES MELLITUS WITH HYPERGLYCEMIA, WITH LONG-TERM CURRENT USE OF INSULIN: ICD-10-CM

## 2024-01-08 RX ORDER — TIRZEPATIDE 2.5 MG/.5ML
2.5 INJECTION, SOLUTION SUBCUTANEOUS
Qty: 4 PEN | Refills: 0 | Status: SHIPPED | OUTPATIENT
Start: 2024-01-08 | End: 2024-01-23

## 2024-01-17 DIAGNOSIS — E03.9 ACQUIRED HYPOTHYROIDISM: ICD-10-CM

## 2024-01-17 DIAGNOSIS — F32.9 REACTIVE DEPRESSION: ICD-10-CM

## 2024-01-17 LAB
LEFT EYE DM RETINOPATHY: NEGATIVE
RIGHT EYE DM RETINOPATHY: NEGATIVE

## 2024-01-17 RX ORDER — LEVOTHYROXINE SODIUM 175 UG/1
175 TABLET ORAL
Qty: 30 TABLET | Refills: 1 | Status: SHIPPED | OUTPATIENT
Start: 2024-01-17 | End: 2024-01-23

## 2024-01-17 RX ORDER — BUPROPION HYDROCHLORIDE 150 MG/1
150 TABLET ORAL
Qty: 30 TABLET | Refills: 1 | Status: SHIPPED | OUTPATIENT
Start: 2024-01-17 | End: 2024-03-11

## 2024-01-23 ENCOUNTER — OFFICE VISIT (OUTPATIENT)
Dept: FAMILY MEDICINE | Facility: CLINIC | Age: 39
End: 2024-01-23
Payer: MEDICAID

## 2024-01-23 VITALS
TEMPERATURE: 98 F | HEART RATE: 64 BPM | SYSTOLIC BLOOD PRESSURE: 130 MMHG | WEIGHT: 293 LBS | DIASTOLIC BLOOD PRESSURE: 86 MMHG | OXYGEN SATURATION: 94 % | HEIGHT: 67 IN | BODY MASS INDEX: 45.99 KG/M2 | RESPIRATION RATE: 20 BRPM

## 2024-01-23 DIAGNOSIS — E03.9 ACQUIRED HYPOTHYROIDISM: ICD-10-CM

## 2024-01-23 DIAGNOSIS — E11.65 TYPE 2 DIABETES MELLITUS WITH HYPERGLYCEMIA, WITH LONG-TERM CURRENT USE OF INSULIN: Primary | ICD-10-CM

## 2024-01-23 DIAGNOSIS — E66.01 MORBID OBESITY WITH BMI OF 50.0-59.9, ADULT: ICD-10-CM

## 2024-01-23 DIAGNOSIS — Z79.4 TYPE 2 DIABETES MELLITUS WITH HYPERGLYCEMIA, WITH LONG-TERM CURRENT USE OF INSULIN: Primary | ICD-10-CM

## 2024-01-23 DIAGNOSIS — F17.210 CIGARETTE SMOKER: ICD-10-CM

## 2024-01-23 PROCEDURE — 99214 OFFICE O/P EST MOD 30 MIN: CPT | Mod: S$PBB,,, | Performed by: NURSE PRACTITIONER

## 2024-01-23 PROCEDURE — 99999 PR PBB SHADOW E&M-EST. PATIENT-LVL IV: CPT | Mod: PBBFAC,,, | Performed by: NURSE PRACTITIONER

## 2024-01-23 PROCEDURE — 1159F MED LIST DOCD IN RCRD: CPT | Mod: CPTII,,, | Performed by: NURSE PRACTITIONER

## 2024-01-23 PROCEDURE — 3008F BODY MASS INDEX DOCD: CPT | Mod: CPTII,,, | Performed by: NURSE PRACTITIONER

## 2024-01-23 PROCEDURE — 1160F RVW MEDS BY RX/DR IN RCRD: CPT | Mod: CPTII,,, | Performed by: NURSE PRACTITIONER

## 2024-01-23 PROCEDURE — 99214 OFFICE O/P EST MOD 30 MIN: CPT | Mod: PBBFAC,PN | Performed by: NURSE PRACTITIONER

## 2024-01-23 PROCEDURE — 3075F SYST BP GE 130 - 139MM HG: CPT | Mod: CPTII,,, | Performed by: NURSE PRACTITIONER

## 2024-01-23 PROCEDURE — 3079F DIAST BP 80-89 MM HG: CPT | Mod: CPTII,,, | Performed by: NURSE PRACTITIONER

## 2024-01-23 RX ORDER — LEVOTHYROXINE SODIUM 200 UG/1
200 TABLET ORAL
COMMUNITY
Start: 2023-12-27 | End: 2024-03-19 | Stop reason: SDUPTHER

## 2024-01-23 RX ORDER — TIRZEPATIDE 5 MG/.5ML
5 INJECTION, SOLUTION SUBCUTANEOUS
Qty: 4 PEN | Refills: 2 | Status: SHIPPED | OUTPATIENT
Start: 2024-01-23 | End: 2024-02-28 | Stop reason: SDUPTHER

## 2024-01-23 NOTE — PROGRESS NOTES
SUBJECTIVE:      Patient ID: Marguerite Colon is a 38 y.o. female.    Chief Complaint: Diabetes    Marguerite is here for follow up on DM today. Taking new medication Mounjaro 2.5 mg weekly, no side effects or complaints. Down 10 lbs since starting medication. BS have significantly improved- logs reviewed. Patient is decreasing her insulin due to improved readings and high protein/low carb diet. Had endo appt at [facility]-notes and labs reviewed.     Diabetes  She has type 2 diabetes mellitus. No MedicAlert identification noted. The initial diagnosis of diabetes was made 11 years ago. Her disease course has been improving. Pertinent negatives for hypoglycemia include no confusion, dizziness, headaches, hunger, mood changes, nervousness/anxiousness, pallor, seizures, sleepiness, speech difficulty, sweats or tremors. Associated symptoms include fatigue. Pertinent negatives for diabetes include no blurred vision, no chest pain, no foot paresthesias, no foot ulcerations, no polydipsia, no polyphagia, no polyuria, no visual change, no weakness and no weight loss. Pertinent negatives for hypoglycemia complications include no blackouts, no hospitalization, no nocturnal hypoglycemia, no required assistance and no required glucagon injection. Symptoms are improving. Pertinent negatives for diabetic complications include no autonomic neuropathy, CVA, heart disease, nephropathy, peripheral neuropathy, PVD or retinopathy. Risk factors for coronary artery disease include dyslipidemia, family history, obesity, tobacco exposure and diabetes mellitus. Current diabetic treatment includes diet, insulin injections and oral agent (monotherapy). She is compliant with treatment most of the time. She is currently taking insulin pre-dinner. Insulin injections are given by patient. Rotation sites for injection include the abdominal wall. Her weight is decreasing steadily. She is following a diabetic diet. Meal planning includes avoidance of  concentrated sweets and calorie counting. She has had a previous visit with a dietitian. She participates in exercise three times a week. She monitors blood glucose at home 3-4 x per day. She monitors urine at home <1 x per month. Blood glucose monitoring compliance is fair. Her home blood glucose trend is decreasing steadily. Her breakfast blood glucose range is generally 110-130 mg/dl. She does not see a podiatrist.Eye exam is current.         Family History   Problem Relation Age of Onset    Diabetes Mother     Heart disease Mother     Stroke Mother     Hypertension Mother     Diabetes Father     Ovarian cancer Neg Hx     Uterine cancer Neg Hx     Breast cancer Neg Hx     Colon cancer Neg Hx       Social History     Socioeconomic History    Marital status: Other   Tobacco Use    Smoking status: Every Day     Current packs/day: 0.25     Average packs/day: 0.3 packs/day for 20.0 years (5.0 ttl pk-yrs)     Types: Cigarettes    Smokeless tobacco: Never   Substance and Sexual Activity    Alcohol use: No    Drug use: No    Sexual activity: Not Currently     Partners: Male     Social Determinants of Health     Financial Resource Strain: Patient Declined (1/21/2024)    Overall Financial Resource Strain (CARDIA)     Difficulty of Paying Living Expenses: Patient declined   Food Insecurity: Patient Declined (1/21/2024)    Hunger Vital Sign     Worried About Running Out of Food in the Last Year: Patient declined     Ran Out of Food in the Last Year: Patient declined   Transportation Needs: No Transportation Needs (1/21/2024)    PRAPARE - Transportation     Lack of Transportation (Medical): No     Lack of Transportation (Non-Medical): No   Physical Activity: Insufficiently Active (1/21/2024)    Exercise Vital Sign     Days of Exercise per Week: 3 days     Minutes of Exercise per Session: 30 min   Stress: Stress Concern Present (1/21/2024)    [place] [employer] - Occupational Stress Questionnaire      "Feeling of Stress : To some extent   Social Connections: Unknown (1/21/2024)    Social Connection and Isolation Panel [NHANES]     Frequency of Communication with Friends and Family: More than three times a week     Frequency of Social Gatherings with Friends and Family: Three times a week     Active Member of Clubs or Organizations: No     Attends Club or Organization Meetings: Patient declined     Marital Status:    Housing Stability: Low Risk  (1/21/2024)    Housing Stability Vital Sign     Unable to Pay for Housing in the Last Year: No     Number of Places Lived in the Last Year: 1     Unstable Housing in the Last Year: No     Current Outpatient Medications   Medication Sig Dispense Refill    atorvastatin (LIPITOR) 20 MG tablet Take 1 tablet (20 mg total) by mouth every evening. 90 tablet 1    buPROPion (WELLBUTRIN XL) 150 MG TB24 tablet TAKE 1 TABLET BY MOUTH EVERY DAY 30 tablet 1    HUMULIN R U-500, CONC, KWIKPEN 500 unit/mL (3 mL) insulin pen TAKE 60 UNITS INTO SKIN BEFORE BREAKFAST, TAKE 75 UNITS BEFORE LUNCH AND TAKE 80 UNITS BEFORE DINNER 12 pen 0    levothyroxine (SYNTHROID) 200 MCG tablet Take 200 mcg by mouth before breakfast.      metFORMIN (GLUCOPHAGE) 500 MG tablet Take 1 tablet (500 mg total) by mouth 2 (two) times daily. 180 tablet 1    tapinarof (VTAMA) 1 % Crea Use on AA once daily 60 g 1    insulin needles, disposable, 30 x 1/3 " Ndle Uses 4 daily, on multiple daily insulin injections 150 each 12    tirzepatide (MOUNJARO) 5 mg/0.5 mL PnIj Inject 5 mg into the skin every 7 days. 4 Pen 2     No current facility-administered medications for this visit.     Review of patient's allergies indicates:   Allergen Reactions    Carboplatin      Had to go to ER.     Tuberculin ppd Rash     localized      Past Medical History:   Diagnosis Date    Anxiety     Arthritis     Back pain     Bilateral headaches 04/01/2016    Cirrhosis     Diabetes mellitus     type 2    Hepatomegaly 08/01/2014    Morbidly " obese     Obesity, Class III, BMI 40-49.9 (morbid obesity) 04/01/2016    Ovarian cancer 12/01/2012    grade 1 endometrioid adenoca of the ovary    Pancreatitis     Sleep apnea     Small bowel obstruction due to adhesions 12/01/2015    TB (pulmonary tuberculosis)     Thyroid disease     hypothyroid    Thyroid nodule      Past Surgical History:   Procedure Laterality Date    ABDOMINAL ADHESION SURGERY      done at same time with ovarian cystectomy    CHOLECYSTECTOMY  12/2015    HERNIA REPAIR  12/28/2015    25 x 20 cm Venrtralex ST mesh for repair done at time of [facility] for SBO.     HYSTERECTOMY  02/25/2013    adeline rso. prior lso. ovarian ca    leep      positive margins    left salpingo oophrectomy  07/2010    hemorrhagic cyst    OMENTECTOMY      partial right oophrectomy  12/2012    LMP of ovary    CO REMOVAL OF OVARY/TUBE(S)      THYROIDECTOMY Bilateral 11/16/2022    Procedure: THYROIDECTOMY;  Surgeon: Hugh Toussaint III, MD;  Location: [facility];  Service: General;  Laterality: Bilateral;    x lap  12/28/2015    post open cholecystectomy 2/21/2015.        Review of Systems   Constitutional:  Positive for fatigue. Negative for activity change, chills, fever, unexpected weight change and weight loss.   HENT:  Negative for hearing loss, rhinorrhea and trouble swallowing.    Eyes:  Negative for blurred vision, discharge and visual disturbance.   Respiratory:  Negative for chest tightness, shortness of breath and wheezing.    Cardiovascular:  Negative for chest pain, palpitations and leg swelling.   Gastrointestinal:  Negative for abdominal pain, blood in stool, constipation, diarrhea, nausea and vomiting.   Endocrine: Negative for polydipsia, polyphagia and polyuria.   Genitourinary:  Negative for difficulty urinating, dysuria, hematuria and menstrual problem.   Musculoskeletal:  Negative for arthralgias and joint swelling.   Skin:  Negative for pallor.   Neurological:  Negative for dizziness, tremors, seizures, speech  "difficulty, weakness and headaches.   Hematological:  Negative for adenopathy. Does not bruise/bleed easily.   Psychiatric/Behavioral:  Negative for confusion, dysphoric mood and suicidal ideas. The patient is not nervous/anxious.       OBJECTIVE:      Vitals:    01/23/24 0759   BP: 130/86   BP Location: Right arm   Patient Position: Sitting   BP Method: Large (Manual)   Pulse: 64   Resp: 20   Temp: 98.3 °F (36.8 °C)   TempSrc: Oral   SpO2: (!) 94%   Weight: (!) 148.1 kg (326 lb 9.6 oz)   Height: 5' 7" (1.702 m)     Physical Exam  Vitals and nursing note reviewed.   Constitutional:       General: She is not in acute distress.     Appearance: Normal appearance. She is not ill-appearing.      Comments: Morbid obesity   HENT:      Mouth/Throat:      Mouth: Mucous membranes are moist.   Eyes:      General: No scleral icterus.     Pupils: Pupils are equal, round, and reactive to light.   Neck:      Comments: Thyroidectomy   Cardiovascular:      Rate and Rhythm: Normal rate and regular rhythm.      Heart sounds: Normal heart sounds.   Pulmonary:      Effort: Pulmonary effort is normal. No respiratory distress.      Breath sounds: Normal breath sounds. No wheezing or rales.   Musculoskeletal:      Cervical back: Normal range of motion and neck supple.   Lymphadenopathy:      Cervical: No cervical adenopathy.   Skin:     General: Skin is warm and dry.      Coloration: Skin is not jaundiced or pale.   Neurological:      Mental Status: She is alert and oriented to person, place, and time.   Psychiatric:         Mood and Affect: Mood normal.         Behavior: Behavior normal.         Thought Content: Thought content normal.         Judgment: Judgment normal.        Assessment:       1. Type 2 diabetes mellitus with hyperglycemia, with long-term current use of insulin    2. Acquired hypothyroidism    3. Cigarette smoker    4. Morbid obesity with BMI of 50.0-59.9, adult        Plan:       Type 2 diabetes mellitus with " hyperglycemia, with long-term current use of insulin  -     tirzepatide (MOUNJARO) 5 mg/0.5 mL PnIj; Inject 5 mg into the skin every 7 days.  Dispense: 4 Pen; Refill: 2  -     CBC Auto Differential; Future; Expected date: 01/23/2024  -     Comprehensive Metabolic Panel; Future; Expected date: 01/23/2024  -     Hemoglobin A1C; Future; Expected date: 01/23/2024  -may increase to Mounjaro 5 mg weekly; cont checking BS and reduce insulin; get copies of eye exam from 20/20    Acquired hypothyroidism  -     TSH; Future; Expected date: 01/23/2024  -     T4, FREE; Future; Expected date: 01/23/2024  -recheck in 2 mo with wt loss; has endo appt in 4/24    Cigarette smoker   -down to 2 cigs daily; cont wellbutrin     Morbid obesity with BMI of 50.0-59.9, adult        Follow up in about 2 months (around 3/23/2024) for f/u DM .      1/23/2024 ELIZABETH Santos, FNP    This note was created using [employer] voice recognition software that occasionally misinterprets phrases or words.

## 2024-01-26 ENCOUNTER — PATIENT OUTREACH (OUTPATIENT)
Dept: ADMINISTRATIVE | Facility: HOSPITAL | Age: 39
End: 2024-01-26
Payer: MEDICAID

## 2024-01-26 ENCOUNTER — PATIENT MESSAGE (OUTPATIENT)
Dept: ADMINISTRATIVE | Facility: HOSPITAL | Age: 39
End: 2024-01-26
Payer: MEDICAID

## 2024-01-26 NOTE — PROGRESS NOTES
Population Health Chart Review & Patient Outreach Details    Outreach Performed: YES Portal    Additional [facility] Health Notes:           Updates Requested / Reviewed:      Updated Care Coordination Note, Care Everywhere, , External Sources: DIS, Care Team Updated, and Immunizations Reconciliation Completed or Queried: [place]         Health Maintenance Topics Overdue:    Health Maintenance Due   Topic Date Due    Pneumococcal Vaccines (Age 0-64) (1 of 2 - PCV) Never done    TETANUS VACCINE  Never done    Mammogram  Never done    Eye Exam  01/23/2021    Foot Exam  10/20/2022    COVID-19 Vaccine (3 - 2023-24 season) 09/01/2023    Hemoglobin A1c  02/03/2024         Health Maintenance Topic(s) Outreach Outcomes & Actions Taken:    Breast Cancer Screening - Outreach Outcomes & Actions Taken  : Pt Will Schedule with External Provider / Order Routed & Care Team Updated if Applicable    Eye Exam - Outreach Outcomes & Actions Taken  : External Records Requested & Care Team Updated if Applicable    Lab(s) - Outreach Outcomes & Actions Taken  : Patient Will Schedule with External Provider / Order Routed, & Care Team Updated if Applicable

## 2024-01-26 NOTE — LETTER
"       AUTHORIZATION FOR RELEASE OF   CONFIDENTIAL INFORMATION    [facility] ,    We are seeing Marguerite Colon, date of birth 1985, in the clinic at [facility] [facility]. Eleni Gooden FNP is the patient's PCP. Marguerite Colon has an outstanding lab/procedure at the time we reviewed her chart. In order to help keep her health information updated, she has authorized us to request the following medical record(s):        ( x )  EYE EXAM              Please fax records to [facility], Berel, Kimberly C., FNP, [phone]     If you have any questions, please contact       Shira Cates  Nurse Clinical Care Coordinator  [facility]  Phone: [phone]  Fax: [phone]      Patient Name: Marguerite Colon  : 1985  Patient Phone #: [phone]              Marguerite Colon  MRN: [id number]  : 1985  Age: 38 y.o.  Sex: female       Registration Clinic Authorization     Patient / Guardian Signature:       A. Consent for Examination and Treatment: I hereby authorize the providers and employees of [facility] access hospital and [facility] ("[facility]") to provide medical treatment/services which includes, but is not limited to, performing and administering tests and diagnostic procedures that are deemed necessary, including, but not limited to, imaging examinations, blood tests and other laboratory procedures as may be required by the hospital, clinic, or may be ordered by my physician(s) or persons working under the general and/or special instructions of my physician(s).      1. I understand and agree that this consent covers all authorized persons, including but not limited to physicians, residents, nurse practitioners, physicians' assistants, specialists, consultants, student nurses, and independently contracted physicians, who are called upon by the physician in charge, to carry out the diagnostic procedures and medical or " surgical treatment.   2. I hereby authorize [facility] to retain or dispose of any specimens or tissue, should there be such remaining from any test or procedure.   3. I hereby authorize and give consent for [facility] providers and employees to take photographs, images or videotapes of such diagnostic, surgical or treatment procedures of Patient as may be required by [facility] or as may be ordered by a physician. I further acknowledge and agree that [facility] may use cameras or other devices for patient monitoring.   4. I am aware that the practice of medicine is not an exact science, and I acknowledge that no guarantees have been made to me as to the outcome of any tests, procedures or treatment.         B. Authorization for Release of Information: I understand that my insurance company and/or their agents may need information necessary to make determinations about payment/reimbursement. I hereby provide authorization to release to all insurance companies, their successors, assignees, other parties with whom they may have contracted, or others acting on their behalf, that are involved with payment for any hospital and/or clinic charges incurred by the patient, any information that they request and deem necessary for payment/reimbursement, and/or quality review.   I further authorize the release of my health information to physicians or other health care practitioners on staff who are involved in my health care now and in the future, and to other health care providers, entities, or institutions for the purpose of my continued care and treatment, including referrals.         Clinic Authorization [facility]-  Form 29002 - pg. 1 of 3                    C. Medicare Patient's Certification and Authorization to Release Information and Payment Request: I certify that the information given by me in applying for payment under Title XVIII of the Social Security Act is correct. I authorize any levy of  medical or other information about me to release to the Social Security Administration, or its intermediaries or carriers, any information needed for this or a related Medicare claim. I request that payment of authorized benefits be made on my behalf.       D. Assignment of Insurance Benefits: I hereby authorize any and all insurance companies, health plans, defined benefit plans, health insurers or any entity that is or may be responsible for payment of my medical expenses to pay all hospital and medical benefits now due, and to become due and payable to me under any hospital benefits, sick benefits, injury benefits or any other benefit for services rendered to me, including Major Medical Benefits, direct to [facility] and all independently contracted physicians. I assign any and all rights that I may have against any and all insurance companies, health plans, defined benefit plans, health insurers or any entity that is or may be responsible for payment of my medical expenses, including, but not limited to any right to appeal a denial of a claim, any right to bring any action, lawsuit, administrative proceeding, or other cause of action on my behalf. I specifically assign my right to pursue litigation against any and all insurance companies, health plans, defined benefit plans, health insurers or any entity that is or may be responsible for payment of my medical expenses based upon a refusal to pay charges.       E. Valuables: It is understood and agreed that [facility] is not liable for the damage to or loss of any money, jewelry, documents, dentures, eye glasses, hearing aids, prosthetics, or other property of value.      F. Computer Equipment: I understand and agree that should I choose to use computer equipment owned by [facility] or if I choose to access the Internet via [employer] network, I do so at my own risk. [facility] is not responsible for any damage to my computer  equipment or to any damages of any type that might arise from my loss of equipment or data.      G. Acceptance of Financial Responsibility: I agree that in consideration of the services and supplies that have been or will be furnished to the patient, I am hereby obligated to pay all charges made for or on the account of the patient according to the standard rates (in effect at the time the services and supplies are delivered) established by [facility], including its Patient Financial Assistance Policy to the extent it is applicable. I understand that I am responsible for all charges, or portions thereof, not covered by insurance or other sources. Patient refunds will be distributed only after balances at all [facility] facilities are paid.      H. Communication Authorization: I hereby authorize [facility] and its representatives, along with any billing service or  who may work on their behalf, to contact me on my cell phone and/or home phone using pre-recorded messages, artificial voice messages, automatic telephone dialing devices or other computer assisted technology, or by electronic mail, text messaging, or by any other form of electronic communication. This includes, but is not limited to, appointment reminders, yearly physical exam reminders, preventive care reminders, patient campaigns, welcome calls, and calls about account balances on my account or any account on which I am listed as a guarantor. I understand I have the right to opt out of these communications at any time.            Clinic Authorization [facility]-  Form 60945 - pg. 2 of 3              I. Relationship Between Facility and Physician: I understand that some, but not all, providers furnishing services to the patient are not employees or agents of [facility]. The patient is under the care and supervision of his/her attending physician, and it is the responsibility of the facility and its nursing staff to carry  out the instructions of such physicians. It is the responsibility of the patient's physician/designee to obtain the patient's informed consent, when required, for medical or surgical treatment, special diagnostic or therapeutic procedures, or hospital services rendered for the patient under the special instructions of the physician/designee.      J. Notice of Privacy Practices: I acknowledge I have received a copy of [facility]'s Notice of Privacy Practices.      K. Facility Directory: I have discussed with the organization my desire to be either included or excluded in the facility directory. I understand that if my choice is to opt-out of being identified in the facility directory that the facility will not provide any information about me such as my condition (e.g., fair, stable, etc.) or my location in the facility (e.g., room number, department).      L. Immunizations: [facility] shares immunization information with state sponsored health departments to help you and your doctor keep track of your immunization records. By signing, you consent to have this information shared with the health department in your state:      [place] - LINKS ([place] [employer])      M. [facility]: As used in this document, [facility] means all [facility] owned and managed facilities, including, but not limited to, all health centers, surgery centers, clinics, urgent care centers, and hospitals.      N. TERM: This authorization is valid for this and subsequent care/treatment I receive at [facility] and will remain valid unless/ until revoked in writing by me.         [facility] complies with applicable Federal civil rights laws and does not discriminate on the basis of race, color, national origin, age, disability, sex, gender identity or expression.      [facility] cumple con las leyes federales de derechos civiles aplicables y no discrimina por motivos  de kenny, color, nacionalidad, edad, discapacidad, sexo, identidad o expresión de género. Ember choudhury [phone]      [facility] tuân th? gonzalo?t nhân darshan?n hi?n hành c?a Liên bang và không phân bi?t ??i x? d?a trên ch?ng t?c, màu da, lisa?n g?c qu?c kwadwo, tu?i tác khuy?t t?t, gi?i tính, nh?n d?ng gi?i t??nh ho?c bi?u hi?n. G?i s? [phone].            Clinic Authorization [facility]-  Form 27966 - pg. 3 of 3

## 2024-02-13 LAB
ALBUMIN SERPL-MCNC: 3.9 G/DL (ref 3.6–5.1)
ALBUMIN/GLOB SERPL: 1.2 (CALC) (ref 1–2.5)
ALP SERPL-CCNC: 77 U/L (ref 31–125)
ALT SERPL-CCNC: 12 U/L (ref 6–29)
AST SERPL-CCNC: 11 U/L (ref 10–30)
BASOPHILS # BLD AUTO: 27 CELLS/UL (ref 0–200)
BASOPHILS NFR BLD AUTO: 0.4 %
BILIRUB SERPL-MCNC: 0.4 MG/DL (ref 0.2–1.2)
BUN SERPL-MCNC: 18 MG/DL (ref 7–25)
BUN/CREAT SERPL: NORMAL (CALC) (ref 6–22)
CALCIUM SERPL-MCNC: 9.1 MG/DL (ref 8.6–10.2)
CHLORIDE SERPL-SCNC: 104 MMOL/L (ref 98–110)
CO2 SERPL-SCNC: 29 MMOL/L (ref 20–32)
CREAT SERPL-MCNC: 0.62 MG/DL (ref 0.5–0.99)
EGFR: 115 ML/MIN/1.73M2
EOSINOPHIL # BLD AUTO: 161 CELLS/UL (ref 15–500)
EOSINOPHIL NFR BLD AUTO: 2.4 %
ERYTHROCYTE [DISTWIDTH] IN BLOOD BY AUTOMATED COUNT: 13.2 % (ref 11–15)
GLOBULIN SER CALC-MCNC: 3.2 G/DL (CALC) (ref 1.9–3.7)
GLUCOSE SERPL-MCNC: 83 MG/DL (ref 65–99)
HBA1C MFR BLD: 7.6 % OF TOTAL HGB
HCT VFR BLD AUTO: 41 % (ref 35–45)
HGB BLD-MCNC: 13 G/DL (ref 11.7–15.5)
LYMPHOCYTES # BLD AUTO: 2111 CELLS/UL (ref 850–3900)
LYMPHOCYTES NFR BLD AUTO: 31.5 %
MCH RBC QN AUTO: 27.7 PG (ref 27–33)
MCHC RBC AUTO-ENTMCNC: 31.7 G/DL (ref 32–36)
MCV RBC AUTO: 87.2 FL (ref 80–100)
MONOCYTES # BLD AUTO: 529 CELLS/UL (ref 200–950)
MONOCYTES NFR BLD AUTO: 7.9 %
NEUTROPHILS # BLD AUTO: 3873 CELLS/UL (ref 1500–7800)
NEUTROPHILS NFR BLD AUTO: 57.8 %
PLATELET # BLD AUTO: 185 THOUSAND/UL (ref 140–400)
PMV BLD REES-ECKER: 11.8 FL (ref 7.5–12.5)
POTASSIUM SERPL-SCNC: 4.2 MMOL/L (ref 3.5–5.3)
PROT SERPL-MCNC: 7.1 G/DL (ref 6.1–8.1)
RBC # BLD AUTO: 4.7 MILLION/UL (ref 3.8–5.1)
SODIUM SERPL-SCNC: 142 MMOL/L (ref 135–146)
T4 FREE SERPL-MCNC: 1.3 NG/DL (ref 0.8–1.8)
TSH SERPL-ACNC: 1.73 MIU/L
WBC # BLD AUTO: 6.7 THOUSAND/UL (ref 3.8–10.8)

## 2024-02-15 DIAGNOSIS — Z79.4 TYPE 2 DIABETES MELLITUS WITH HYPERGLYCEMIA, WITH LONG-TERM CURRENT USE OF INSULIN: ICD-10-CM

## 2024-02-15 DIAGNOSIS — E11.65 TYPE 2 DIABETES MELLITUS WITH HYPERGLYCEMIA, WITH LONG-TERM CURRENT USE OF INSULIN: ICD-10-CM

## 2024-02-15 DIAGNOSIS — E78.2 MIXED HYPERLIPIDEMIA: ICD-10-CM

## 2024-02-15 RX ORDER — METFORMIN HYDROCHLORIDE 500 MG/1
500 TABLET ORAL 2 TIMES DAILY
Qty: 60 TABLET | Refills: 5 | Status: SHIPPED | OUTPATIENT
Start: 2024-02-15 | End: 2024-03-19 | Stop reason: SDUPTHER

## 2024-02-15 RX ORDER — ATORVASTATIN CALCIUM 20 MG/1
20 TABLET, FILM COATED ORAL NIGHTLY
Qty: 30 TABLET | Refills: 5 | Status: SHIPPED | OUTPATIENT
Start: 2024-02-15 | End: 2024-03-19 | Stop reason: SDUPTHER

## 2024-02-20 ENCOUNTER — TELEPHONE (OUTPATIENT)
Dept: FAMILY MEDICINE | Facility: CLINIC | Age: 39
End: 2024-02-20
Payer: MEDICAID

## 2024-02-20 NOTE — TELEPHONE ENCOUNTER
Called patient. No answer. Message left for return call to [phone] option #3.        No anemia, platelets are normal range, A1c is down to 7.6!  Goal is below 7.0, continue current medication and diet/weight loss efforts, fasting glucose in normal range, kidney and liver function tests normal as well as electrolytes, thyroid function is in normal range, continue current dose of medication until endocrinology appointment

## 2024-02-28 DIAGNOSIS — E11.65 TYPE 2 DIABETES MELLITUS WITH HYPERGLYCEMIA, WITH LONG-TERM CURRENT USE OF INSULIN: ICD-10-CM

## 2024-02-28 DIAGNOSIS — Z79.4 TYPE 2 DIABETES MELLITUS WITH HYPERGLYCEMIA, WITH LONG-TERM CURRENT USE OF INSULIN: ICD-10-CM

## 2024-02-28 RX ORDER — TIRZEPATIDE 5 MG/.5ML
5 INJECTION, SOLUTION SUBCUTANEOUS
Qty: 4 PEN | Refills: 2 | Status: SHIPPED | OUTPATIENT
Start: 2024-02-28 | End: 2024-03-19 | Stop reason: SDUPTHER

## 2024-03-10 DIAGNOSIS — F32.9 REACTIVE DEPRESSION: ICD-10-CM

## 2024-03-11 RX ORDER — BUPROPION HYDROCHLORIDE 150 MG/1
150 TABLET ORAL
Qty: 30 TABLET | Refills: 1 | Status: SHIPPED | OUTPATIENT
Start: 2024-03-11 | End: 2024-03-19 | Stop reason: SDUPTHER

## 2024-03-19 ENCOUNTER — PATIENT MESSAGE (OUTPATIENT)
Dept: FAMILY MEDICINE | Facility: CLINIC | Age: 39
End: 2024-03-19

## 2024-03-19 ENCOUNTER — OFFICE VISIT (OUTPATIENT)
Dept: FAMILY MEDICINE | Facility: CLINIC | Age: 39
End: 2024-03-19
Payer: MEDICAID

## 2024-03-19 VITALS
HEIGHT: 67 IN | TEMPERATURE: 99 F | BODY MASS INDEX: 45.99 KG/M2 | OXYGEN SATURATION: 95 % | SYSTOLIC BLOOD PRESSURE: 104 MMHG | HEART RATE: 65 BPM | WEIGHT: 293 LBS | DIASTOLIC BLOOD PRESSURE: 78 MMHG

## 2024-03-19 DIAGNOSIS — E78.2 MIXED HYPERLIPIDEMIA: ICD-10-CM

## 2024-03-19 DIAGNOSIS — Z79.4 TYPE 2 DIABETES MELLITUS WITH HYPERGLYCEMIA, WITH LONG-TERM CURRENT USE OF INSULIN: Primary | ICD-10-CM

## 2024-03-19 DIAGNOSIS — E11.65 TYPE 2 DIABETES MELLITUS WITH HYPERGLYCEMIA, WITH LONG-TERM CURRENT USE OF INSULIN: Primary | ICD-10-CM

## 2024-03-19 DIAGNOSIS — E03.9 ACQUIRED HYPOTHYROIDISM: ICD-10-CM

## 2024-03-19 DIAGNOSIS — F32.9 REACTIVE DEPRESSION: ICD-10-CM

## 2024-03-19 PROCEDURE — 3074F SYST BP LT 130 MM HG: CPT | Mod: CPTII,,, | Performed by: NURSE PRACTITIONER

## 2024-03-19 PROCEDURE — 3078F DIAST BP <80 MM HG: CPT | Mod: CPTII,,, | Performed by: NURSE PRACTITIONER

## 2024-03-19 PROCEDURE — 1160F RVW MEDS BY RX/DR IN RCRD: CPT | Mod: CPTII,,, | Performed by: NURSE PRACTITIONER

## 2024-03-19 PROCEDURE — 3051F HG A1C>EQUAL 7.0%<8.0%: CPT | Mod: CPTII,,, | Performed by: NURSE PRACTITIONER

## 2024-03-19 PROCEDURE — 99999 PR PBB SHADOW E&M-EST. PATIENT-LVL III: CPT | Mod: PBBFAC,,, | Performed by: NURSE PRACTITIONER

## 2024-03-19 PROCEDURE — 99213 OFFICE O/P EST LOW 20 MIN: CPT | Mod: PBBFAC,PN | Performed by: NURSE PRACTITIONER

## 2024-03-19 PROCEDURE — 99214 OFFICE O/P EST MOD 30 MIN: CPT | Mod: S$PBB,,, | Performed by: NURSE PRACTITIONER

## 2024-03-19 PROCEDURE — 1159F MED LIST DOCD IN RCRD: CPT | Mod: CPTII,,, | Performed by: NURSE PRACTITIONER

## 2024-03-19 PROCEDURE — 3008F BODY MASS INDEX DOCD: CPT | Mod: CPTII,,, | Performed by: NURSE PRACTITIONER

## 2024-03-19 RX ORDER — INSULIN HUMAN 500 [IU]/ML
INJECTION, SOLUTION SUBCUTANEOUS
Qty: 12 PEN | Refills: 0 | Status: SHIPPED | OUTPATIENT
Start: 2024-03-19

## 2024-03-19 RX ORDER — ATORVASTATIN CALCIUM 20 MG/1
20 TABLET, FILM COATED ORAL NIGHTLY
Qty: 90 TABLET | Refills: 1 | Status: SHIPPED | OUTPATIENT
Start: 2024-03-19

## 2024-03-19 RX ORDER — METFORMIN HYDROCHLORIDE 500 MG/1
500 TABLET ORAL 2 TIMES DAILY
Qty: 180 TABLET | Refills: 1 | Status: SHIPPED | OUTPATIENT
Start: 2024-03-19

## 2024-03-19 RX ORDER — BUPROPION HYDROCHLORIDE 150 MG/1
150 TABLET ORAL DAILY
Qty: 90 TABLET | Refills: 1 | Status: SHIPPED | OUTPATIENT
Start: 2024-03-19

## 2024-03-19 RX ORDER — LEVOTHYROXINE SODIUM 200 UG/1
200 TABLET ORAL
Qty: 90 TABLET | Refills: 0 | Status: SHIPPED | OUTPATIENT
Start: 2024-03-19

## 2024-03-19 RX ORDER — TIRZEPATIDE 5 MG/.5ML
5 INJECTION, SOLUTION SUBCUTANEOUS
Qty: 12 PEN | Refills: 0 | Status: SHIPPED | OUTPATIENT
Start: 2024-03-19 | End: 2024-06-12 | Stop reason: SDUPTHER

## 2024-03-19 NOTE — PROGRESS NOTES
SUBJECTIVE:      Patient ID: Marguerite Colon is a 39 y.o. female.    Chief Complaint: Diabetes    Marguerite is here for follow up on DM today. Taking  Mounjaro 5 mg weekly, no side effects or complaints. Down 40 lbs total since starting medication. BS have significantly improved- forgot log in car but can send to me. Some days she is not using her insulin at all- decreasing her insulin due to improved readings and high protein/low carb diet. Has endo appt at [facility] in 7/24. Thyroid well-controlled on current dose of medicine. Still trying to get scheduled for bariatric surgery. Has eye exam next month. Due for foot exam today.     Diabetes  She presents for her follow-up diabetic visit. She has type 2 diabetes mellitus. No MedicAlert identification noted. The initial diagnosis of diabetes was made 11 years ago. Her disease course has been improving. Pertinent negatives for hypoglycemia include no confusion, dizziness, headaches, hunger, mood changes, nervousness/anxiousness, pallor, seizures, sleepiness, speech difficulty, sweats or tremors. Associated symptoms include weight loss. Pertinent negatives for diabetes include no blurred vision, no chest pain, no fatigue, no foot paresthesias, no foot ulcerations, no polydipsia, no polyphagia, no polyuria, no visual change and no weakness. Pertinent negatives for hypoglycemia complications include no blackouts, no hospitalization, no nocturnal hypoglycemia, no required assistance and no required glucagon injection. Symptoms are resolved. Pertinent negatives for diabetic complications include no autonomic neuropathy, CVA, heart disease, nephropathy, peripheral neuropathy, PVD or retinopathy. Risk factors for coronary artery disease include dyslipidemia, family history, obesity, post-menopausal, stress, tobacco exposure and diabetes mellitus. Current diabetic treatment includes insulin injections and oral agent (dual therapy). She is compliant with treatment most of the time. She  is currently taking insulin pre-breakfast, pre-lunch and pre-dinner. Insulin injections are given by patient. Rotation sites for injection include the abdominal wall. Her weight is decreasing steadily. She is following a diabetic diet. Meal planning includes avoidance of concentrated sweets, calorie counting and carbohydrate counting. She has had a previous visit with a dietitian. She participates in exercise every other day. She monitors blood glucose at home 3-4 x per day. She monitors urine at home <1 x per month. Blood glucose monitoring compliance is fair. Her home blood glucose trend is decreasing steadily. Her breakfast blood glucose range is generally  mg/dl. She sees a podiatrist.Eye exam is not current.       No visits with results within 1 Month(s) from this visit.   Latest known visit with results is:   Office Visit on 01/23/2024   Component Date Value Ref Range Status    WBC 02/12/2024 6.7  3.8 - 10.8 Thousand/uL Final    RBC 02/12/2024 4.70  3.80 - 5.10 Million/uL Final    Hemoglobin 02/12/2024 13.0  11.7 - 15.5 g/dL Final    Hematocrit 02/12/2024 41.0  35.0 - 45.0 % Final    MCV 02/12/2024 87.2  80.0 - 100.0 fL Final    MCH 02/12/2024 27.7  27.0 - 33.0 pg Final    MCHC 02/12/2024 31.7 (L)  32.0 - 36.0 g/dL Final    RDW 02/12/2024 13.2  11.0 - 15.0 % Final    Platelets 02/12/2024 185  140 - 400 Thousand/uL Final    MPV 02/12/2024 11.8  7.5 - 12.5 fL Final    Neutrophils, Abs 02/12/2024 3,873  1,500 - 7,800 cells/uL Final    Lymph # 02/12/2024 2,111  850 - 3,900 cells/uL Final    Mono # 02/12/2024 529  200 - 950 cells/uL Final    Eos # 02/12/2024 161  15 - 500 cells/uL Final    Baso # 02/12/2024 27  0 - 200 cells/uL Final    Neutrophils Relative 02/12/2024 57.8  % Final    Lymph % 02/12/2024 31.5  % Final    Mono % 02/12/2024 7.9  % Final    Eosinophil % 02/12/2024 2.4  % Final    Basophil % 02/12/2024 0.4  % Final    Glucose 02/12/2024 83  65 - 99 mg/dL Final    Comment:               Fasting  reference interval         BUN 02/12/2024 18  7 - 25 mg/dL Final    Creatinine 02/12/2024 0.62  0.50 - 0.99 mg/dL Final    eGFR 02/12/2024 115  > OR = 60 mL/min/1.73m2 Final    BUN/Creatinine Ratio 02/12/2024 SEE NOTE:  6 - 22 (calc) Final    Comment:    Not Reported: BUN and Creatinine are within     reference range.            Sodium 02/12/2024 142  135 - 146 mmol/L Final    Potassium 02/12/2024 4.2  3.5 - 5.3 mmol/L Final    Chloride 02/12/2024 104  98 - 110 mmol/L Final    CO2 02/12/2024 29  20 - 32 mmol/L Final    Calcium 02/12/2024 9.1  8.6 - 10.2 mg/dL Final    Total Protein 02/12/2024 7.1  6.1 - 8.1 g/dL Final    Albumin 02/12/2024 3.9  3.6 - 5.1 g/dL Final    Globulin, Total 02/12/2024 3.2  1.9 - 3.7 g/dL (calc) Final    Albumin/Globulin Ratio 02/12/2024 1.2  1.0 - 2.5 (calc) Final    Total Bilirubin 02/12/2024 0.4  0.2 - 1.2 mg/dL Final    Alkaline Phosphatase 02/12/2024 77  31 - 125 U/L Final    AST 02/12/2024 11  10 - 30 U/L Final    ALT 02/12/2024 12  6 - 29 U/L Final    Hemoglobin A1C 02/12/2024 7.6 (H)  <5.7 % of total Hgb Final    Comment: For someone without known diabetes, a hemoglobin A1c  value of 6.5% or greater indicates that they may have   diabetes and this should be confirmed with a follow-up   test.     For someone with known diabetes, a value <7% indicates   that their diabetes is well controlled and a value   greater than or equal to 7% indicates suboptimal   control. A1c targets should be individualized based on   duration of diabetes, age, comorbid conditions, and   other considerations.     Currently, no consensus exists regarding use of  hemoglobin A1c for diagnosis of diabetes for children.      HbA1c performed on Roche platform.  Effective 11/13/23 a change in test platforms may have   shifted HbA1c results compared to historical results.          TSH 02/12/2024 1.73  mIU/L Final    Comment:           Reference Range                         > or = 20 Years  0.40-4.50                               Pregnancy Ranges            First trimester    0.26-2.66            Second trimester   0.55-2.73            Third trimester    0.43-2.91      T4, Free 02/12/2024 1.3  0.8 - 1.8 ng/dL Final       Family History   Problem Relation Age of Onset    Diabetes Mother     Heart disease Mother     Stroke Mother     Hypertension Mother     Diabetes Father     Ovarian cancer Neg Hx     Uterine cancer Neg Hx     Breast cancer Neg Hx     Colon cancer Neg Hx       Social History     Socioeconomic History    Marital status: Other   Tobacco Use    Smoking status: Every Day     Current packs/day: 0.25     Average packs/day: 0.3 packs/day for 20.0 years (5.0 ttl pk-yrs)     Types: Cigarettes    Smokeless tobacco: Never   Substance and Sexual Activity    Alcohol use: No    Drug use: No    Sexual activity: Not Currently     Partners: Male     Social Determinants of Health     Financial Resource Strain: Patient Declined (1/21/2024)    Overall Financial Resource Strain (CARDIA)     Difficulty of Paying Living Expenses: Patient declined   Food Insecurity: Patient Declined (1/21/2024)    Hunger Vital Sign     Worried About Running Out of Food in the Last Year: Patient declined     Ran Out of Food in the Last Year: Patient declined   Transportation Needs: No Transportation Needs (1/21/2024)    PRAPARE - Transportation     Lack of Transportation (Medical): No     Lack of Transportation (Non-Medical): No   Physical Activity: Insufficiently Active (1/21/2024)    Exercise Vital Sign     Days of Exercise per Week: 3 days     Minutes of Exercise per Session: 30 min   Stress: Stress Concern Present (1/21/2024)    [employer] [place] [employer] - Occupational Stress Questionnaire     Feeling of Stress : To some extent   Social Connections: Unknown (1/21/2024)    Social Connection and Isolation Panel [NHANES]     Frequency of Communication with Friends and Family: More than three times a week     Frequency of Social Gatherings  "with Friends and Family: Three times a week     Active Member of Clubs or Organizations: No     Attends Club or Organization Meetings: Patient declined     Marital Status:    Housing Stability: Low Risk  (1/21/2024)    Housing Stability Vital Sign     Unable to Pay for Housing in the Last Year: No     Number of Places Lived in the Last Year: 1     Unstable Housing in the Last Year: No     Current Outpatient Medications   Medication Sig Dispense Refill    insulin needles, disposable, 30 x 1/3 " Ndle Uses 4 daily, on multiple daily insulin injections 150 each 12    tapinarof (VTAMA) 1 % Crea Use on AA once daily 60 g 1    atorvastatin (LIPITOR) 20 MG tablet Take 1 tablet (20 mg total) by mouth every evening. 90 tablet 1    buPROPion (WELLBUTRIN XL) 150 MG TB24 tablet Take 1 tablet (150 mg total) by mouth once daily. 90 tablet 1    HUMULIN R U-500, CONC, KWIKPEN 500 unit/mL (3 mL) insulin pen TAKE 60 UNITS INTO SKIN BEFORE BREAKFAST, TAKE 75 UNITS BEFORE LUNCH AND TAKE 80 UNITS BEFORE DINNER 12 pen 0    levothyroxine (SYNTHROID) 200 MCG tablet Take 1 tablet (200 mcg total) by mouth before breakfast. 90 tablet 0    metFORMIN (GLUCOPHAGE) 500 MG tablet Take 1 tablet (500 mg total) by mouth 2 (two) times daily. 180 tablet 1    tirzepatide (MOUNJARO) 5 mg/0.5 mL PnIj Inject 5 mg into the skin every 7 days. 12 Pen 0     No current facility-administered medications for this visit.     Review of patient's allergies indicates:   Allergen Reactions    Carboplatin      Had to go to ER.     Tuberculin ppd Rash     localized      Past Medical History:   Diagnosis Date    Anxiety     Arthritis     Back pain     Bilateral headaches 04/01/2016    Cirrhosis     Diabetes mellitus     type 2    Hepatomegaly 08/01/2014    Morbidly obese     Obesity, Class III, BMI 40-49.9 (morbid obesity) 04/01/2016    Ovarian cancer 12/01/2012    grade 1 endometrioid adenoca of the ovary    Pancreatitis     Sleep apnea     Small bowel " obstruction due to adhesions 12/01/2015    TB (pulmonary tuberculosis)     Thyroid disease     hypothyroid    Thyroid nodule      Past Surgical History:   Procedure Laterality Date    ABDOMINAL ADHESION SURGERY      done at same time with ovarian cystectomy    CHOLECYSTECTOMY  12/2015    HERNIA REPAIR  12/28/2015    25 x 20 cm Venrtralex ST mesh for repair done at time of [facility] for SBO.     HYSTERECTOMY  02/25/2013    adeline rso. prior lso. ovarian ca    leep      positive margins    left salpingo oophrectomy  07/2010    hemorrhagic cyst    OMENTECTOMY      partial right oophrectomy  12/2012    LMP of ovary    VA REMOVAL OF OVARY/TUBE(S)      THYROIDECTOMY Bilateral 11/16/2022    Procedure: THYROIDECTOMY;  Surgeon: Hugh Toussaint III, MD;  Location: [facility];  Service: General;  Laterality: Bilateral;    x lap  12/28/2015    post open cholecystectomy 2/21/2015.        Review of Systems   Constitutional:  Positive for weight loss. Negative for activity change, chills, fatigue, fever and unexpected weight change.   HENT:  Negative for hearing loss, rhinorrhea and trouble swallowing.    Eyes:  Negative for blurred vision, discharge and visual disturbance.   Respiratory:  Negative for chest tightness, shortness of breath and wheezing.    Cardiovascular:  Negative for chest pain, palpitations and leg swelling.   Gastrointestinal:  Negative for abdominal pain, blood in stool, constipation, diarrhea, nausea and vomiting.   Endocrine: Negative for cold intolerance, heat intolerance, polydipsia, polyphagia and polyuria.   Genitourinary:  Negative for difficulty urinating, dysuria, hematuria and menstrual problem.   Musculoskeletal:  Negative for arthralgias and joint swelling.   Skin:  Negative for pallor.   Neurological:  Negative for dizziness, tremors, seizures, speech difficulty, weakness and headaches.   Hematological:  Negative for adenopathy. Does not bruise/bleed easily.   Psychiatric/Behavioral:  Negative for  "confusion, dysphoric mood (controlled), sleep disturbance and suicidal ideas. The patient is not nervous/anxious.       OBJECTIVE:      Vitals:    03/19/24 1445   BP: 104/78   BP Location: Right arm   Patient Position: Sitting   BP Method: Large (Automatic)   Pulse: 65   Temp: 98.6 °F (37 °C)   TempSrc: Oral   SpO2: 95%   Weight: (!) 138.3 kg (304 lb 12.8 oz)   Height: 5' 7" (1.702 m)     Physical Exam  Vitals and nursing note reviewed.   Constitutional:       General: She is not in acute distress.     Appearance: Normal appearance. She is not ill-appearing.      Comments: Morbid obesity   HENT:      Mouth/Throat:      Mouth: Mucous membranes are moist.   Eyes:      General: No scleral icterus.     Pupils: Pupils are equal, round, and reactive to light.   Neck:      Comments: Thyroidectomy   Cardiovascular:      Rate and Rhythm: Normal rate and regular rhythm.      Pulses:           Dorsalis pedis pulses are 2+ on the right side and 2+ on the left side.        Posterior tibial pulses are 2+ on the right side and 2+ on the left side.   Pulmonary:      Effort: Pulmonary effort is normal. No respiratory distress.   Musculoskeletal:      Cervical back: Normal range of motion and neck supple.      Right lower leg: No edema.      Left lower leg: No edema.      Right foot: Normal range of motion. No deformity.      Left foot: Normal range of motion. No deformity.   Feet:      Right foot:      Protective Sensation: 10 sites tested.  10 sites sensed.      Skin integrity: No ulcer, blister, skin breakdown, erythema, warmth, callus or dry skin.      Toenail Condition: Right toenails are normal.      Left foot:      Protective Sensation: 10 sites tested.  10 sites sensed.      Skin integrity: No ulcer, blister, skin breakdown, erythema, warmth, callus or dry skin.      Toenail Condition: Left toenails are normal.   Lymphadenopathy:      Cervical: No cervical adenopathy.   Skin:     General: Skin is warm and dry.      " Coloration: Skin is not jaundiced or pale.   Neurological:      Mental Status: She is alert and oriented to person, place, and time.   Psychiatric:         Mood and Affect: Mood normal.         Behavior: Behavior normal.         Thought Content: Thought content normal.         Judgment: Judgment normal.        Assessment:       1. Type 2 diabetes mellitus with hyperglycemia, with long-term current use of insulin    2. Acquired hypothyroidism    3. Reactive depression    4. Mixed hyperlipidemia        Plan:       Type 2 diabetes mellitus with hyperglycemia, with long-term current use of insulin  -     tirzepatide (MOUNJARO) 5 mg/0.5 mL PnIj; Inject 5 mg into the skin every 7 days.  Dispense: 12 Pen; Refill: 0  -     metFORMIN (GLUCOPHAGE) 500 MG tablet; Take 1 tablet (500 mg total) by mouth 2 (two) times daily.  Dispense: 180 tablet; Refill: 1  -     atorvastatin (LIPITOR) 20 MG tablet; Take 1 tablet (20 mg total) by mouth every evening.  Dispense: 90 tablet; Refill: 1  -     HUMULIN R U-500, CONC, KWIKPEN 500 unit/mL (3 mL) insulin pen; TAKE 60 UNITS INTO SKIN BEFORE BREAKFAST, TAKE 75 UNITS BEFORE LUNCH AND TAKE 80 UNITS BEFORE DINNER  Dispense: 12 pen ; Refill: 0  -     Foot Exam Performed  -     Comprehensive Metabolic Panel; Future; Expected date: 06/21/2024  -     Hemoglobin A1C; Future; Expected date: 06/21/2024   -significant improvement, down to 7.6!   Goal < 7.0 discussed- continue meds and decreasing insulin use with better control; get eye exam     Acquired hypothyroidism  -     levothyroxine (SYNTHROID) 200 MCG tablet; Take 1 tablet (200 mcg total) by mouth before breakfast.  Dispense: 90 tablet; Refill: 0  -     TSH; Future; Expected date: 06/21/2024  -     T4, FREE; Future; Expected date: 06/21/2024   -controlled; cont current dose; f/u with endo as planned in 7/24 for papillary thyroid cancer hx     Reactive depression  -     buPROPion (WELLBUTRIN XL) 150 MG TB24 tablet; Take 1 tablet (150 mg total)  by mouth once daily.  Dispense: 90 tablet; Refill: 1   -stable, cont meds    Mixed hyperlipidemia  -     atorvastatin (LIPITOR) 20 MG tablet; Take 1 tablet (20 mg total) by mouth every evening.  Dispense: 90 tablet; Refill: 1   -cont statin daily      Follow up in about 3 months (around 6/19/2024) for DM, thyroid .      3/19/2024 ELIZABETH Santos, [place]    This note was created using psicofxp voice recognition software that occasionally misinterprets phrases or words.

## 2024-03-28 ENCOUNTER — PATIENT MESSAGE (OUTPATIENT)
Dept: ADMINISTRATIVE | Facility: HOSPITAL | Age: 39
End: 2024-03-28
Payer: MEDICAID

## 2024-05-28 ENCOUNTER — PATIENT MESSAGE (OUTPATIENT)
Dept: FAMILY MEDICINE | Facility: CLINIC | Age: 39
End: 2024-05-28
Payer: MEDICAID

## 2024-05-30 ENCOUNTER — PATIENT MESSAGE (OUTPATIENT)
Dept: FAMILY MEDICINE | Facility: CLINIC | Age: 39
End: 2024-05-30
Payer: MEDICAID

## 2024-05-30 LAB
ALBUMIN SERPL-MCNC: 4 G/DL (ref 3.6–5.1)
ALBUMIN/GLOB SERPL: 1.3 (CALC) (ref 1–2.5)
ALP SERPL-CCNC: 101 U/L (ref 31–125)
ALT SERPL-CCNC: 11 U/L (ref 6–29)
AST SERPL-CCNC: 13 U/L (ref 10–30)
BILIRUB SERPL-MCNC: 0.4 MG/DL (ref 0.2–1.2)
BUN SERPL-MCNC: 16 MG/DL (ref 7–25)
BUN/CREAT SERPL: NORMAL (CALC) (ref 6–22)
CALCIUM SERPL-MCNC: 9.4 MG/DL (ref 8.6–10.2)
CHLORIDE SERPL-SCNC: 104 MMOL/L (ref 98–110)
CO2 SERPL-SCNC: 29 MMOL/L (ref 20–32)
CREAT SERPL-MCNC: 0.63 MG/DL (ref 0.5–0.99)
EGFR: 114 ML/MIN/1.73M2
GLOBULIN SER CALC-MCNC: 3.2 G/DL (CALC) (ref 1.9–3.7)
GLUCOSE SERPL-MCNC: 77 MG/DL (ref 65–99)
HBA1C MFR BLD: 6.4 % OF TOTAL HGB
POTASSIUM SERPL-SCNC: 4.6 MMOL/L (ref 3.5–5.3)
PROT SERPL-MCNC: 7.2 G/DL (ref 6.1–8.1)
SODIUM SERPL-SCNC: 141 MMOL/L (ref 135–146)
T4 FREE SERPL-MCNC: 1.4 NG/DL (ref 0.8–1.8)
TSH SERPL-ACNC: 2.1 MIU/L

## 2024-06-05 ENCOUNTER — TELEPHONE (OUTPATIENT)
Dept: FAMILY MEDICINE | Facility: CLINIC | Age: 39
End: 2024-06-05
Payer: MEDICAID

## 2024-06-05 NOTE — TELEPHONE ENCOUNTER
----- Message from ABDIRASHID Saldaña-MAC sent at 6/5/2024 12:13 PM CDT -----  Please call patient.  She did not see the patient portal message from [facility]. A1c is below goal at 6.4!  Thyroid is controlled, other labs normal, continue medications as prescribed until follow-up appointment

## 2024-06-05 NOTE — PROGRESS NOTES
Please call patient.  She did not see the patient portal message from [facility]. A1c is below goal at 6.4!  Thyroid is controlled, other labs normal, continue medications as prescribed until follow-up appointment

## 2024-06-12 ENCOUNTER — LAB VISIT (OUTPATIENT)
Dept: LAB | Facility: HOSPITAL | Age: 39
End: 2024-06-12
Attending: NURSE PRACTITIONER
Payer: MEDICAID

## 2024-06-12 ENCOUNTER — PATIENT OUTREACH (OUTPATIENT)
Dept: ADMINISTRATIVE | Facility: HOSPITAL | Age: 39
End: 2024-06-12
Payer: MEDICAID

## 2024-06-12 ENCOUNTER — OFFICE VISIT (OUTPATIENT)
Dept: FAMILY MEDICINE | Facility: CLINIC | Age: 39
End: 2024-06-12
Payer: MEDICAID

## 2024-06-12 ENCOUNTER — TELEPHONE (OUTPATIENT)
Dept: FAMILY MEDICINE | Facility: CLINIC | Age: 39
End: 2024-06-12

## 2024-06-12 VITALS
TEMPERATURE: 98 F | DIASTOLIC BLOOD PRESSURE: 70 MMHG | BODY MASS INDEX: 44.68 KG/M2 | HEIGHT: 67 IN | RESPIRATION RATE: 20 BRPM | SYSTOLIC BLOOD PRESSURE: 106 MMHG | WEIGHT: 284.69 LBS | OXYGEN SATURATION: 97 % | HEART RATE: 65 BPM

## 2024-06-12 DIAGNOSIS — E03.9 ACQUIRED HYPOTHYROIDISM: ICD-10-CM

## 2024-06-12 DIAGNOSIS — Z01.818 PREOPERATIVE CLEARANCE: Primary | ICD-10-CM

## 2024-06-12 DIAGNOSIS — E11.65 TYPE 2 DIABETES MELLITUS WITH HYPERGLYCEMIA, WITH LONG-TERM CURRENT USE OF INSULIN: ICD-10-CM

## 2024-06-12 DIAGNOSIS — E66.01 CLASS 3 SEVERE OBESITY DUE TO EXCESS CALORIES WITH SERIOUS COMORBIDITY AND BODY MASS INDEX (BMI) OF 40.0 TO 44.9 IN ADULT: ICD-10-CM

## 2024-06-12 DIAGNOSIS — Z01.818 PREOPERATIVE CLEARANCE: ICD-10-CM

## 2024-06-12 DIAGNOSIS — Z79.4 TYPE 2 DIABETES MELLITUS WITH HYPERGLYCEMIA, WITH LONG-TERM CURRENT USE OF INSULIN: ICD-10-CM

## 2024-06-12 LAB
BASOPHILS # BLD AUTO: 0.06 K/UL (ref 0–0.2)
BASOPHILS NFR BLD: 0.7 % (ref 0–1.9)
DIFFERENTIAL METHOD BLD: ABNORMAL
EOSINOPHIL # BLD AUTO: 0.3 K/UL (ref 0–0.5)
EOSINOPHIL NFR BLD: 3.4 % (ref 0–8)
ERYTHROCYTE [DISTWIDTH] IN BLOOD BY AUTOMATED COUNT: 15 % (ref 11.5–14.5)
HCT VFR BLD AUTO: 43 % (ref 37–48.5)
HGB BLD-MCNC: 13.6 G/DL (ref 12–16)
IMM GRANULOCYTES # BLD AUTO: 0.02 K/UL (ref 0–0.04)
IMM GRANULOCYTES NFR BLD AUTO: 0.2 % (ref 0–0.5)
LYMPHOCYTES # BLD AUTO: 2.5 K/UL (ref 1–4.8)
LYMPHOCYTES NFR BLD: 31.3 % (ref 18–48)
MCH RBC QN AUTO: 27.6 PG (ref 27–31)
MCHC RBC AUTO-ENTMCNC: 31.6 G/DL (ref 32–36)
MCV RBC AUTO: 87 FL (ref 82–98)
MONOCYTES # BLD AUTO: 0.7 K/UL (ref 0.3–1)
MONOCYTES NFR BLD: 8.6 % (ref 4–15)
NEUTROPHILS # BLD AUTO: 4.5 K/UL (ref 1.8–7.7)
NEUTROPHILS NFR BLD: 55.8 % (ref 38–73)
NRBC BLD-RTO: 0 /100 WBC
PLATELET # BLD AUTO: 204 K/UL (ref 150–450)
PMV BLD AUTO: 11.3 FL (ref 9.2–12.9)
RBC # BLD AUTO: 4.92 M/UL (ref 4–5.4)
WBC # BLD AUTO: 8.05 K/UL (ref 3.9–12.7)

## 2024-06-12 PROCEDURE — 99999 PR PBB SHADOW E&M-EST. PATIENT-LVL V: CPT | Mod: PBBFAC,,, | Performed by: NURSE PRACTITIONER

## 2024-06-12 PROCEDURE — 3074F SYST BP LT 130 MM HG: CPT | Mod: CPTII,,, | Performed by: NURSE PRACTITIONER

## 2024-06-12 PROCEDURE — 1159F MED LIST DOCD IN RCRD: CPT | Mod: CPTII,,, | Performed by: NURSE PRACTITIONER

## 2024-06-12 PROCEDURE — 3008F BODY MASS INDEX DOCD: CPT | Mod: CPTII,,, | Performed by: NURSE PRACTITIONER

## 2024-06-12 PROCEDURE — 36415 COLL VENOUS BLD VENIPUNCTURE: CPT | Performed by: NURSE PRACTITIONER

## 2024-06-12 PROCEDURE — 3078F DIAST BP <80 MM HG: CPT | Mod: CPTII,,, | Performed by: NURSE PRACTITIONER

## 2024-06-12 PROCEDURE — 99214 OFFICE O/P EST MOD 30 MIN: CPT | Mod: S$PBB,,, | Performed by: NURSE PRACTITIONER

## 2024-06-12 PROCEDURE — 3044F HG A1C LEVEL LT 7.0%: CPT | Mod: CPTII,,, | Performed by: NURSE PRACTITIONER

## 2024-06-12 PROCEDURE — 1160F RVW MEDS BY RX/DR IN RCRD: CPT | Mod: CPTII,,, | Performed by: NURSE PRACTITIONER

## 2024-06-12 PROCEDURE — 99215 OFFICE O/P EST HI 40 MIN: CPT | Mod: PBBFAC,PN | Performed by: NURSE PRACTITIONER

## 2024-06-12 PROCEDURE — 85025 COMPLETE CBC W/AUTO DIFF WBC: CPT | Performed by: NURSE PRACTITIONER

## 2024-06-12 RX ORDER — TIRZEPATIDE 5 MG/.5ML
5 INJECTION, SOLUTION SUBCUTANEOUS
Qty: 4 PEN | Refills: 2 | Status: SHIPPED | OUTPATIENT
Start: 2024-06-12

## 2024-06-12 NOTE — PROGRESS NOTES
Population Health Chart Review & Patient Outreach Details      Additional [facility] Notes:               Updates Requested / Reviewed:      Updated Care Coordination Note, Care Everywhere, , Care Team Updated, and Immunizations Reconciliation Completed or Queried: [place]         Health Maintenance Topics Overdue:      [facility] Score: 1     Mammogram                       Health Maintenance Topic(s) Outreach Outcomes & Actions Taken:        Breast Cancer Screening - Outreach Outcomes & Actions Taken  : Mammogram Order Placed

## 2024-06-12 NOTE — PROGRESS NOTES
SUBJECTIVE:      Patient ID: Marguerite Colon is a 39 y.o. female.    Chief Complaint: Pre-op Exam    Marguerite is here for pre-op surgery clearance today. Planning on having bariatric surgery, lap gastric sleeve with Dr. French, date to be scheduled when clearance received. PMH includes Type 2 DM, acquired hypothyroidism, papillary thyroid carcinoma, ORI with nightly CPAP use, HLD, fatty liver disease, and depression. Taking all medications as prescribed without SE or complaints.  Another 20 lb since her visit 3 months ago. A1C down to 6.4- was at 9.3 seven mo ago!  Recent labs reviewed with patient today.  Thyroid is controlled on current medication regimen.  She has an endocrinology appointment at [facility] tomorrow for follow-up on papillary thyroid carcinoma.  She is feeling well at this time and denies any complaints.  We have reviewed her recent blood sugar log.     History:   No Myocardial infarction <6 weeks previously  No Unstable angina  No Decompensated congestive heart failure  No Significant arrhythmias (e.g., causing hemodynamic instability)  No Chest Pain   No Lower extremity edema  No Dyspnea with exertion  No hx of Claudication   No Wheezing   Good Exercise tolerance (ability to perform 4 METS of Exercise)- Yes   No Obstructive Sleep Apnea symptoms (loud snoring, gasping, Choking)- uses CPAP nightly   No Upper respiratory symptoms in the last 2 weeks  No Bleeding Disorders   Blood thinner use - none   No Anemia History   No problems with Anesthesia in the past          Family History   Problem Relation Name Age of Onset    Diabetes Mother      Heart disease Mother      Stroke Mother      Hypertension Mother      Diabetes Father      Ovarian cancer Neg Hx      Uterine cancer Neg Hx      Breast cancer Neg Hx      Colon cancer Neg Hx        Social History     Socioeconomic History    Marital status: Other   Tobacco Use    Smoking status: Every Day     Current packs/day: 0.25     Average packs/day: 0.3  packs/day for 20.0 years (5.0 ttl pk-yrs)     Types: Cigarettes    Smokeless tobacco: Never   Substance and Sexual Activity    Alcohol use: No    Drug use: No    Sexual activity: Not Currently     Partners: Male     Social Determinants of Health     Financial Resource Strain: Patient Declined (6/3/2024)    Received from [facility]    Overall Financial Resource Strain (CARDIA)     Difficulty of Paying Living Expenses: Patient declined   Food Insecurity: Patient Declined (6/3/2024)    Received from [facility]    Hunger Vital Sign     Worried About Running Out of Food in the Last Year: Patient declined     Ran Out of Food in the Last Year: Patient declined   Transportation Needs: No Transportation Needs (6/3/2024)    Received from [facility]    PRAPARE - Transportation     Lack of Transportation (Medical): No     Lack of Transportation (Non-Medical): No   Physical Activity: Sufficiently Active (6/3/2024)    Received from [facility]    Exercise Vital Sign     Days of Exercise per Week: 5 days     Minutes of Exercise per Session: 30 min   Stress: Patient Declined (6/3/2024)    Received from [facility]    [employer] - Occupational Stress Questionnaire     Feeling of Stress : Patient declined   Housing Stability: Low Risk  (1/21/2024)    Housing Stability Vital Sign     Unable to Pay for Housing in the Last Year: No     Number of Places Lived in the Last Year: 1     Unstable Housing in the Last Year: No     Current Outpatient Medications   Medication Sig Dispense Refill    atorvastatin (LIPITOR) 20 MG tablet Take 1 tablet (20 mg total) by mouth every evening. 90 tablet 1    buPROPion (WELLBUTRIN XL) 150 MG TB24 tablet Take 1 tablet (150 mg total) by mouth once daily. 90 tablet 1    HUMULIN R U-500, CONC, KWIKPEN 500 unit/mL (3 mL) insulin pen TAKE 60 UNITS INTO SKIN BEFORE BREAKFAST, TAKE 75 UNITS BEFORE LUNCH AND TAKE 80 UNITS BEFORE DINNER 12 pen 0    levothyroxine (SYNTHROID) 200 MCG  "tablet Take 1 tablet (200 mcg total) by mouth before breakfast. 90 tablet 0    metFORMIN (GLUCOPHAGE) 500 MG tablet Take 1 tablet (500 mg total) by mouth 2 (two) times daily. 180 tablet 1    tapinarof (VTAMA) 1 % Crea Use on AA once daily 60 g 1    insulin needles, disposable, 30 x 1/3 " Ndle Uses 4 daily, on multiple daily insulin injections 150 each 12    tirzepatide (MOUNJARO) 5 mg/0.5 mL PnIj Inject 5 mg into the skin every 7 days. 4 Pen 2     No current facility-administered medications for this visit.     Review of patient's allergies indicates:   Allergen Reactions    Carboplatin      Had to go to ER.     Tuberculin ppd Rash     localized      Past Medical History:   Diagnosis Date    Anxiety     Arthritis     Back pain     Bilateral headaches 04/01/2016    Cirrhosis     Diabetes mellitus     type 2    Hepatomegaly 08/01/2014    Morbidly obese     Obesity, Class III, BMI 40-49.9 (morbid obesity) 04/01/2016    Ovarian cancer 12/01/2012    grade 1 endometrioid adenoca of the ovary    Pancreatitis     Sleep apnea     Small bowel obstruction due to adhesions 12/01/2015    TB (pulmonary tuberculosis)     Thyroid disease     hypothyroid    Thyroid nodule      Past Surgical History:   Procedure Laterality Date    ABDOMINAL ADHESION SURGERY      done at same time with ovarian cystectomy    CHOLECYSTECTOMY  12/2015    HERNIA REPAIR  12/28/2015    25 x 20 cm Venrtralex ST mesh for repair done at time of [facility] for SBO.     HYSTERECTOMY  02/25/2013    adeline rso. prior lso. ovarian ca    leep      positive margins    left salpingo oophrectomy  07/2010    hemorrhagic cyst    OMENTECTOMY      partial right oophrectomy  12/2012    LMP of ovary    [place] REMOVAL OF OVARY/TUBE(S)      THYROIDECTOMY Bilateral 11/16/2022    Procedure: THYROIDECTOMY;  Surgeon: Hugh Toussaint III, MD;  Location: [facility] OR;  Service: General;  Laterality: Bilateral;    x lap  12/28/2015    post open cholecystectomy 2/21/2015.        Review of Systems " "  Constitutional:  Negative for activity change, appetite change, chills, fatigue, fever and unexpected weight change.   HENT:  Negative for congestion, hearing loss, rhinorrhea, sore throat, trouble swallowing and voice change.    Eyes:  Negative for pain, discharge and visual disturbance.        [facility]   Respiratory:  Positive for apnea (uses CPAP nightly). Negative for chest tightness, shortness of breath and wheezing.    Cardiovascular:  Negative for chest pain, palpitations and leg swelling.   Gastrointestinal:  Positive for constipation (Mild, controlled with over-the-counter stool softeners and diet). Negative for abdominal pain, blood in stool, diarrhea, nausea and vomiting.        Denies GERD or hiatal hernia history   Endocrine: Negative for cold intolerance, heat intolerance, polydipsia, polyphagia and polyuria.   Genitourinary:  Negative for difficulty urinating, dysuria, frequency, hematuria, menstrual problem and pelvic pain.   Musculoskeletal:  Negative for arthralgias, joint swelling and neck pain.   Skin:  Negative for pallor and wound.   Neurological:  Negative for dizziness, tremors, seizures, speech difficulty, weakness and headaches.   Hematological:  Negative for adenopathy. Does not bruise/bleed easily.   Psychiatric/Behavioral:  Negative for confusion, dysphoric mood, sleep disturbance and suicidal ideas. The patient is not nervous/anxious.       OBJECTIVE:      Vitals:    06/12/24 0821   BP: 106/70   BP Location: Right arm   Patient Position: Sitting   BP Method: Large (Manual)   Pulse: 65   Resp: 20   Temp: 98.4 °F (36.9 °C)   TempSrc: Oral   SpO2: 97%   Weight: 129.1 kg (284 lb 11.2 oz)   Height: 5' 7" (1.702 m)     Physical Exam  Vitals and nursing note reviewed.   Constitutional:       General: She is not in acute distress.     Appearance: Normal appearance. She is not ill-appearing or diaphoretic.      Comments: Morbid obesity   HENT:      " Head: Normocephalic.      Right Ear: Tympanic membrane, ear canal and external ear normal.      Left Ear: Tympanic membrane, ear canal and external ear normal.      Nose: Nose normal.      Mouth/Throat:      Mouth: Mucous membranes are moist.      Pharynx: Oropharynx is clear. No oropharyngeal exudate or posterior oropharyngeal erythema.   Eyes:      General: No scleral icterus.     Extraocular Movements: Extraocular movements intact.      Conjunctiva/sclera: Conjunctivae normal.      Pupils: Pupils are equal, round, and reactive to light.   Neck:      Comments: Thyroidectomy   Cardiovascular:      Rate and Rhythm: Normal rate and regular rhythm.      Pulses: Normal pulses.      Heart sounds: Normal heart sounds. No murmur heard.  Pulmonary:      Effort: Pulmonary effort is normal. No respiratory distress.      Breath sounds: Normal breath sounds. No wheezing or rales.   Abdominal:      General: Bowel sounds are normal.      Palpations: Abdomen is soft.      Tenderness: There is no abdominal tenderness.   Musculoskeletal:      Cervical back: Normal range of motion and neck supple.      Right lower leg: No edema.      Left lower leg: No edema.   Lymphadenopathy:      Cervical: No cervical adenopathy.   Skin:     General: Skin is warm and dry.      Coloration: Skin is not jaundiced or pale.   Neurological:      Mental Status: She is alert and oriented to person, place, and time.   Psychiatric:         Mood and Affect: Mood normal.         Behavior: Behavior normal.         Thought Content: Thought content normal.         Judgment: Judgment normal.        Assessment:       1. Preoperative clearance    2. Class 3 severe obesity due to excess calories with serious comorbidity and body mass index (BMI) of 40.0 to 44.9 in adult    3. Type 2 diabetes mellitus with hyperglycemia, with long-term current use of insulin    4. Acquired hypothyroidism        Plan:       Preoperative clearance  -     Ambulatory referral/consult to  Cardiology; Future; Expected date: 06/19/2024  -     CBC Auto Differential; Future; Expected date: 06/12/2024   -*pt able to complete >4 METS without symptoms     *According to the Revised Cardiac Risk Index, pt has a 6.0% risk of cardiac event after this non-cardiac surgery; may proceed with surgery, medically cleared to proceed; recommend Cardiology evaluation as per surgery guidelines as well as Psychiatry and other recommendations as per surgeon     Class 3 severe obesity due to excess calories with serious comorbidity and body mass index (BMI) of 40.0 to 44.9 in adult    Type 2 diabetes mellitus with hyperglycemia, with long-term current use of insulin  -     CBC Auto Differential; Future; Expected date: 06/12/2024  -     tirzepatide (MOUNJARO) 5 mg/0.5 mL PnIj; Inject 5 mg into the skin every 7 days.  Dispense: 4 Pen; Refill: 2  -diabetes control, A1c below goal at 6.4; patient is not currently using her insulin; continue metformin and monitoring blood sugar; recommend hold Mounjaro at least 7 days before surgery or as her surgeon recommends    Acquired hypothyroidism   -continue current dose of medication, TSH stable; follow-up with endocrinology as planned tomorrow      As with all procedures, there is inherent risk of multiple complications including those related to bleeding, infectious, cardiac, and pulmonary.     Please stop Aspirin and NSAIDs one week prior to surgery.      All smokers should quit smoking eight or more weeks prior to procedure to minimize complications associated with smoking. Reduction or cessation of smoking for less than four to eight weeks before surgery is of questionable benefit, and has actually been shown in some studies to result in higher complication rates.  Alcohol should be used in moderation.  Any pt with cardiopulmonary disease may warrant repeat examination prior to procedure along with directions for deep breathing exercises and incentive spirometry.    Patients at high  risk for complications usually warrant cardiology consultation and possibly angiography. Cardiac stress testing should be performed in patients at intermediate risk and with poor functional capacity or who are undergoing high-risk procedures, such as vascular surgery. For patients with minor clinical predictors, only patients who have poor functional capacity and are undergoing a high-risk procedure require stress testing. Patients with positive stress test results warrant cardiology consultation before proceeding with surgery.     Predisposing risk factors for pulmonary complications include cough, dyspnea, smoking, a history of lung disease, obesity and abdominal or thoracic surgery.    Any pts > 70 years old may be at risk for delirium or cardiac complications.  Furthermore, diabetic patients may be at risk for infection or prolonged healing.      I spent a total of 31 minutes on the day of the visit.This includes face to face time and non-face to face time preparing to see the patient (eg, review of tests), obtaining and/or reviewing separately obtained history, documenting clinical information in the electronic or other health record, independently interpreting results and communicating results to the patient/family/caregiver, or care coordinator.       No follow-ups on file.      6/12/2024 ELIZABETH Santos, LEWISP    This note was created using [employer] voice recognition software that occasionally misinterprets phrases or words.

## 2024-07-24 ENCOUNTER — PATIENT MESSAGE (OUTPATIENT)
Dept: FAMILY MEDICINE | Facility: CLINIC | Age: 39
End: 2024-07-24
Payer: MEDICAID

## 2024-08-12 ENCOUNTER — PATIENT MESSAGE (OUTPATIENT)
Dept: FAMILY MEDICINE | Facility: CLINIC | Age: 39
End: 2024-08-12
Payer: MEDICAID

## 2024-08-20 ENCOUNTER — TELEPHONE (OUTPATIENT)
Dept: FAMILY MEDICINE | Facility: CLINIC | Age: 39
End: 2024-08-20
Payer: MEDICAID

## 2024-09-06 ENCOUNTER — TELEPHONE (OUTPATIENT)
Dept: BARIATRICS | Facility: CLINIC | Age: 39
End: 2024-09-06
Payer: MEDICAID

## 2024-09-06 NOTE — TELEPHONE ENCOUNTER
Left msg explaining we no longer have a doctor at this time, and we don't accept her insurance as a primary at this time.

## 2024-09-06 NOTE — TELEPHONE ENCOUNTER
----- Message from Ragini Barbosa sent at 9/5/2024  5:21 PM CDT -----  Type:  Same Day Appointment Request    Caller is requesting a same day appointment.  Caller declined first available appointment listed below.    Name of Caller:pt  When is the first available appointment?n/a  Symptoms:possible bariatric surg  Best Call Back Number:[phone]  Additional Information:

## 2024-10-02 ENCOUNTER — OFFICE VISIT (OUTPATIENT)
Dept: FAMILY MEDICINE | Facility: CLINIC | Age: 39
End: 2024-10-02
Payer: MEDICAID

## 2024-10-02 VITALS
SYSTOLIC BLOOD PRESSURE: 122 MMHG | TEMPERATURE: 98 F | OXYGEN SATURATION: 97 % | HEIGHT: 67 IN | BODY MASS INDEX: 41.89 KG/M2 | HEART RATE: 71 BPM | DIASTOLIC BLOOD PRESSURE: 72 MMHG | RESPIRATION RATE: 18 BRPM | WEIGHT: 266.88 LBS

## 2024-10-02 DIAGNOSIS — Z12.31 SCREENING MAMMOGRAM FOR BREAST CANCER: ICD-10-CM

## 2024-10-02 DIAGNOSIS — F32.9 REACTIVE DEPRESSION: ICD-10-CM

## 2024-10-02 DIAGNOSIS — E66.813 CLASS 3 SEVERE OBESITY DUE TO EXCESS CALORIES WITH SERIOUS COMORBIDITY AND BODY MASS INDEX (BMI) OF 40.0 TO 44.9 IN ADULT: ICD-10-CM

## 2024-10-02 DIAGNOSIS — Z79.4 TYPE 2 DIABETES MELLITUS WITH HYPERGLYCEMIA, WITH LONG-TERM CURRENT USE OF INSULIN: Primary | ICD-10-CM

## 2024-10-02 DIAGNOSIS — E66.01 CLASS 3 SEVERE OBESITY DUE TO EXCESS CALORIES WITH SERIOUS COMORBIDITY AND BODY MASS INDEX (BMI) OF 40.0 TO 44.9 IN ADULT: ICD-10-CM

## 2024-10-02 DIAGNOSIS — E78.2 MIXED HYPERLIPIDEMIA: ICD-10-CM

## 2024-10-02 DIAGNOSIS — E11.65 TYPE 2 DIABETES MELLITUS WITH HYPERGLYCEMIA, WITH LONG-TERM CURRENT USE OF INSULIN: Primary | ICD-10-CM

## 2024-10-02 PROCEDURE — 1159F MED LIST DOCD IN RCRD: CPT | Mod: CPTII,,, | Performed by: NURSE PRACTITIONER

## 2024-10-02 PROCEDURE — 3008F BODY MASS INDEX DOCD: CPT | Mod: CPTII,,, | Performed by: NURSE PRACTITIONER

## 2024-10-02 PROCEDURE — 1160F RVW MEDS BY RX/DR IN RCRD: CPT | Mod: CPTII,,, | Performed by: NURSE PRACTITIONER

## 2024-10-02 PROCEDURE — 99999 PR PBB SHADOW E&M-EST. PATIENT-LVL IV: CPT | Mod: PBBFAC,,, | Performed by: NURSE PRACTITIONER

## 2024-10-02 PROCEDURE — 99214 OFFICE O/P EST MOD 30 MIN: CPT | Mod: S$PBB,,, | Performed by: NURSE PRACTITIONER

## 2024-10-02 PROCEDURE — G2211 COMPLEX E/M VISIT ADD ON: HCPCS | Mod: S$PBB,,, | Performed by: NURSE PRACTITIONER

## 2024-10-02 PROCEDURE — 99214 OFFICE O/P EST MOD 30 MIN: CPT | Mod: PBBFAC,PN | Performed by: NURSE PRACTITIONER

## 2024-10-02 PROCEDURE — 2023F DILAT RTA XM W/O RTNOPTHY: CPT | Mod: CPTII,,, | Performed by: NURSE PRACTITIONER

## 2024-10-02 PROCEDURE — 3044F HG A1C LEVEL LT 7.0%: CPT | Mod: CPTII,,, | Performed by: NURSE PRACTITIONER

## 2024-10-02 PROCEDURE — 3074F SYST BP LT 130 MM HG: CPT | Mod: CPTII,,, | Performed by: NURSE PRACTITIONER

## 2024-10-02 PROCEDURE — 3078F DIAST BP <80 MM HG: CPT | Mod: CPTII,,, | Performed by: NURSE PRACTITIONER

## 2024-10-02 RX ORDER — LEVOTHYROXINE SODIUM 25 UG/1
25 TABLET ORAL EVERY MORNING
COMMUNITY

## 2024-10-02 RX ORDER — METFORMIN HYDROCHLORIDE 500 MG/1
500 TABLET ORAL 2 TIMES DAILY
Qty: 180 TABLET | Refills: 1 | Status: SHIPPED | OUTPATIENT
Start: 2024-10-02

## 2024-10-02 RX ORDER — BUPROPION HYDROCHLORIDE 150 MG/1
150 TABLET ORAL DAILY
Qty: 90 TABLET | Refills: 1 | Status: SHIPPED | OUTPATIENT
Start: 2024-10-02

## 2024-10-02 RX ORDER — ATORVASTATIN CALCIUM 20 MG/1
20 TABLET, FILM COATED ORAL NIGHTLY
Qty: 90 TABLET | Refills: 1 | Status: SHIPPED | OUTPATIENT
Start: 2024-10-02

## 2024-10-02 NOTE — PROGRESS NOTES
SUBJECTIVE:      Patient ID: Marguerite Colon is a 39 y.o. female.    Chief Complaint: Diabetes    Marguerite is here for follow up on DM, HLD and depression today. Taking  Mounjaro 5 mg weekly, along with Metformin as prescribed daily and Insulin some days. Down another 20 lbs since last visit for a total of 77 lbs total since starting medication. BS have significantly improved- between  fasting.  A1c this month was 6.4.  Saw new endo at [facility] with f/u recently in 9/24. Thyroid well-controlled on current dose of medicine.  Patient did not have bariatric surgery due to adhesions and scar tissue-has an appointment with Jennifer for 2nd opinion.  Recent lab results reviewed today.    Diabetes  She presents for her follow-up diabetic visit. She has type 2 diabetes mellitus. No MedicAlert identification noted. The initial diagnosis of diabetes was made 11 years ago. Her disease course has been stable. Pertinent negatives for hypoglycemia include no confusion, dizziness, headaches, hunger, mood changes, nervousness/anxiousness, pallor, seizures, sleepiness, speech difficulty, sweats or tremors. Associated symptoms include weight loss. Pertinent negatives for diabetes include no blurred vision, no chest pain, no fatigue, no foot paresthesias, no foot ulcerations, no polydipsia, no polyphagia, no polyuria, no visual change and no weakness. Pertinent negatives for hypoglycemia complications include no blackouts, no hospitalization, no nocturnal hypoglycemia, no required assistance and no required glucagon injection. Symptoms are resolved. Pertinent negatives for diabetic complications include no autonomic neuropathy, CVA, heart disease, nephropathy, peripheral neuropathy, PVD or retinopathy. Risk factors for coronary artery disease include dyslipidemia, family history, obesity, post-menopausal, stress, tobacco exposure and diabetes mellitus. Current diabetic treatment includes insulin injections, oral agent (dual therapy) and  diet. She is compliant with treatment most of the time. She is currently taking insulin pre-breakfast, pre-lunch and pre-dinner. Insulin injections are given by patient. Rotation sites for injection include the abdominal wall. Her weight is decreasing steadily. She is following a diabetic diet. Meal planning includes avoidance of concentrated sweets, calorie counting and carbohydrate counting. She has had a previous visit with a dietitian. She participates in exercise every other day. She monitors blood glucose at home 3-4 x per day. She monitors urine at home <1 x per month. Blood glucose monitoring compliance is fair. Her home blood glucose trend is decreasing steadily. Her breakfast blood glucose range is generally  mg/dl. She sees a podiatrist.Eye exam is not current.       Family History   Problem Relation Name Age of Onset    Diabetes Mother      Heart disease Mother      Stroke Mother      Hypertension Mother      Diabetes Father      Ovarian cancer Neg Hx      Uterine cancer Neg Hx      Breast cancer Neg Hx      Colon cancer Neg Hx        Social History     Socioeconomic History    Marital status: Other   Tobacco Use    Smoking status: Every Day     Current packs/day: 0.25     Average packs/day: 0.3 packs/day for 20.0 years (5.0 ttl pk-yrs)     Types: Cigarettes    Smokeless tobacco: Never   Substance and Sexual Activity    Alcohol use: No    Drug use: No    Sexual activity: Not Currently     Partners: Male     Social Drivers of Health     Financial Resource Strain: Patient Declined (6/3/2024)    Received from [facility] [place] [place] [facility]    Overall Financial Resource Strain (CARDIA)     Difficulty of Paying Living Expenses: Patient declined   Food Insecurity: No Food Insecurity (9/4/2024)    Received from [facility] [place] [place] [facility]    Hunger Vital Sign     Worried About Running Out of Food in the Last Year: Never true     Ran Out of Food in the Last Year: Never true   Transportation Needs: No Transportation Needs (9/4/2024)     "Received from [facility]    PRAPARE - Transportation     Lack of Transportation (Medical): No     Lack of Transportation (Non-Medical): No   Physical Activity: Sufficiently Active (6/3/2024)    Received from [facility]    Exercise Vital Sign     Days of Exercise per Week: 5 days     Minutes of Exercise per Session: 30 min   Stress: Patient Declined (6/3/2024)    Received from [facility]    [employer] - Occupational Stress Questionnaire     Feeling of Stress : Patient declined   Housing Stability: Low Risk  (9/4/2024)    Received from [facility]    Housing Stability Vital Sign     Unable to Pay for Housing in the Last Year: No     Number of Places Lived in the Last Year: 1     Unstable Housing in the Last Year: No     Current Outpatient Medications   Medication Sig Dispense Refill    HUMULIN R U-500, CONC, KWIKPEN 500 unit/mL (3 mL) insulin pen TAKE 60 UNITS INTO SKIN BEFORE BREAKFAST, TAKE 75 UNITS BEFORE LUNCH AND TAKE 80 UNITS BEFORE DINNER 12 pen 0    levothyroxine (SYNTHROID) 200 MCG tablet Take 1 tablet (200 mcg total) by mouth before breakfast. 90 tablet 0    levothyroxine (SYNTHROID) 25 MCG tablet Take 25 mcg by mouth every morning.      tapinarof (VTAMA) 1 % Crea Use on AA once daily 60 g 1    tirzepatide (MOUNJARO) 5 mg/0.5 mL PnIj Inject 5 mg into the skin every 7 days. 4 Pen 2    atorvastatin (LIPITOR) 20 MG tablet Take 1 tablet (20 mg total) by mouth every evening. 90 tablet 1    buPROPion (WELLBUTRIN XL) 150 MG TB24 tablet Take 1 tablet (150 mg total) by mouth once daily. 90 tablet 1    insulin needles, disposable, 30 x 1/3 " Ndle Uses 4 daily, on multiple daily insulin injections 150 each 12    metFORMIN (GLUCOPHAGE) 500 MG tablet Take 1 tablet (500 mg total) by mouth 2 (two) times daily. 180 tablet 1     No current facility-administered medications for this visit.     Review of patient's allergies indicates:   Allergen Reactions    Carboplatin      Had to go to ER.  "    Tuberculin ppd Rash     localized      Past Medical History:   Diagnosis Date    Anxiety     Arthritis     Back pain     Bilateral headaches 04/01/2016    Cirrhosis     Diabetes mellitus     type 2    Hepatomegaly 08/01/2014    Morbidly obese     Obesity, Class III, BMI 40-49.9 (morbid obesity) 04/01/2016    Ovarian cancer 12/01/2012    grade 1 endometrioid adenoca of the ovary    Pancreatitis     Sleep apnea     Small bowel obstruction due to adhesions 12/01/2015    TB (pulmonary tuberculosis)     Thyroid disease     hypothyroid    Thyroid nodule      Past Surgical History:   Procedure Laterality Date    ABDOMINAL ADHESION SURGERY      done at same time with ovarian cystectomy    CHOLECYSTECTOMY  12/2015    HERNIA REPAIR  12/28/2015    25 x 20 cm Venrtralex ST mesh for repair done at time of [facility] for SBO.     HYSTERECTOMY  02/25/2013    [facility] rso. prior lso. ovarian ca    leep      positive margins    left salpingo oophrectomy  07/2010    hemorrhagic cyst    OMENTECTOMY      partial right oophrectomy  12/2012    LMP of ovary    [place] REMOVAL OF OVARY/TUBE(S)      THYROIDECTOMY Bilateral 11/16/2022    Procedure: THYROIDECTOMY;  Surgeon: Hugh Toussaint III, MD;  Location: [facility];  Service: General;  Laterality: Bilateral;    x lap  12/28/2015    post open cholecystectomy 2/21/2015.        Review of Systems   Constitutional:  Positive for weight loss. Negative for activity change, chills, fatigue, fever and unexpected weight change.   HENT:  Negative for hearing loss, rhinorrhea and trouble swallowing.    Eyes:  Negative for blurred vision, discharge and visual disturbance.   Respiratory:  Negative for chest tightness, shortness of breath and wheezing.    Cardiovascular:  Negative for chest pain, palpitations and leg swelling.   Gastrointestinal:  Negative for abdominal pain, blood in stool, constipation, diarrhea, nausea and vomiting.   Endocrine: Negative for cold intolerance, heat intolerance, polydipsia,  "polyphagia and polyuria.   Genitourinary:  Negative for difficulty urinating, dysuria, hematuria and menstrual problem.   Musculoskeletal:  Negative for arthralgias and joint swelling.   Skin:  Negative for pallor.   Neurological:  Negative for dizziness, tremors, seizures, speech difficulty, weakness and headaches.   Hematological:  Negative for adenopathy. Does not bruise/bleed easily.   Psychiatric/Behavioral:  Negative for confusion, dysphoric mood (controlled), sleep disturbance and suicidal ideas. The patient is not nervous/anxious.       OBJECTIVE:      Vitals:    10/02/24 1439   BP: 122/72   BP Location: Right arm   Patient Position: Sitting   Pulse: 71   Resp: 18   Temp: 98.2 °F (36.8 °C)   TempSrc: Oral   SpO2: 97%   Weight: 121.1 kg (266 lb 14.4 oz)   Height: 5' 7" (1.702 m)     Physical Exam  Vitals and nursing note reviewed. Exam conducted with a chaperone present.   Constitutional:       General: She is not in acute distress.     Appearance: Normal appearance. She is obese. She is not ill-appearing.      Comments: Morbid obesity   HENT:      Mouth/Throat:      Mouth: Mucous membranes are moist.   Eyes:      General: No scleral icterus.     Pupils: Pupils are equal, round, and reactive to light.   Neck:      Comments: Thyroidectomy   Cardiovascular:      Rate and Rhythm: Normal rate and regular rhythm.      Heart sounds: Normal heart sounds.   Pulmonary:      Effort: Pulmonary effort is normal. No respiratory distress.      Breath sounds: Normal breath sounds. No wheezing or rales.   Musculoskeletal:      Cervical back: Normal range of motion and neck supple.      Right lower leg: No edema.      Left lower leg: No edema.   Lymphadenopathy:      Cervical: No cervical adenopathy.   Skin:     General: Skin is warm and dry.      Coloration: Skin is not jaundiced or pale.   Neurological:      Mental Status: She is alert and oriented to person, place, and time.   Psychiatric:         Mood and Affect: Mood " normal.         Behavior: Behavior normal.         Thought Content: Thought content normal.         Judgment: Judgment normal.        Assessment:       1. Type 2 diabetes mellitus with hyperglycemia, with long-term current use of insulin    2. Reactive depression    3. Mixed hyperlipidemia    4. Screening mammogram for breast cancer    5. Class 3 severe obesity due to excess calories with serious comorbidity and body mass index (BMI) of 40.0 to 44.9 in adult        Plan:       Type 2 diabetes mellitus with hyperglycemia, with long-term current use of insulin  -     metFORMIN (GLUCOPHAGE) 500 MG tablet; Take 1 tablet (500 mg total) by mouth 2 (two) times daily.  Dispense: 180 tablet; Refill: 1  -     atorvastatin (LIPITOR) 20 MG tablet; Take 1 tablet (20 mg total) by mouth every evening.  Dispense: 90 tablet; Refill: 1  -A1C below goal; cont meds per endo- refills for now until her appt next week     Reactive depression  -     buPROPion (WELLBUTRIN XL) 150 MG TB24 tablet; Take 1 tablet (150 mg total) by mouth once daily.  Dispense: 90 tablet; Refill: 1  -stable/controlled; cont medicine     Mixed hyperlipidemia  -     atorvastatin (LIPITOR) 20 MG tablet; Take 1 tablet (20 mg total) by mouth every evening.  Dispense: 90 tablet; Refill: 1  -lipids reviewed; cont statin     Screening mammogram for breast cancer  -     Mammo Digital Screening Bilat w/ Elvis; Future; Expected date: 10/02/2024    Class 3 severe obesity due to excess calories with serious comorbidity and body mass index (BMI) of 40.0 to 44.9 in adult        Follow up in about 6 months (around 4/2/2025) for annual .      10/2/2024 ELIZABETH Santos, FNP    This note was created using [employer] voice recognition software that occasionally misinterprets phrases or words.

## 2024-10-14 ENCOUNTER — PATIENT MESSAGE (OUTPATIENT)
Dept: FAMILY MEDICINE | Facility: CLINIC | Age: 39
End: 2024-10-14

## 2024-10-14 ENCOUNTER — OFFICE VISIT (OUTPATIENT)
Dept: FAMILY MEDICINE | Facility: CLINIC | Age: 39
End: 2024-10-14
Payer: MEDICAID

## 2024-10-14 VITALS
BODY MASS INDEX: 42.35 KG/M2 | OXYGEN SATURATION: 97 % | HEART RATE: 62 BPM | SYSTOLIC BLOOD PRESSURE: 102 MMHG | HEIGHT: 67 IN | RESPIRATION RATE: 18 BRPM | WEIGHT: 269.81 LBS | TEMPERATURE: 98 F | DIASTOLIC BLOOD PRESSURE: 74 MMHG

## 2024-10-14 DIAGNOSIS — R05.2 SUBACUTE COUGH: ICD-10-CM

## 2024-10-14 DIAGNOSIS — J20.9 ACUTE BRONCHITIS, UNSPECIFIED ORGANISM: Primary | ICD-10-CM

## 2024-10-14 DIAGNOSIS — J34.89 POSTERIOR RHINORRHEA: ICD-10-CM

## 2024-10-14 PROCEDURE — 3044F HG A1C LEVEL LT 7.0%: CPT | Mod: CPTII,,, | Performed by: NURSE PRACTITIONER

## 2024-10-14 PROCEDURE — 3074F SYST BP LT 130 MM HG: CPT | Mod: CPTII,,, | Performed by: NURSE PRACTITIONER

## 2024-10-14 PROCEDURE — 99214 OFFICE O/P EST MOD 30 MIN: CPT | Mod: PBBFAC,PN | Performed by: NURSE PRACTITIONER

## 2024-10-14 PROCEDURE — 3008F BODY MASS INDEX DOCD: CPT | Mod: CPTII,,, | Performed by: NURSE PRACTITIONER

## 2024-10-14 PROCEDURE — 1159F MED LIST DOCD IN RCRD: CPT | Mod: CPTII,,, | Performed by: NURSE PRACTITIONER

## 2024-10-14 PROCEDURE — 2023F DILAT RTA XM W/O RTNOPTHY: CPT | Mod: CPTII,,, | Performed by: NURSE PRACTITIONER

## 2024-10-14 PROCEDURE — 99213 OFFICE O/P EST LOW 20 MIN: CPT | Mod: S$PBB,,, | Performed by: NURSE PRACTITIONER

## 2024-10-14 PROCEDURE — 99999 PR PBB SHADOW E&M-EST. PATIENT-LVL IV: CPT | Mod: PBBFAC,,, | Performed by: NURSE PRACTITIONER

## 2024-10-14 PROCEDURE — 3078F DIAST BP <80 MM HG: CPT | Mod: CPTII,,, | Performed by: NURSE PRACTITIONER

## 2024-10-14 PROCEDURE — 1160F RVW MEDS BY RX/DR IN RCRD: CPT | Mod: CPTII,,, | Performed by: NURSE PRACTITIONER

## 2024-10-14 RX ORDER — FLUTICASONE PROPIONATE 50 MCG
1 SPRAY, SUSPENSION (ML) NASAL DAILY
Qty: 16 G | Refills: 0 | Status: SHIPPED | OUTPATIENT
Start: 2024-10-14

## 2024-10-14 RX ORDER — ALBUTEROL SULFATE 90 UG/1
2 INHALANT RESPIRATORY (INHALATION) EVERY 6 HOURS PRN
Qty: 18 G | Refills: 0 | Status: SHIPPED | OUTPATIENT
Start: 2024-10-14

## 2024-10-14 RX ORDER — PROMETHAZINE HYDROCHLORIDE AND DEXTROMETHORPHAN HYDROBROMIDE 6.25; 15 MG/5ML; MG/5ML
5 SYRUP ORAL NIGHTLY PRN
Qty: 118 ML | Refills: 0 | Status: SHIPPED | OUTPATIENT
Start: 2024-10-14

## 2024-10-14 RX ORDER — BENZONATATE 100 MG/1
CAPSULE ORAL
Qty: 30 CAPSULE | Refills: 0 | Status: SHIPPED | OUTPATIENT
Start: 2024-10-14

## 2024-10-14 RX ORDER — AZITHROMYCIN 250 MG/1
TABLET, FILM COATED ORAL
Qty: 6 TABLET | Refills: 0 | Status: SHIPPED | OUTPATIENT
Start: 2024-10-14 | End: 2024-10-19

## 2024-10-14 NOTE — PROGRESS NOTES
SUBJECTIVE:      Patient ID: Marguerite Colon is a 39 y.o. female.    Chief Complaint: Cough (X 12 days) and Rhinitis    Marguerite here for complaints of cough over the last 12 days.  Patient initially started with scratchy throat, runny nose, sinus congestion, headache and cough.  All other symptoms have resolved except for the cough and clear runny nose.  Patient denies fever, chills, body aches or shortness for breath.  She is having some intermittent wheezing and cough when she is trying to sleep at night.    Cough  This is a recurrent problem. The current episode started 1 to 4 weeks ago. The problem has been gradually worsening. The problem occurs hourly. The cough is Non-productive. Associated symptoms include headaches, nasal congestion, postnasal drip, rhinorrhea and wheezing. Pertinent negatives include no chest pain, chills, ear congestion, ear pain, fever, heartburn, hemoptysis, myalgias, rash, sore throat, shortness of breath, sweats or weight loss. The symptoms are aggravated by exercise and lying down. She has tried OTC cough suppressant for the symptoms. The treatment provided no relief. There is no history of asthma, bronchiectasis, bronchitis, COPD, emphysema, environmental allergies or pneumonia.       Family History   Problem Relation Name Age of Onset    Diabetes Mother      Heart disease Mother      Stroke Mother      Hypertension Mother      Diabetes Father      Ovarian cancer Neg Hx      Uterine cancer Neg Hx      Breast cancer Neg Hx      Colon cancer Neg Hx        Social History     Socioeconomic History    Marital status: Other   Tobacco Use    Smoking status: Every Day     Current packs/day: 0.25     Average packs/day: 0.3 packs/day for 20.0 years (5.0 ttl pk-yrs)     Types: Cigarettes    Smokeless tobacco: Never   Substance and Sexual Activity    Alcohol use: No    Drug use: No    Sexual activity: Not Currently     Partners: Male     Social Drivers of Health     Financial Resource Strain:  Patient Declined (6/3/2024)    Received from [facility]    Overall Financial Resource Strain (CARDIA)     Difficulty of Paying Living Expenses: Patient declined   Food Insecurity: No Food Insecurity (9/4/2024)    Received from [facility]    Hunger Vital Sign     Worried About Running Out of Food in the Last Year: Never true     Ran Out of Food in the Last Year: Never true   Transportation Needs: No Transportation Needs (9/4/2024)    Received from [facility]    PRAPARE - Transportation     Lack of Transportation (Medical): No     Lack of Transportation (Non-Medical): No   Physical Activity: Sufficiently Active (6/3/2024)    Received from [facility]    Exercise Vital Sign     Days of Exercise per Week: 5 days     Minutes of Exercise per Session: 30 min   Stress: Patient Declined (6/3/2024)    Received from [facility]    [employer] - Occupational Stress Questionnaire     Feeling of Stress : Patient declined   Housing Stability: Low Risk  (9/4/2024)    Received from [facility]    Housing Stability Vital Sign     Unable to Pay for Housing in the Last Year: No     Number of Places Lived in the Last Year: 1     Unstable Housing in the Last Year: No     Current Outpatient Medications   Medication Sig Dispense Refill    atorvastatin (LIPITOR) 20 MG tablet Take 1 tablet (20 mg total) by mouth every evening. 90 tablet 1    buPROPion (WELLBUTRIN XL) 150 MG TB24 tablet Take 1 tablet (150 mg total) by mouth once daily. 90 tablet 1    HUMULIN R U-500, CONC, KWIKPEN 500 unit/mL (3 mL) insulin pen TAKE 60 UNITS INTO SKIN BEFORE BREAKFAST, TAKE 75 UNITS BEFORE LUNCH AND TAKE 80 UNITS BEFORE DINNER 12 pen 0    levothyroxine (SYNTHROID) 200 MCG tablet Take 1 tablet (200 mcg total) by mouth before breakfast. 90 tablet 0    levothyroxine (SYNTHROID) 25 MCG tablet Take 25 mcg by mouth every morning.      metFORMIN (GLUCOPHAGE) 500 MG tablet Take 1 tablet (500 mg total) by mouth 2 (two) times daily. 180  "tablet 1    tapinarof (VTAMA) 1 % Crea Use on AA once daily 60 g 1    tirzepatide (MOUNJARO) 5 mg/0.5 mL PnIj Inject 5 mg into the skin every 7 days. 4 Pen 2    albuterol (VENTOLIN HFA) 90 mcg/actuation inhaler Inhale 2 puffs into the lungs every 6 (six) hours as needed for Wheezing or Shortness of Breath. Rescue 18 g 0    azithromycin (Z-MARY) 250 MG tablet Take 2 tablets by mouth on day 1; Take 1 tablet by mouth on days 2-5 6 tablet 0    benzonatate (TESSALON) 100 MG capsule Take 1-2 capsules by mouth at bedtime as needed for cough 30 capsule 0    fluticasone propionate (FLONASE) 50 mcg/actuation nasal spray 1 spray (50 mcg total) by Each Nostril route once daily. 16 g 0    insulin needles, disposable, 30 x 1/3 " Ndle Uses 4 daily, on multiple daily insulin injections 150 each 12     No current facility-administered medications for this visit.     Review of patient's allergies indicates:   Allergen Reactions    Carboplatin      Had to go to ER.     Tuberculin ppd Rash     localized      Past Medical History:   Diagnosis Date    Anxiety     Arthritis     Back pain     Bilateral headaches 04/01/2016    Cirrhosis     Diabetes mellitus     type 2    Hepatomegaly 08/01/2014    Morbidly obese     Obesity, Class III, BMI 40-49.9 (morbid obesity) 04/01/2016    Ovarian cancer 12/01/2012    grade 1 endometrioid adenoca of the ovary    Pancreatitis     Sleep apnea     Small bowel obstruction due to adhesions 12/01/2015    TB (pulmonary tuberculosis)     Thyroid disease     hypothyroid    Thyroid nodule      Past Surgical History:   Procedure Laterality Date    ABDOMINAL ADHESION SURGERY      done at same time with ovarian cystectomy    CHOLECYSTECTOMY  12/2015    HERNIA REPAIR  12/28/2015    25 x 20 cm Venrtralex ST mesh for repair done at time of [facility] for SBO.     HYSTERECTOMY  02/25/2013    [facility] rso. prior lso. ovarian ca    leep      positive margins    left salpingo oophrectomy  07/2010    hemorrhagic cyst    OMENTECTOMY   " "   partial right oophrectomy  12/2012    LMP of ovary    [place] REMOVAL OF OVARY/TUBE(S)      THYROIDECTOMY Bilateral 11/16/2022    Procedure: THYROIDECTOMY;  Surgeon: Hugh Toussaint III, MD;  Location: [facility];  Service: General;  Laterality: Bilateral;    x lap  12/28/2015    post open cholecystectomy 2/21/2015.        Review of Systems   Constitutional:  Negative for appetite change, chills, fatigue, fever, unexpected weight change and weight loss.   HENT:  Positive for postnasal drip and rhinorrhea. Negative for congestion, ear pain, sinus pressure, sinus pain, sore throat, trouble swallowing and voice change.    Eyes:  Negative for discharge.   Respiratory:  Positive for cough and wheezing. Negative for hemoptysis, chest tightness and shortness of breath.    Cardiovascular:  Negative for chest pain.   Gastrointestinal:  Negative for abdominal pain, diarrhea, heartburn, nausea and vomiting.   Musculoskeletal:  Negative for myalgias.   Skin:  Negative for rash.   Allergic/Immunologic: Negative for environmental allergies.   Neurological:  Positive for headaches. Negative for dizziness and weakness.   Hematological:  Negative for adenopathy.      OBJECTIVE:      Vitals:    10/14/24 1454   BP: 102/74   Pulse: 62   Resp: 18   Temp: 98.1 °F (36.7 °C)   TempSrc: Oral   SpO2: 97%   Weight: 122.4 kg (269 lb 12.8 oz)   Height: 5' 7" (1.702 m)     Physical Exam  Vitals and nursing note reviewed. Exam conducted with a chaperone present.   Constitutional:       General: She is not in acute distress.     Appearance: Normal appearance. She is obese. She is not ill-appearing.      Comments: Morbid obesity   HENT:      Head: Normocephalic.      Right Ear: Tympanic membrane, ear canal and external ear normal.      Left Ear: Tympanic membrane, ear canal and external ear normal.      Nose: Rhinorrhea (clear) present. No mucosal edema or congestion.      Right Sinus: No maxillary sinus tenderness or frontal sinus tenderness.      " Left Sinus: No maxillary sinus tenderness or frontal sinus tenderness.      Mouth/Throat:      Mouth: Mucous membranes are moist.      Pharynx: Oropharynx is clear. No oropharyngeal exudate or posterior oropharyngeal erythema.   Eyes:      General: No scleral icterus.        Right eye: No discharge.         Left eye: No discharge.      Conjunctiva/sclera: Conjunctivae normal.      Pupils: Pupils are equal, round, and reactive to light.   Cardiovascular:      Rate and Rhythm: Normal rate and regular rhythm.      Heart sounds: Normal heart sounds.   Pulmonary:      Effort: Pulmonary effort is normal. No respiratory distress.      Breath sounds: Normal breath sounds. No stridor. No wheezing, rhonchi or rales.   Musculoskeletal:      Cervical back: Normal range of motion and neck supple.      Right lower leg: No edema.      Left lower leg: No edema.   Lymphadenopathy:      Cervical: No cervical adenopathy.   Skin:     General: Skin is warm and dry.      Coloration: Skin is not jaundiced or pale.   Neurological:      Mental Status: She is alert and oriented to person, place, and time.   Psychiatric:         Mood and Affect: Mood normal.         Behavior: Behavior normal.         Thought Content: Thought content normal.         Judgment: Judgment normal.        Assessment:       1. Acute bronchitis, unspecified organism    2. Posterior rhinorrhea    3. Subacute cough        Plan:       Acute bronchitis, unspecified organism  -     azithromycin (Z-MARY) 250 MG tablet; Take 2 tablets by mouth on day 1; Take 1 tablet by mouth on days 2-5  Dispense: 6 tablet; Refill: 0  -     albuterol (VENTOLIN HFA) 90 mcg/actuation inhaler; Inhale 2 puffs into the lungs every 6 (six) hours as needed for Wheezing or Shortness of Breath. Rescue  Dispense: 18 g; Refill: 0  -     benzonatate (TESSALON) 100 MG capsule; Take 1-2 capsules by mouth at bedtime as needed for cough  Dispense: 30 capsule; Refill: 0  -unresolved symptoms times 12 days,  encouraged Mucinex over-the-counter with plenty of water, albuterol inhaler as needed for wheezing, Z-Filiberto as instructed until completed, tessalon perles at bedtime prn     Posterior rhinorrhea  -     fluticasone propionate (FLONASE) 50 mcg/actuation nasal spray; 1 spray (50 mcg total) by Each Nostril route once daily.  Dispense: 16 g; Refill: 0    Subacute cough  -     benzonatate (TESSALON) 100 MG capsule; Take 1-2 capsules by mouth at bedtime as needed for cough  Dispense: 30 capsule; Refill: 0        Follow up if symptoms worsen or fail to improve.      10/14/2024 ELIZABETH Santos, FNP    This note was created using [employer] voice recognition software that occasionally misinterprets phrases or words.

## 2024-10-18 DIAGNOSIS — E11.65 TYPE 2 DIABETES MELLITUS WITH HYPERGLYCEMIA, WITH LONG-TERM CURRENT USE OF INSULIN: ICD-10-CM

## 2024-10-18 DIAGNOSIS — Z79.4 TYPE 2 DIABETES MELLITUS WITH HYPERGLYCEMIA, WITH LONG-TERM CURRENT USE OF INSULIN: ICD-10-CM

## 2024-10-21 RX ORDER — TIRZEPATIDE 5 MG/.5ML
5 INJECTION, SOLUTION SUBCUTANEOUS
Qty: 4 PEN | Refills: 2 | Status: CANCELLED | OUTPATIENT
Start: 2024-10-21

## 2024-12-05 DIAGNOSIS — J34.89 POSTERIOR RHINORRHEA: ICD-10-CM

## 2024-12-05 RX ORDER — FLUTICASONE PROPIONATE 50 MCG
SPRAY, SUSPENSION (ML) NASAL
Qty: 16 ML | Refills: 5 | Status: SHIPPED | OUTPATIENT
Start: 2024-12-05

## 2025-01-14 ENCOUNTER — PATIENT MESSAGE (OUTPATIENT)
Dept: ADMINISTRATIVE | Facility: HOSPITAL | Age: 40
End: 2025-01-14
Payer: MEDICAID

## 2025-01-15 DIAGNOSIS — E11.9 TYPE 2 DIABETES MELLITUS WITHOUT COMPLICATION: ICD-10-CM

## 2025-02-15 ENCOUNTER — PATIENT MESSAGE (OUTPATIENT)
Dept: FAMILY MEDICINE | Facility: CLINIC | Age: 40
End: 2025-02-15
Payer: MEDICAID

## 2025-04-07 ENCOUNTER — HOSPITAL ENCOUNTER (OUTPATIENT)
Dept: RADIOLOGY | Facility: HOSPITAL | Age: 40
Discharge: HOME | End: 2025-04-07
Attending: NURSE PRACTITIONER
Payer: MEDICAID

## 2025-04-07 DIAGNOSIS — Z12.31 SCREENING MAMMOGRAM FOR BREAST CANCER: ICD-10-CM

## 2025-04-07 PROCEDURE — 77067 SCR MAMMO BI INCL CAD: CPT | Mod: TC,PO

## 2025-04-07 PROCEDURE — 77067 SCR MAMMO BI INCL CAD: CPT | Mod: 26,,, | Performed by: RADIOLOGY

## 2025-04-07 PROCEDURE — 77063 BREAST TOMOSYNTHESIS BI: CPT | Mod: 26,,, | Performed by: RADIOLOGY

## 2025-04-08 ENCOUNTER — RESULTS FOLLOW-UP (OUTPATIENT)
Dept: FAMILY MEDICINE | Facility: CLINIC | Age: 40
End: 2025-04-08

## 2025-04-09 ENCOUNTER — HOSPITAL ENCOUNTER (OUTPATIENT)
Dept: RADIOLOGY | Facility: HOSPITAL | Age: 40
Discharge: HOME | End: 2025-04-09
Attending: NURSE PRACTITIONER
Payer: MEDICAID

## 2025-04-09 ENCOUNTER — OFFICE VISIT (OUTPATIENT)
Dept: FAMILY MEDICINE | Facility: CLINIC | Age: 40
End: 2025-04-09
Payer: MEDICAID

## 2025-04-09 ENCOUNTER — RESULTS FOLLOW-UP (OUTPATIENT)
Dept: FAMILY MEDICINE | Facility: CLINIC | Age: 40
End: 2025-04-09

## 2025-04-09 VITALS
OXYGEN SATURATION: 95 % | SYSTOLIC BLOOD PRESSURE: 116 MMHG | WEIGHT: 242.06 LBS | HEIGHT: 67 IN | DIASTOLIC BLOOD PRESSURE: 62 MMHG | RESPIRATION RATE: 16 BRPM | TEMPERATURE: 98 F | BODY MASS INDEX: 37.99 KG/M2 | HEART RATE: 87 BPM

## 2025-04-09 DIAGNOSIS — F32.9 REACTIVE DEPRESSION: ICD-10-CM

## 2025-04-09 DIAGNOSIS — G47.33 OBSTRUCTIVE SLEEP APNEA SYNDROME IN ADULT: ICD-10-CM

## 2025-04-09 DIAGNOSIS — E66.01 CLASS 2 SEVERE OBESITY DUE TO EXCESS CALORIES WITH SERIOUS COMORBIDITY AND BODY MASS INDEX (BMI) OF 37.0 TO 37.9 IN ADULT: ICD-10-CM

## 2025-04-09 DIAGNOSIS — E11.9 TYPE 2 DIABETES MELLITUS WITHOUT COMPLICATION, WITHOUT LONG-TERM CURRENT USE OF INSULIN: ICD-10-CM

## 2025-04-09 DIAGNOSIS — E66.812 CLASS 2 SEVERE OBESITY DUE TO EXCESS CALORIES WITH SERIOUS COMORBIDITY AND BODY MASS INDEX (BMI) OF 37.0 TO 37.9 IN ADULT: ICD-10-CM

## 2025-04-09 DIAGNOSIS — R61 NIGHT SWEATS: ICD-10-CM

## 2025-04-09 DIAGNOSIS — Z00.00 ENCOUNTER FOR ROUTINE ADULT HEALTH EXAMINATION WITHOUT ABNORMAL FINDINGS: Primary | ICD-10-CM

## 2025-04-09 PROBLEM — R26.9 GAIT ABNORMALITY: Status: RESOLVED | Noted: 2019-10-31 | Resolved: 2025-04-09

## 2025-04-09 PROCEDURE — 71046 X-RAY EXAM CHEST 2 VIEWS: CPT | Mod: 26,,, | Performed by: RADIOLOGY

## 2025-04-09 PROCEDURE — 99214 OFFICE O/P EST MOD 30 MIN: CPT | Mod: PBBFAC,PN | Performed by: NURSE PRACTITIONER

## 2025-04-09 PROCEDURE — 71046 X-RAY EXAM CHEST 2 VIEWS: CPT | Mod: TC,PO

## 2025-04-09 PROCEDURE — 99999 PR PBB SHADOW E&M-EST. PATIENT-LVL IV: CPT | Mod: PBBFAC,,, | Performed by: NURSE PRACTITIONER

## 2025-04-09 RX ORDER — TRAZODONE HYDROCHLORIDE 50 MG/1
50 TABLET ORAL NIGHTLY PRN
Qty: 30 TABLET | Refills: 5 | Status: SHIPPED | OUTPATIENT
Start: 2025-04-09

## 2025-04-09 RX ORDER — BUPROPION HYDROCHLORIDE 150 MG/1
150 TABLET ORAL DAILY
Qty: 90 TABLET | Refills: 1 | Status: SHIPPED | OUTPATIENT
Start: 2025-04-09

## 2025-04-09 NOTE — PROGRESS NOTES
SUBJECTIVE:   HPI: Marguerite Colon  is a 40 y.o. female who presents for annual physical .   Annual Exam    History of Present Illness    CHIEF COMPLAINT:  Marguerite presents today with concerns about sleep disturbances and hot flashes.    SLEEP CONCERNS:  She reports difficulty maintaining sleep, specifically waking at 2:30 AM and being unable to fall back asleep. She can fall asleep initially without difficulty and denies anxiety or racing thoughts affecting sleep. She uses CPAP regularly but experiences nasal burning when the machine runs out of water.    HOT FLASHES:  She reports daytime hot flashes while at rest, noting onset coincided with medication change from Zoloft to Wellbutrin. She denies experiencing hot flashes during sleep, wakes up hot, pushed covers off and then is cold a short time later.     RECENT BARIATRIC SURGERY:  She underwent sleeve gastrectomy 6 weeks ago, as extensive scar tissue and adhesions prevented bypass surgery. Her weight has decreased from 253 lbs pre-surgery to current 240-242 lbs.    GI CONCERNS:  She reports constipation since dietary changes, with symptoms worsening post-surgery. She finds Miralax helpful in managing the condition.    DIABETES:   Off all meds; last A1C 7.0; has appt with endo next month     MEDICATIONS:  She currently takes Wellbutrin and thyroid medication at 225 dose.    Preventive care:  Recent mammogram normal, colon cancer screening due at 43    PAST MEDICAL HISTORY:  History includes hysterectomy at age 28 d/t ovarian cancer, positive TB test in 2020 treated with several weeks of medication, and psoriasis.        (Not in a hospital admission)    Review of patient's allergies indicates:   Allergen Reactions    Carboplatin      Had to go to ER.     Tuberculin ppd Rash     localized     Medications Ordered Prior to Encounter[1]  Past Medical History:   Diagnosis Date    Anxiety     Arthritis     Back pain     Bilateral headaches 04/01/2016    Cirrhosis      Diabetes mellitus     type 2    Hepatomegaly 08/01/2014    Morbidly obese     Obesity, Class III, BMI 40-49.9 (morbid obesity) 04/01/2016    Ovarian cancer 12/01/2012    grade 1 endometrioid adenoca of the ovary    Pancreatitis     Sleep apnea     Small bowel obstruction due to adhesions 12/01/2015    TB (pulmonary tuberculosis)     Thyroid disease     hypothyroid    Thyroid nodule      Past Surgical History:   Procedure Laterality Date    ABDOMINAL ADHESION SURGERY      done at same time with ovarian cystectomy    CHOLECYSTECTOMY  12/2015    HERNIA REPAIR  12/28/2015    25 x 20 cm Venrtralex ST mesh for repair done at time of [facility] for SBO.     HYSTERECTOMY  02/25/2013    adeline rso. prior lso. ovarian ca    leep      positive margins    left salpingo oophrectomy  07/2010    hemorrhagic cyst    OMENTECTOMY      partial right oophrectomy  12/2012    LMP of ovary    CA REMOVAL OF OVARY/TUBE(S)      THYROIDECTOMY Bilateral 11/16/2022    Procedure: THYROIDECTOMY;  Surgeon: Hugh Toussaint III, MD;  Location: [facility];  Service: General;  Laterality: Bilateral;    x lap  12/28/2015    post open cholecystectomy 2/21/2015.      Family History   Problem Relation Name Age of Onset    Diabetes Mother      Heart disease Mother      Stroke Mother      Hypertension Mother      Diabetes Father      Ovarian cancer Neg Hx      Uterine cancer Neg Hx      Breast cancer Neg Hx      Colon cancer Neg Hx       Social History[2]   Health Maintenance Topics with due status: Not Due       Topic Last Completion Date    Lipid Panel 09/28/2024    Hemoglobin A1c 02/08/2025    Mammogram 04/07/2025    RSV Vaccine (Age 60+ and Pregnant patients) Not Due     Immunization History   Administered Date(s) Administered    COVID-19, MRNA, LN-S, PF (MODERNA FULL 0.5 ML DOSE) 09/23/2021, 10/21/2021    DTaP 1983, 1983, 04/16/1984, 05/04/1988    IPV 1983, 1983, 04/06/1984, 05/04/1988    MMR 04/06/1984       Review of Systems  "  Constitutional:  Positive for diaphoresis. Negative for activity change, appetite change, chills, fatigue, fever and unexpected weight change.   HENT:  Negative for congestion, hearing loss, rhinorrhea, sore throat and trouble swallowing.    Eyes:  Negative for discharge and visual disturbance.   Respiratory:  Negative for cough, chest tightness, shortness of breath and wheezing.         Denies hemoptysis   Cardiovascular:  Negative for chest pain, palpitations and leg swelling.   Gastrointestinal:  Negative for abdominal pain, blood in stool, constipation, diarrhea, nausea and vomiting.   Endocrine: Positive for heat intolerance. Negative for cold intolerance, polydipsia, polyphagia and polyuria.   Genitourinary:  Negative for difficulty urinating, dysuria, hematuria, menstrual problem, pelvic pain and vaginal discharge.   Musculoskeletal:  Negative for arthralgias and joint swelling.   Skin:  Negative for pallor.   Neurological:  Negative for dizziness, tremors, seizures, speech difficulty, weakness and headaches.   Hematological:  Negative for adenopathy. Does not bruise/bleed easily.   Psychiatric/Behavioral:  Positive for sleep disturbance. Negative for confusion, dysphoric mood (controlled) and suicidal ideas. The patient is not nervous/anxious.       OBJECTIVE:      Vitals:    04/09/25 1447   BP: 116/62   BP Location: Right arm   Patient Position: Sitting   Pulse: 87   Resp: 16   Temp: 98.2 °F (36.8 °C)   TempSrc: Oral   SpO2: 95%   Weight: 109.8 kg (242 lb 1 oz)   Height: 5' 7" (1.702 m)     Physical Exam  Vitals and nursing note reviewed. Exam conducted with a chaperone present.   Constitutional:       General: She is not in acute distress.     Appearance: Normal appearance. She is obese. She is not ill-appearing.      Comments: Morbid obesity   HENT:      Head: Normocephalic.      Right Ear: Tympanic membrane, ear canal and external ear normal.      Left Ear: Tympanic membrane, ear canal and external ear " normal.      Nose: Nose normal.      Mouth/Throat:      Mouth: Mucous membranes are moist.      Pharynx: Oropharynx is clear.   Eyes:      General: No scleral icterus.     Extraocular Movements: Extraocular movements intact.      Conjunctiva/sclera: Conjunctivae normal.      Pupils: Pupils are equal, round, and reactive to light.   Neck:      Comments: Thyroidectomy   Cardiovascular:      Rate and Rhythm: Normal rate and regular rhythm.      Heart sounds: Normal heart sounds. No murmur heard.  Pulmonary:      Effort: Pulmonary effort is normal. No respiratory distress.      Breath sounds: Normal breath sounds. No wheezing or rales.   Abdominal:      General: Bowel sounds are normal.      Palpations: Abdomen is soft.      Tenderness: There is no abdominal tenderness.   Musculoskeletal:      Cervical back: Normal range of motion and neck supple.      Right lower leg: No edema.      Left lower leg: No edema.   Lymphadenopathy:      Head:      Right side of head: No submental, submandibular or tonsillar adenopathy.      Left side of head: No submental, submandibular or tonsillar adenopathy.      Cervical: No cervical adenopathy.      Right cervical: No superficial or deep cervical adenopathy.     Left cervical: No superficial or deep cervical adenopathy.      Upper Body:      Right upper body: No supraclavicular adenopathy.      Left upper body: No supraclavicular adenopathy.   Skin:     General: Skin is warm and dry.      Coloration: Skin is not jaundiced or pale.   Neurological:      Mental Status: She is alert and oriented to person, place, and time.   Psychiatric:         Mood and Affect: Mood normal.         Behavior: Behavior normal.         Thought Content: Thought content normal.         Judgment: Judgment normal.        Assessment:       1. Encounter for routine adult health examination without abnormal findings    2. Night sweats    3. Type 2 diabetes mellitus without complication, without long-term current  use of insulin    4. Obstructive sleep apnea syndrome in adult    5. Reactive depression    6. Class 2 severe obesity due to excess calories with serious comorbidity and body mass index (BMI) of 37.0 to 37.9 in adult        Plan:       Encounter for routine adult health examination without abnormal findings   -endocrinology ordering thyroid and diabetes labs, will review after available    Night sweats  -     X-Ray Chest PA And Lateral; Future; Expected date: 04/09/2025  -     CBC Auto Differential; Future; Expected date: 04/09/2025  -unlikely due to meds as this has been going on for 1 year; check with endocrinology, maybe hormonal?  Will check CBC and x-ray due to history of TB and status post treatment    Type 2 diabetes mellitus without complication, without long-term current use of insulin   -patient off treatment due to gastric surgery, A1c at 5 2 months ago, repeat labs planned next month with endocrinology; encouraged eye exam and foot exam    Obstructive sleep apnea syndrome in adult   -continue use nightly    Reactive depression  -     traZODone (DESYREL) 50 MG tablet; Take 1 tablet (50 mg total) by mouth nightly as needed for Insomnia.  Dispense: 30 tablet; Refill: 5  -     buPROPion (WELLBUTRIN XL) 150 MG TB24 tablet; Take 1 tablet (150 mg total) by mouth once daily.  Dispense: 90 tablet; Refill: 1  -depression stable on Wellbutrin, add trazodone nightly p.r.n. for insomnia; do not drive or combine with other sedatives and patient voiced understanding, possible side effects discussed    Class 2 severe obesity due to excess calories with serious comorbidity and body mass index (BMI) of 37.0 to 37.9 in adult        Counseled on age and gender appropriate medical preventative services, including cancer screenings, immunizations, overall nutritional health, need for a consistent exercise regimen and an overall push towards maintaining a vigorous and active lifestyle.      Follow up in about 6 months (around  "10/9/2025) for f/u  insomnia, DM .      This note was generated with the assistance of ambient listening technology. Verbal consent was obtained by the patient and accompanying visitor(s) for the recording of patient appointment to facilitate this note. I attest to having reviewed and edited the generated note for accuracy, though some syntax or spelling errors may persist. Please contact the author of this note for any clarification.     This note was created using [employer] voice recognition software that occasionally misinterprets phrases or words.                [1]   Current Outpatient Medications on File Prior to Visit   Medication Sig Dispense Refill    albuterol (VENTOLIN HFA) 90 mcg/actuation inhaler Inhale 2 puffs into the lungs every 6 (six) hours as needed for Wheezing or Shortness of Breath. Rescue 18 g 0    atorvastatin (LIPITOR) 20 MG tablet Take 1 tablet (20 mg total) by mouth every evening. 90 tablet 1    levothyroxine (SYNTHROID) 200 MCG tablet Take 1 tablet (200 mcg total) by mouth before breakfast. 90 tablet 0    levothyroxine (SYNTHROID) 25 MCG tablet Take 25 mcg by mouth every morning.      tapinarof (VTAMA) 1 % Crea Use on AA once daily 60 g 1    [DISCONTINUED] buPROPion (WELLBUTRIN XL) 150 MG TB24 tablet Take 1 tablet (150 mg total) by mouth once daily. 90 tablet 1    insulin needles, disposable, 30 x 1/3 " Ndle Uses 4 daily, on multiple daily insulin injections 150 each 12    [DISCONTINUED] benzonatate (TESSALON) 100 MG capsule Take 1-2 capsules by mouth at bedtime as needed for cough (Patient not taking: Reported on 4/9/2025) 30 capsule 0    [DISCONTINUED] fluticasone propionate (FLONASE) 50 mcg/actuation nasal spray USE 1 SPRAY (50 MCG TOTAL) IN EACH NOSTRIL ONCE DAILY (Patient not taking: Reported on 4/9/2025) 16 mL 5    [DISCONTINUED] HUMULIN R U-500, CONC, KWIKPEN 500 unit/mL (3 mL) insulin pen TAKE 60 UNITS INTO SKIN BEFORE BREAKFAST, TAKE 75 UNITS BEFORE LUNCH AND TAKE 80 UNITS BEFORE " DINNER (Patient not taking: Reported on 4/9/2025) 12 pen 0    [DISCONTINUED] metFORMIN (GLUCOPHAGE) 500 MG tablet Take 1 tablet (500 mg total) by mouth 2 (two) times daily. (Patient not taking: Reported on 4/9/2025) 180 tablet 1    [DISCONTINUED] promethazine-dextromethorphan (PROMETHAZINE-DM) 6.25-15 mg/5 mL Syrp Take 5 mLs by mouth nightly as needed (cough). (Patient not taking: Reported on 4/9/2025) 118 mL 0    [DISCONTINUED] tirzepatide (MOUNJARO) 5 mg/0.5 mL PnIj Inject 5 mg into the skin every 7 days. (Patient not taking: Reported on 4/9/2025) 4 Pen 2     No current facility-administered medications on file prior to visit.   [2]   Social History  Tobacco Use    Smoking status: Every Day     Current packs/day: 0.25     Average packs/day: 0.3 packs/day for 20.0 years (5.0 ttl pk-yrs)     Types: Cigarettes    Smokeless tobacco: Never   Substance Use Topics    Alcohol use: No    Drug use: No

## 2025-05-23 ENCOUNTER — PATIENT MESSAGE (OUTPATIENT)
Dept: ADMINISTRATIVE | Facility: HOSPITAL | Age: 40
End: 2025-05-23
Payer: MEDICAID

## (undated) DEVICE — SUTURE SILK 3-0 SH 18 C013D

## (undated) DEVICE — DRAIN ROUND 100CC 1/8 0073310

## (undated) DEVICE — DRAPE MAGNETIC 16X20 Q420

## (undated) DEVICE — DERMABOND HVD MINI  DHVM12

## (undated) DEVICE — HEMOSTAT SURGICEL 4X8

## (undated) DEVICE — SPONGE PEANUT 3/8 7103

## (undated) DEVICE — SYSTEM RETENTION PANNUS WITH ADHESIVE PAD MEDICAL GRADE HOOK

## (undated) DEVICE — NEEDLE BOVIE INSULATED COATED   E1465

## (undated) DEVICE — SUTURE VICRYL 4-0 18 VCP773D

## (undated) DEVICE — GLOVE BIOGEL MICRO SURGEON PINK SZ 7.5

## (undated) DEVICE — DRAIN PENROSE STERILE 12X1/4

## (undated) DEVICE — POUCH INSTRUMENT 1018

## (undated) DEVICE — SUTURE VICRYL 4-0 SH 27 J415H

## (undated) DEVICE — SUTURE SILK 2-0 18 A185H

## (undated) DEVICE — UNDERGLOVE BIOGEL PI MICRO BLUE SZ 8

## (undated) DEVICE — SUTURE SILK 3-0 18 A184H

## (undated) DEVICE — Device

## (undated) DEVICE — SPONGE LAP 18X18

## (undated) DEVICE — TRAY GENERAL SURGERY

## (undated) DEVICE — CLIP APPLIER SM MCS20

## (undated) DEVICE — SUTURE SILK 4-0 18 A183H

## (undated) DEVICE — BLADE SCALPEL #15 371115

## (undated) DEVICE — CLIP APPLIER MEDIUM MSM20

## (undated) DEVICE — DISSECTOR EXACT LIGASURE 20.6MM-21CM

## (undated) DEVICE — PAD BOVIE ADULT

## (undated) DEVICE — SUTURE ETHILON 4-0 PS-2 18 1667H

## (undated) DEVICE — SUTURE MONOCRYL 4-0 PS-2 27 MCP426H

## (undated) DEVICE — SUTURE MONOCRYL 4-0 27 SH MCP415H

## (undated) DEVICE — DRAPE THYROID 89255